# Patient Record
Sex: FEMALE | Race: WHITE | Employment: OTHER | URBAN - METROPOLITAN AREA
[De-identification: names, ages, dates, MRNs, and addresses within clinical notes are randomized per-mention and may not be internally consistent; named-entity substitution may affect disease eponyms.]

---

## 2017-05-08 ENCOUNTER — HOSPITAL ENCOUNTER (INPATIENT)
Age: 72
LOS: 1 days | Discharge: HOME | DRG: 311 | End: 2017-05-09
Attending: INTERNAL MEDICINE | Admitting: INTERNAL MEDICINE
Payer: COMMERCIAL

## 2017-05-08 PROBLEM — K21.9 ACID REFLUX: Status: ACTIVE | Noted: 2017-05-08

## 2017-05-08 PROBLEM — I20.9 ANGINA, CLASS IV (HCC): Status: ACTIVE | Noted: 2017-05-08

## 2017-05-08 PROBLEM — E78.2 MIXED DYSLIPIDEMIA: Status: ACTIVE | Noted: 2017-05-08

## 2017-05-08 PROBLEM — Z86.79: Status: ACTIVE | Noted: 2017-05-08

## 2017-05-08 LAB
PRO-BNP: 7724 PG/ML
TROPONIN: 0.05 NG/ML (ref 0–0.01)
TROPONIN: 0.06 NG/ML (ref 0–0.01)

## 2017-05-08 PROCEDURE — 6370000000 HC RX 637 (ALT 250 FOR IP): Performed by: PHYSICIAN ASSISTANT

## 2017-05-08 PROCEDURE — 83880 ASSAY OF NATRIURETIC PEPTIDE: CPT

## 2017-05-08 PROCEDURE — 2580000003 HC RX 258: Performed by: PHYSICIAN ASSISTANT

## 2017-05-08 PROCEDURE — 2060000000 HC ICU INTERMEDIATE R&B

## 2017-05-08 PROCEDURE — 36415 COLL VENOUS BLD VENIPUNCTURE: CPT

## 2017-05-08 PROCEDURE — 6360000002 HC RX W HCPCS: Performed by: INTERNAL MEDICINE

## 2017-05-08 PROCEDURE — 93005 ELECTROCARDIOGRAM TRACING: CPT

## 2017-05-08 PROCEDURE — 84484 ASSAY OF TROPONIN QUANT: CPT

## 2017-05-08 RX ORDER — TRAMADOL HYDROCHLORIDE 50 MG/1
50 TABLET ORAL EVERY 6 HOURS PRN
Status: DISCONTINUED | OUTPATIENT
Start: 2017-05-08 | End: 2017-05-09 | Stop reason: HOSPADM

## 2017-05-08 RX ORDER — SIMVASTATIN 20 MG
20 TABLET ORAL NIGHTLY
Status: DISCONTINUED | OUTPATIENT
Start: 2017-05-08 | End: 2017-05-09 | Stop reason: HOSPADM

## 2017-05-08 RX ORDER — SEVELAMER CARBONATE 800 MG/1
800 TABLET, FILM COATED ORAL
Status: DISCONTINUED | OUTPATIENT
Start: 2017-05-08 | End: 2017-05-09 | Stop reason: HOSPADM

## 2017-05-08 RX ORDER — TRAMADOL HYDROCHLORIDE 50 MG/1
100 TABLET ORAL EVERY 6 HOURS PRN
Status: DISCONTINUED | OUTPATIENT
Start: 2017-05-08 | End: 2017-05-09 | Stop reason: HOSPADM

## 2017-05-08 RX ORDER — CHOLECALCIFEROL (VITAMIN D3) 10 MCG
1 TABLET ORAL DAILY
Status: DISCONTINUED | OUTPATIENT
Start: 2017-05-08 | End: 2017-05-09 | Stop reason: HOSPADM

## 2017-05-08 RX ORDER — LIDOCAINE AND PRILOCAINE 25; 25 MG/G; MG/G
CREAM TOPICAL
Status: ON HOLD | COMMUNITY
End: 2020-01-01

## 2017-05-08 RX ORDER — ONDANSETRON 2 MG/ML
4 INJECTION INTRAMUSCULAR; INTRAVENOUS EVERY 6 HOURS PRN
Status: DISCONTINUED | OUTPATIENT
Start: 2017-05-08 | End: 2017-05-09 | Stop reason: HOSPADM

## 2017-05-08 RX ORDER — ONDANSETRON 2 MG/ML
4 INJECTION INTRAMUSCULAR; INTRAVENOUS EVERY 6 HOURS PRN
Status: DISCONTINUED | OUTPATIENT
Start: 2017-05-08 | End: 2017-05-08 | Stop reason: SDUPTHER

## 2017-05-08 RX ORDER — RANOLAZINE 500 MG/1
500 TABLET, EXTENDED RELEASE ORAL 2 TIMES DAILY
Status: DISCONTINUED | OUTPATIENT
Start: 2017-05-08 | End: 2017-05-09

## 2017-05-08 RX ORDER — HYDRALAZINE HYDROCHLORIDE 100 MG/1
100 TABLET, FILM COATED ORAL 2 TIMES DAILY
Status: ON HOLD | COMMUNITY
End: 2020-01-01

## 2017-05-08 RX ORDER — SODIUM CHLORIDE 0.9 % (FLUSH) 0.9 %
10 SYRINGE (ML) INJECTION EVERY 12 HOURS SCHEDULED
Status: DISCONTINUED | OUTPATIENT
Start: 2017-05-08 | End: 2017-05-09 | Stop reason: HOSPADM

## 2017-05-08 RX ORDER — NITROGLYCERIN 0.4 MG/1
0.4 TABLET SUBLINGUAL EVERY 5 MIN PRN
Status: DISCONTINUED | OUTPATIENT
Start: 2017-05-08 | End: 2017-05-09 | Stop reason: HOSPADM

## 2017-05-08 RX ORDER — METOPROLOL SUCCINATE 50 MG/1
50 TABLET, EXTENDED RELEASE ORAL 2 TIMES DAILY
Status: ON HOLD | COMMUNITY
End: 2020-01-01

## 2017-05-08 RX ORDER — MORPHINE SULFATE 4 MG/ML
4 INJECTION, SOLUTION INTRAMUSCULAR; INTRAVENOUS
Status: DISCONTINUED | OUTPATIENT
Start: 2017-05-08 | End: 2017-05-09 | Stop reason: HOSPADM

## 2017-05-08 RX ORDER — METOPROLOL TARTRATE 50 MG/1
50 TABLET, FILM COATED ORAL 2 TIMES DAILY
Status: DISCONTINUED | OUTPATIENT
Start: 2017-05-08 | End: 2017-05-09

## 2017-05-08 RX ORDER — ASPIRIN 81 MG/1
81 TABLET, CHEWABLE ORAL DAILY
Status: DISCONTINUED | OUTPATIENT
Start: 2017-05-09 | End: 2017-05-09 | Stop reason: HOSPADM

## 2017-05-08 RX ORDER — CINACALCET 30 MG/1
30 TABLET, FILM COATED ORAL DAILY
Status: ON HOLD | COMMUNITY
End: 2020-01-01

## 2017-05-08 RX ORDER — SEVELAMER CARBONATE 800 MG/1
1 TABLET, FILM COATED ORAL
Status: ON HOLD | COMMUNITY
End: 2020-01-01

## 2017-05-08 RX ORDER — ACETAMINOPHEN 325 MG/1
650 TABLET ORAL EVERY 4 HOURS PRN
Status: DISCONTINUED | OUTPATIENT
Start: 2017-05-08 | End: 2017-05-09 | Stop reason: HOSPADM

## 2017-05-08 RX ORDER — SODIUM CHLORIDE 0.9 % (FLUSH) 0.9 %
10 SYRINGE (ML) INJECTION PRN
Status: DISCONTINUED | OUTPATIENT
Start: 2017-05-08 | End: 2017-05-09 | Stop reason: HOSPADM

## 2017-05-08 RX ORDER — AMLODIPINE BESYLATE 5 MG/1
5 TABLET ORAL DAILY
Status: ON HOLD | COMMUNITY
End: 2020-01-01

## 2017-05-08 RX ORDER — MORPHINE SULFATE 2 MG/ML
2 INJECTION, SOLUTION INTRAMUSCULAR; INTRAVENOUS
Status: DISCONTINUED | OUTPATIENT
Start: 2017-05-08 | End: 2017-05-09 | Stop reason: HOSPADM

## 2017-05-08 RX ADMIN — ONDANSETRON HYDROCHLORIDE 4 MG: 2 INJECTION, SOLUTION INTRAMUSCULAR; INTRAVENOUS at 23:55

## 2017-05-08 RX ADMIN — TRAMADOL HYDROCHLORIDE 50 MG: 50 TABLET, FILM COATED ORAL at 23:56

## 2017-05-08 RX ADMIN — RANOLAZINE 500 MG: 500 TABLET, FILM COATED, EXTENDED RELEASE ORAL at 21:19

## 2017-05-08 RX ADMIN — SIMVASTATIN 20 MG: 20 TABLET, FILM COATED ORAL at 21:19

## 2017-05-08 RX ADMIN — SODIUM CHLORIDE, PRESERVATIVE FREE 10 ML: 5 INJECTION INTRAVENOUS at 21:20

## 2017-05-08 ASSESSMENT — ENCOUNTER SYMPTOMS
WHEEZING: 0
ABDOMINAL PAIN: 0
VOMITING: 0
APNEA: 0
NAUSEA: 0
COLOR CHANGE: 0
SHORTNESS OF BREATH: 0
COUGH: 0
CHEST TIGHTNESS: 1

## 2017-05-08 ASSESSMENT — PAIN SCALES - GENERAL: PAINLEVEL_OUTOF10: 5

## 2017-05-09 VITALS
OXYGEN SATURATION: 94 % | RESPIRATION RATE: 16 BRPM | HEART RATE: 67 BPM | SYSTOLIC BLOOD PRESSURE: 142 MMHG | TEMPERATURE: 98.2 F | BODY MASS INDEX: 25.16 KG/M2 | WEIGHT: 151.01 LBS | HEIGHT: 65 IN | DIASTOLIC BLOOD PRESSURE: 66 MMHG

## 2017-05-09 LAB
ANION GAP SERPL CALCULATED.3IONS-SCNC: 11 MEQ/L (ref 7–13)
BUN BLDV-MCNC: 39 MG/DL (ref 8–23)
CALCIUM SERPL-MCNC: 8.9 MG/DL (ref 8.6–10.2)
CHLORIDE BLD-SCNC: 95 MEQ/L (ref 98–107)
CHOLESTEROL, TOTAL: 192 MG/DL (ref 0–199)
CO2: 33 MEQ/L (ref 22–29)
CREAT SERPL-MCNC: 6.45 MG/DL (ref 0.5–0.9)
GFR AFRICAN AMERICAN: 7.7
GFR NON-AFRICAN AMERICAN: 6.4
GLUCOSE BLD-MCNC: 74 MG/DL (ref 74–109)
HCT VFR BLD CALC: 30.2 % (ref 37–47)
HDLC SERPL-MCNC: 67 MG/DL (ref 40–59)
HEMOGLOBIN: 10.3 G/DL (ref 12–16)
INR BLD: 1
LDL CHOLESTEROL CALCULATED: 100 MG/DL (ref 0–129)
LV EF: 60 %
LVEF MODALITY: NORMAL
MAGNESIUM: 2.4 MG/DL (ref 1.7–2.3)
MCH RBC QN AUTO: 33.5 PG (ref 27–31.3)
MCHC RBC AUTO-ENTMCNC: 34.2 % (ref 33–37)
MCV RBC AUTO: 97.9 FL (ref 82–100)
PDW BLD-RTO: 15.1 % (ref 11.5–14.5)
PHOSPHORUS: 4.6 MG/DL (ref 2.5–4.5)
PLATELET # BLD: 155 K/UL (ref 130–400)
POTASSIUM SERPL-SCNC: 5.4 MEQ/L (ref 3.5–5.1)
PROTHROMBIN TIME: 10.7 SEC (ref 8.1–13.7)
RBC # BLD: 3.08 M/UL (ref 4.2–5.4)
SODIUM BLD-SCNC: 139 MEQ/L (ref 132–144)
TRIGL SERPL-MCNC: 124 MG/DL (ref 0–200)
WBC # BLD: 5.9 K/UL (ref 4.8–10.8)

## 2017-05-09 PROCEDURE — 2580000003 HC RX 258: Performed by: PHYSICIAN ASSISTANT

## 2017-05-09 PROCEDURE — 99217 PR OBSERVATION CARE DISCHARGE MANAGEMENT: CPT | Performed by: NURSE PRACTITIONER

## 2017-05-09 PROCEDURE — 6370000000 HC RX 637 (ALT 250 FOR IP): Performed by: NURSE PRACTITIONER

## 2017-05-09 PROCEDURE — 6370000000 HC RX 637 (ALT 250 FOR IP): Performed by: PHYSICIAN ASSISTANT

## 2017-05-09 PROCEDURE — 93005 ELECTROCARDIOGRAM TRACING: CPT

## 2017-05-09 PROCEDURE — 80061 LIPID PANEL: CPT

## 2017-05-09 PROCEDURE — 36415 COLL VENOUS BLD VENIPUNCTURE: CPT

## 2017-05-09 PROCEDURE — 84100 ASSAY OF PHOSPHORUS: CPT

## 2017-05-09 PROCEDURE — 85610 PROTHROMBIN TIME: CPT

## 2017-05-09 PROCEDURE — 99219 PR INITIAL OBSERVATION CARE/DAY 50 MINUTES: CPT | Performed by: PHYSICIAN ASSISTANT

## 2017-05-09 PROCEDURE — 85027 COMPLETE CBC AUTOMATED: CPT

## 2017-05-09 PROCEDURE — 6370000000 HC RX 637 (ALT 250 FOR IP): Performed by: INTERNAL MEDICINE

## 2017-05-09 PROCEDURE — 80048 BASIC METABOLIC PNL TOTAL CA: CPT

## 2017-05-09 PROCEDURE — 93306 TTE W/DOPPLER COMPLETE: CPT

## 2017-05-09 PROCEDURE — 6360000002 HC RX W HCPCS: Performed by: PHYSICIAN ASSISTANT

## 2017-05-09 PROCEDURE — 83735 ASSAY OF MAGNESIUM: CPT

## 2017-05-09 RX ORDER — PANTOPRAZOLE SODIUM 40 MG/1
40 TABLET, DELAYED RELEASE ORAL
Status: DISCONTINUED | OUTPATIENT
Start: 2017-05-10 | End: 2017-05-09 | Stop reason: HOSPADM

## 2017-05-09 RX ORDER — ISOSORBIDE MONONITRATE 30 MG/1
30 TABLET, EXTENDED RELEASE ORAL DAILY
Qty: 30 TABLET | Refills: 5 | Status: ON HOLD | OUTPATIENT
Start: 2017-05-09 | End: 2020-01-01

## 2017-05-09 RX ORDER — PANTOPRAZOLE SODIUM 40 MG/1
40 TABLET, DELAYED RELEASE ORAL
Qty: 30 TABLET | Refills: 5 | Status: ON HOLD | OUTPATIENT
Start: 2017-05-10 | End: 2020-01-01

## 2017-05-09 RX ORDER — NITROGLYCERIN 0.4 MG/1
TABLET SUBLINGUAL
Qty: 25 TABLET | Refills: 3 | Status: ON HOLD | OUTPATIENT
Start: 2017-05-09 | End: 2020-01-01

## 2017-05-09 RX ORDER — ASPIRIN 81 MG/1
81 TABLET, CHEWABLE ORAL DAILY
Qty: 30 TABLET | Refills: 5 | Status: ON HOLD | OUTPATIENT
Start: 2017-05-09 | End: 2020-01-01

## 2017-05-09 RX ORDER — ISOSORBIDE MONONITRATE 30 MG/1
30 TABLET, EXTENDED RELEASE ORAL DAILY
Status: DISCONTINUED | OUTPATIENT
Start: 2017-05-09 | End: 2017-05-09 | Stop reason: HOSPADM

## 2017-05-09 RX ADMIN — METOPROLOL TARTRATE 50 MG: 50 TABLET ORAL at 08:21

## 2017-05-09 RX ADMIN — SEVELAMER CARBONATE 800 MG: 800 TABLET, FILM COATED ORAL at 12:20

## 2017-05-09 RX ADMIN — SODIUM CHLORIDE, PRESERVATIVE FREE 10 ML: 5 INJECTION INTRAVENOUS at 08:22

## 2017-05-09 RX ADMIN — RANOLAZINE 500 MG: 500 TABLET, FILM COATED, EXTENDED RELEASE ORAL at 08:21

## 2017-05-09 RX ADMIN — ASPIRIN 81 MG 81 MG: 81 TABLET ORAL at 08:21

## 2017-05-09 RX ADMIN — ENOXAPARIN SODIUM 40 MG: 40 INJECTION SUBCUTANEOUS at 08:22

## 2017-05-09 RX ADMIN — SEVELAMER CARBONATE 800 MG: 800 TABLET, FILM COATED ORAL at 08:21

## 2017-05-09 RX ADMIN — ISOSORBIDE MONONITRATE 30 MG: 30 TABLET, EXTENDED RELEASE ORAL at 12:19

## 2017-05-09 RX ADMIN — Medication 30 ML: at 12:19

## 2017-05-09 RX ADMIN — Medication 1 MG: at 08:21

## 2017-05-09 ASSESSMENT — ENCOUNTER SYMPTOMS
ALLERGIC/IMMUNOLOGIC NEGATIVE: 1
COUGH: 0
CHEST TIGHTNESS: 0
APNEA: 0
EYES NEGATIVE: 1
GASTROINTESTINAL NEGATIVE: 1
WHEEZING: 0
CHOKING: 0
SHORTNESS OF BREATH: 0
STRIDOR: 0

## 2017-05-09 ASSESSMENT — PAIN SCALES - GENERAL
PAINLEVEL_OUTOF10: 3
PAINLEVEL_OUTOF10: 0

## 2017-05-10 LAB
EKG ATRIAL RATE: 73 BPM
EKG ATRIAL RATE: 75 BPM
EKG P AXIS: 51 DEGREES
EKG P AXIS: 56 DEGREES
EKG P-R INTERVAL: 176 MS
EKG P-R INTERVAL: 182 MS
EKG Q-T INTERVAL: 440 MS
EKG Q-T INTERVAL: 444 MS
EKG QRS DURATION: 84 MS
EKG QRS DURATION: 92 MS
EKG QTC CALCULATION (BAZETT): 489 MS
EKG QTC CALCULATION (BAZETT): 491 MS
EKG R AXIS: -47 DEGREES
EKG R AXIS: -52 DEGREES
EKG T AXIS: 60 DEGREES
EKG T AXIS: 68 DEGREES
EKG VENTRICULAR RATE: 73 BPM
EKG VENTRICULAR RATE: 75 BPM

## 2017-05-15 PROBLEM — E79.0 ELEVATED BLOOD URIC ACID LEVEL: Status: ACTIVE | Noted: 2017-03-21

## 2017-09-26 ENCOUNTER — HOSPITAL ENCOUNTER (OUTPATIENT)
Dept: INTERVENTIONAL RADIOLOGY/VASCULAR | Age: 72
Discharge: HOME | End: 2017-09-26
Payer: COMMERCIAL

## 2017-09-26 VITALS
DIASTOLIC BLOOD PRESSURE: 75 MMHG | BODY MASS INDEX: 25.16 KG/M2 | OXYGEN SATURATION: 99 % | SYSTOLIC BLOOD PRESSURE: 152 MMHG | RESPIRATION RATE: 14 BRPM | HEIGHT: 65 IN | TEMPERATURE: 97.8 F | WEIGHT: 151 LBS | HEART RATE: 68 BPM

## 2017-09-26 PROCEDURE — 6360000002 HC RX W HCPCS: Performed by: SURGERY

## 2017-09-26 PROCEDURE — 2500000003 HC RX 250 WO HCPCS: Performed by: SURGERY

## 2017-09-26 PROCEDURE — 2580000003 HC RX 258: Performed by: SURGERY

## 2017-09-26 PROCEDURE — C1894 INTRO/SHEATH, NON-LASER: HCPCS

## 2017-09-26 PROCEDURE — 36901 INTRO CATH DIALYSIS CIRCUIT: CPT | Performed by: SURGERY

## 2017-09-26 RX ORDER — 0.9 % SODIUM CHLORIDE 0.9 %
250 INTRAVENOUS SOLUTION INTRAVENOUS ONCE
Status: COMPLETED | OUTPATIENT
Start: 2017-09-26 | End: 2017-09-26

## 2017-09-26 RX ORDER — MIDAZOLAM HYDROCHLORIDE 1 MG/ML
0.5 INJECTION INTRAMUSCULAR; INTRAVENOUS
Status: DISCONTINUED | OUTPATIENT
Start: 2017-09-26 | End: 2017-09-29 | Stop reason: HOSPADM

## 2017-09-26 RX ORDER — HEPARIN SODIUM 1000 [USP'U]/ML
10000 INJECTION, SOLUTION INTRAVENOUS; SUBCUTANEOUS EVERY 8 HOURS SCHEDULED
Status: COMPLETED | OUTPATIENT
Start: 2017-09-26 | End: 2017-09-26

## 2017-09-26 RX ORDER — FENTANYL CITRATE 50 UG/ML
25 INJECTION, SOLUTION INTRAMUSCULAR; INTRAVENOUS
Status: DISCONTINUED | OUTPATIENT
Start: 2017-09-26 | End: 2017-09-29 | Stop reason: HOSPADM

## 2017-09-26 RX ORDER — LIDOCAINE HYDROCHLORIDE 20 MG/ML
5 INJECTION, SOLUTION INFILTRATION; PERINEURAL
Status: DISCONTINUED | OUTPATIENT
Start: 2017-09-26 | End: 2017-09-29 | Stop reason: HOSPADM

## 2017-09-26 RX ORDER — 0.9 % SODIUM CHLORIDE 0.9 %
1000 INTRAVENOUS SOLUTION INTRAVENOUS
Status: DISCONTINUED | OUTPATIENT
Start: 2017-09-26 | End: 2017-09-29 | Stop reason: HOSPADM

## 2017-09-26 RX ADMIN — FENTANYL CITRATE 25 MCG: 50 INJECTION, SOLUTION INTRAMUSCULAR; INTRAVENOUS at 13:27

## 2017-09-26 RX ADMIN — SODIUM CHLORIDE 1000 ML: 9 INJECTION, SOLUTION INTRAVENOUS at 13:05

## 2017-09-26 RX ADMIN — LIDOCAINE HYDROCHLORIDE 2 ML: 20 INJECTION, SOLUTION INFILTRATION; PERINEURAL at 13:25

## 2017-09-26 RX ADMIN — SODIUM CHLORIDE 250 ML: 9 INJECTION, SOLUTION INTRAVENOUS at 13:05

## 2017-09-26 RX ADMIN — HEPARIN SODIUM 10000 UNITS: 1000 INJECTION, SOLUTION INTRAVENOUS; SUBCUTANEOUS at 13:25

## 2017-09-26 RX ADMIN — MIDAZOLAM HYDROCHLORIDE 0.5 MG: 1 INJECTION, SOLUTION INTRAMUSCULAR; INTRAVENOUS at 13:25

## 2017-09-26 ASSESSMENT — PAIN SCALES - GENERAL
PAINLEVEL_OUTOF10: 0

## 2017-09-26 ASSESSMENT — PAIN - FUNCTIONAL ASSESSMENT: PAIN_FUNCTIONAL_ASSESSMENT: 0-10

## 2018-03-01 ENCOUNTER — HOSPITAL ENCOUNTER (OUTPATIENT)
Dept: NUCLEAR MEDICINE | Age: 73
Discharge: HOME | End: 2018-03-03
Payer: COMMERCIAL

## 2018-03-01 DIAGNOSIS — K80.20 GALLSTONES: ICD-10-CM

## 2018-03-01 DIAGNOSIS — R10.9 ABDOMINAL PAIN, UNSPECIFIED ABDOMINAL LOCATION: ICD-10-CM

## 2018-03-01 PROCEDURE — 78227 HEPATOBIL SYST IMAGE W/DRUG: CPT

## 2018-03-01 PROCEDURE — A9537 TC99M MEBROFENIN: HCPCS | Performed by: INTERNAL MEDICINE

## 2018-03-01 PROCEDURE — 3430000000 HC RX DIAGNOSTIC RADIOPHARMACEUTICAL: Performed by: INTERNAL MEDICINE

## 2018-03-01 RX ADMIN — Medication 8 MILLICURIE: at 07:25

## 2020-01-01 ENCOUNTER — APPOINTMENT (OUTPATIENT)
Dept: CT IMAGING | Age: 75
DRG: 025 | End: 2020-01-01
Payer: COMMERCIAL

## 2020-01-01 ENCOUNTER — HOSPITAL ENCOUNTER (INPATIENT)
Age: 75
LOS: 8 days | Discharge: NURSING, REHAB OR LONG-TERM CARE | DRG: 025 | End: 2020-01-09
Attending: EMERGENCY MEDICINE | Admitting: INTERNAL MEDICINE
Payer: COMMERCIAL

## 2020-01-01 ENCOUNTER — APPOINTMENT (OUTPATIENT)
Dept: MRI IMAGING | Age: 75
DRG: 025 | End: 2020-01-01
Payer: COMMERCIAL

## 2020-01-01 ENCOUNTER — APPOINTMENT (OUTPATIENT)
Dept: GENERAL RADIOLOGY | Age: 75
DRG: 025 | End: 2020-01-01
Payer: COMMERCIAL

## 2020-01-01 PROBLEM — D49.6 BRAIN NEOPLASM (HCC): Status: ACTIVE | Noted: 2020-01-01

## 2020-01-01 LAB
ALBUMIN SERPL-MCNC: 4.5 G/DL (ref 3.5–4.6)
ALP BLD-CCNC: 100 U/L (ref 40–130)
ALT SERPL-CCNC: 8 U/L (ref 0–33)
ANION GAP SERPL CALCULATED.3IONS-SCNC: 21 MEQ/L (ref 9–15)
AST SERPL-CCNC: 21 U/L (ref 0–35)
ATYPICAL LYMPHOCYTE RELATIVE PERCENT: 3 %
BANDED NEUTROPHILS RELATIVE PERCENT: 2 % (ref 5–11)
BASOPHILS ABSOLUTE: 0 K/UL (ref 0–0.2)
BASOPHILS RELATIVE PERCENT: 1.3 %
BILIRUB SERPL-MCNC: 0.5 MG/DL (ref 0.2–0.7)
BILIRUBIN URINE: NEGATIVE
BLOOD, URINE: NEGATIVE
BUN BLDV-MCNC: 23 MG/DL (ref 8–23)
CALCIUM SERPL-MCNC: 9.4 MG/DL (ref 8.5–9.9)
CHLORIDE BLD-SCNC: 101 MEQ/L (ref 95–107)
CHP ED QC CHECK: NORMAL
CLARITY: CLEAR
CO2: 21 MEQ/L (ref 20–31)
COLOR: YELLOW
CREAT SERPL-MCNC: 1.3 MG/DL (ref 0.5–0.9)
EKG ATRIAL RATE: 94 BPM
EKG P AXIS: 73 DEGREES
EKG P-R INTERVAL: 210 MS
EKG Q-T INTERVAL: 374 MS
EKG QRS DURATION: 84 MS
EKG QTC CALCULATION (BAZETT): 467 MS
EKG R AXIS: -40 DEGREES
EKG T AXIS: 90 DEGREES
EKG VENTRICULAR RATE: 94 BPM
EOSINOPHILS ABSOLUTE: 0.1 K/UL (ref 0–0.7)
EOSINOPHILS RELATIVE PERCENT: 3 %
GFR AFRICAN AMERICAN: 48.7
GFR NON-AFRICAN AMERICAN: 40.2
GLOBULIN: 2.2 G/DL (ref 2.3–3.5)
GLUCOSE BLD-MCNC: 107 MG/DL (ref 70–99)
GLUCOSE BLD-MCNC: 78 MG/DL
GLUCOSE BLD-MCNC: 78 MG/DL (ref 60–115)
GLUCOSE URINE: NEGATIVE MG/DL
HCT VFR BLD CALC: 41.3 % (ref 37–47)
HEMOGLOBIN: 13.4 G/DL (ref 12–16)
KETONES, URINE: NEGATIVE MG/DL
LEUKOCYTE ESTERASE, URINE: NEGATIVE
LIPASE: 25 U/L (ref 12–95)
LYMPHOCYTES ABSOLUTE: 1.4 K/UL (ref 1–4.8)
LYMPHOCYTES RELATIVE PERCENT: 37 %
MCH RBC QN AUTO: 30.1 PG (ref 27–31.3)
MCHC RBC AUTO-ENTMCNC: 32.5 % (ref 33–37)
MCV RBC AUTO: 92.7 FL (ref 82–100)
MONOCYTES ABSOLUTE: 0.2 K/UL (ref 0.2–0.8)
MONOCYTES RELATIVE PERCENT: 6.7 %
NEUTROPHILS ABSOLUTE: 1.7 K/UL (ref 1.4–6.5)
NEUTROPHILS RELATIVE PERCENT: 49 %
NITRITE, URINE: NEGATIVE
NUCLEATED RED BLOOD CELLS: 1 /100 WBC
PDW BLD-RTO: 13.7 % (ref 11.5–14.5)
PERFORMED ON: NORMAL
PH UA: 7 (ref 5–9)
PLATELET # BLD: 167 K/UL (ref 130–400)
PLATELET SLIDE REVIEW: NORMAL
POTASSIUM SERPL-SCNC: 4.1 MEQ/L (ref 3.4–4.9)
PROTEIN UA: NEGATIVE MG/DL
RBC # BLD: 4.46 M/UL (ref 4.2–5.4)
SLIDE REVIEW: ABNORMAL
SODIUM BLD-SCNC: 143 MEQ/L (ref 135–144)
SPECIFIC GRAVITY UA: 1.01 (ref 1–1.03)
TOTAL PROTEIN: 6.7 G/DL (ref 6.3–8)
TROPONIN: <0.01 NG/ML (ref 0–0.01)
URINE REFLEX TO CULTURE: NORMAL
UROBILINOGEN, URINE: 0.2 E.U./DL
WBC # BLD: 3.4 K/UL (ref 4.8–10.8)

## 2020-01-01 PROCEDURE — 36415 COLL VENOUS BLD VENIPUNCTURE: CPT

## 2020-01-01 PROCEDURE — 2000000000 HC ICU R&B

## 2020-01-01 PROCEDURE — 71250 CT THORAX DX C-: CPT

## 2020-01-01 PROCEDURE — 6370000000 HC RX 637 (ALT 250 FOR IP): Performed by: EMERGENCY MEDICINE

## 2020-01-01 PROCEDURE — 6360000002 HC RX W HCPCS: Performed by: PHYSICIAN ASSISTANT

## 2020-01-01 PROCEDURE — 84484 ASSAY OF TROPONIN QUANT: CPT

## 2020-01-01 PROCEDURE — 99285 EMERGENCY DEPT VISIT HI MDM: CPT

## 2020-01-01 PROCEDURE — 81003 URINALYSIS AUTO W/O SCOPE: CPT

## 2020-01-01 PROCEDURE — 6370000000 HC RX 637 (ALT 250 FOR IP): Performed by: INTERNAL MEDICINE

## 2020-01-01 PROCEDURE — 70450 CT HEAD/BRAIN W/O DYE: CPT

## 2020-01-01 PROCEDURE — 96374 THER/PROPH/DIAG INJ IV PUSH: CPT

## 2020-01-01 PROCEDURE — 71045 X-RAY EXAM CHEST 1 VIEW: CPT

## 2020-01-01 PROCEDURE — 6360000004 HC RX CONTRAST MEDICATION: Performed by: EMERGENCY MEDICINE

## 2020-01-01 PROCEDURE — 70553 MRI BRAIN STEM W/O & W/DYE: CPT

## 2020-01-01 PROCEDURE — 2580000003 HC RX 258: Performed by: PHYSICIAN ASSISTANT

## 2020-01-01 PROCEDURE — A9577 INJ MULTIHANCE: HCPCS | Performed by: EMERGENCY MEDICINE

## 2020-01-01 PROCEDURE — 85025 COMPLETE CBC W/AUTO DIFF WBC: CPT

## 2020-01-01 PROCEDURE — 96376 TX/PRO/DX INJ SAME DRUG ADON: CPT

## 2020-01-01 PROCEDURE — 80053 COMPREHEN METABOLIC PANEL: CPT

## 2020-01-01 PROCEDURE — 93005 ELECTROCARDIOGRAM TRACING: CPT | Performed by: EMERGENCY MEDICINE

## 2020-01-01 PROCEDURE — 6360000002 HC RX W HCPCS: Performed by: EMERGENCY MEDICINE

## 2020-01-01 PROCEDURE — 83690 ASSAY OF LIPASE: CPT

## 2020-01-01 PROCEDURE — 6360000002 HC RX W HCPCS: Performed by: INTERNAL MEDICINE

## 2020-01-01 RX ORDER — SODIUM CHLORIDE 0.9 % (FLUSH) 0.9 %
10 SYRINGE (ML) INJECTION 2 TIMES DAILY
Status: DISCONTINUED | OUTPATIENT
Start: 2020-01-01 | End: 2020-01-09 | Stop reason: HOSPADM

## 2020-01-01 RX ORDER — SODIUM CHLORIDE 9 MG/ML
INJECTION, SOLUTION INTRAVENOUS CONTINUOUS
Status: DISCONTINUED | OUTPATIENT
Start: 2020-01-01 | End: 2020-01-02

## 2020-01-01 RX ORDER — MYCOPHENOLATE MOFETIL 250 MG/1
500 CAPSULE ORAL 2 TIMES DAILY
COMMUNITY
Start: 2019-08-12

## 2020-01-01 RX ORDER — SULFAMETHOXAZOLE AND TRIMETHOPRIM 400; 80 MG/1; MG/1
1 TABLET ORAL DAILY
COMMUNITY
Start: 2019-03-04

## 2020-01-01 RX ORDER — ONDANSETRON 2 MG/ML
4 INJECTION INTRAMUSCULAR; INTRAVENOUS ONCE
Status: COMPLETED | OUTPATIENT
Start: 2020-01-01 | End: 2020-01-01

## 2020-01-01 RX ORDER — SULFAMETHOXAZOLE AND TRIMETHOPRIM 400; 80 MG/1; MG/1
1 TABLET ORAL DAILY
Status: DISCONTINUED | OUTPATIENT
Start: 2020-01-01 | End: 2020-01-09 | Stop reason: HOSPADM

## 2020-01-01 RX ORDER — PREDNISONE 1 MG/1
5 TABLET ORAL DAILY
COMMUNITY
Start: 2019-06-18

## 2020-01-01 RX ORDER — CINACALCET 60 MG/1
60 TABLET, FILM COATED ORAL DAILY
COMMUNITY
Start: 2019-07-09

## 2020-01-01 RX ORDER — TACROLIMUS 1 MG/1
2 CAPSULE ORAL EVERY EVENING
Status: DISCONTINUED | OUTPATIENT
Start: 2020-01-01 | End: 2020-01-09 | Stop reason: HOSPADM

## 2020-01-01 RX ORDER — TACROLIMUS 1 MG/1
CAPSULE ORAL
COMMUNITY
Start: 2019-05-24

## 2020-01-01 RX ORDER — ACETAMINOPHEN 325 MG/1
650 TABLET ORAL ONCE
Status: COMPLETED | OUTPATIENT
Start: 2020-01-01 | End: 2020-01-01

## 2020-01-01 RX ORDER — HYDRALAZINE HYDROCHLORIDE 20 MG/ML
10 INJECTION INTRAMUSCULAR; INTRAVENOUS EVERY 6 HOURS PRN
Status: DISCONTINUED | OUTPATIENT
Start: 2020-01-01 | End: 2020-01-03

## 2020-01-01 RX ORDER — DULOXETIN HYDROCHLORIDE 30 MG/1
30 CAPSULE, DELAYED RELEASE ORAL DAILY
Status: DISCONTINUED | OUTPATIENT
Start: 2020-01-01 | End: 2020-01-09 | Stop reason: HOSPADM

## 2020-01-01 RX ORDER — DEXAMETHASONE SODIUM PHOSPHATE 10 MG/ML
10 INJECTION INTRAMUSCULAR; INTRAVENOUS ONCE
Status: COMPLETED | OUTPATIENT
Start: 2020-01-01 | End: 2020-01-01

## 2020-01-01 RX ORDER — MYCOPHENOLATE MOFETIL 250 MG/1
500 CAPSULE ORAL 2 TIMES DAILY
Status: DISCONTINUED | OUTPATIENT
Start: 2020-01-01 | End: 2020-01-09 | Stop reason: HOSPADM

## 2020-01-01 RX ORDER — CINACALCET 30 MG/1
60 TABLET, FILM COATED ORAL DAILY
Status: DISCONTINUED | OUTPATIENT
Start: 2020-01-01 | End: 2020-01-05

## 2020-01-01 RX ORDER — SODIUM CHLORIDE 0.9 % (FLUSH) 0.9 %
10 SYRINGE (ML) INJECTION PRN
Status: DISCONTINUED | OUTPATIENT
Start: 2020-01-01 | End: 2020-01-09 | Stop reason: HOSPADM

## 2020-01-01 RX ORDER — DULOXETIN HYDROCHLORIDE 30 MG/1
30 CAPSULE, DELAYED RELEASE ORAL DAILY
COMMUNITY
Start: 2019-10-18 | End: 2020-01-16

## 2020-01-01 RX ORDER — ONDANSETRON 2 MG/ML
4 INJECTION INTRAMUSCULAR; INTRAVENOUS EVERY 6 HOURS PRN
Status: DISCONTINUED | OUTPATIENT
Start: 2020-01-01 | End: 2020-01-03 | Stop reason: SDUPTHER

## 2020-01-01 RX ORDER — SODIUM CHLORIDE 0.9 % (FLUSH) 0.9 %
10 SYRINGE (ML) INJECTION EVERY 12 HOURS SCHEDULED
Status: DISCONTINUED | OUTPATIENT
Start: 2020-01-01 | End: 2020-01-09 | Stop reason: HOSPADM

## 2020-01-01 RX ORDER — DEXAMETHASONE SODIUM PHOSPHATE 4 MG/ML
4 INJECTION, SOLUTION INTRA-ARTICULAR; INTRALESIONAL; INTRAMUSCULAR; INTRAVENOUS; SOFT TISSUE EVERY 6 HOURS
Status: DISCONTINUED | OUTPATIENT
Start: 2020-01-01 | End: 2020-01-02

## 2020-01-01 RX ORDER — LORAZEPAM 2 MG/ML
2 INJECTION INTRAMUSCULAR EVERY 6 HOURS PRN
Status: DISCONTINUED | OUTPATIENT
Start: 2020-01-01 | End: 2020-01-09 | Stop reason: HOSPADM

## 2020-01-01 RX ORDER — TACROLIMUS 1 MG/1
3 CAPSULE ORAL EVERY MORNING
Status: DISCONTINUED | OUTPATIENT
Start: 2020-01-02 | End: 2020-01-09 | Stop reason: HOSPADM

## 2020-01-01 RX ADMIN — ACETAMINOPHEN 650 MG: 325 TABLET ORAL at 11:48

## 2020-01-01 RX ADMIN — GADOBENATE DIMEGLUMINE 7 ML: 529 INJECTION, SOLUTION INTRAVENOUS at 11:16

## 2020-01-01 RX ADMIN — LEVETIRACETAM 500 MG: 500 INJECTION, SOLUTION INTRAVENOUS at 15:38

## 2020-01-01 RX ADMIN — HYDRALAZINE HYDROCHLORIDE 10 MG: 20 INJECTION INTRAMUSCULAR; INTRAVENOUS at 16:13

## 2020-01-01 RX ADMIN — ONDANSETRON 4 MG: 2 INJECTION INTRAMUSCULAR; INTRAVENOUS at 12:16

## 2020-01-01 RX ADMIN — DEXAMETHASONE SODIUM PHOSPHATE 4 MG: 4 INJECTION, SOLUTION INTRAMUSCULAR; INTRAVENOUS at 19:19

## 2020-01-01 RX ADMIN — TACROLIMUS 2 MG: 1 CAPSULE ORAL at 17:44

## 2020-01-01 RX ADMIN — METOPROLOL TARTRATE 25 MG: 25 TABLET, FILM COATED ORAL at 21:24

## 2020-01-01 RX ADMIN — DEXAMETHASONE SODIUM PHOSPHATE 10 MG: 10 INJECTION INTRAMUSCULAR; INTRAVENOUS at 13:22

## 2020-01-01 RX ADMIN — SODIUM CHLORIDE: 9 INJECTION, SOLUTION INTRAVENOUS at 15:21

## 2020-01-01 RX ADMIN — ONDANSETRON 4 MG: 2 INJECTION INTRAMUSCULAR; INTRAVENOUS at 11:41

## 2020-01-01 RX ADMIN — ONDANSETRON 4 MG: 2 INJECTION INTRAMUSCULAR; INTRAVENOUS at 13:34

## 2020-01-01 RX ADMIN — MYCOPHENOLATE MOFETIL 500 MG: 250 CAPSULE ORAL at 21:24

## 2020-01-01 SDOH — HEALTH STABILITY: MENTAL HEALTH: HOW OFTEN DO YOU HAVE A DRINK CONTAINING ALCOHOL?: NEVER

## 2020-01-01 ASSESSMENT — ENCOUNTER SYMPTOMS
EYE ITCHING: 0
COLOR CHANGE: 0
SORE THROAT: 0
CHOKING: 0
SINUS PRESSURE: 0
CHEST TIGHTNESS: 0
WHEEZING: 0
DIARRHEA: 0
STRIDOR: 0
EYE REDNESS: 0
ANAL BLEEDING: 0
PHOTOPHOBIA: 0
COUGH: 0
RHINORRHEA: 0
CONSTIPATION: 0
ABDOMINAL DISTENTION: 0
BACK PAIN: 0
VOICE CHANGE: 0
ABDOMINAL PAIN: 0
EYE DISCHARGE: 0
SHORTNESS OF BREATH: 0
TROUBLE SWALLOWING: 0
EYE PAIN: 0
FACIAL SWELLING: 0
NAUSEA: 0
BLOOD IN STOOL: 0
VOMITING: 0

## 2020-01-01 ASSESSMENT — PAIN SCALES - GENERAL
PAINLEVEL_OUTOF10: 0
PAINLEVEL_OUTOF10: 10
PAINLEVEL_OUTOF10: 0
PAINLEVEL_OUTOF10: 0

## 2020-01-01 NOTE — ED PROVIDER NOTES
current or ex partner: None     Emotionally abused: None     Physically abused: None     Forced sexual activity: None   Other Topics Concern    None   Social History Narrative    None       SCREENINGS   NIH Stroke Scale  Interval: Baseline  Level of Consciousness (1a. ): Alert  LOC Questions (1b. ): Answers both correctly  LOC Commands (1c. ): Performs both tasks correctly  Best Gaze (2. ): Normal  Visual (3. ): No visual loss  Facial Palsy (4. ): Normal symmetrical movement  Motor Arm, Left (5a. ): No drift  Motor Arm, Right (5b. ): No drift  Motor Leg, Left (6a. ): No drift  Motor Leg, Right (6b. ): No drift  Limb Ataxia (7. ): Absent  Sensory (8. ): Normal  Best Language (9. ): Mild to moderate aphasia  Dysarthria (10. ): Normal  Extinction and Inattention (11): No abnormality  Total: 1Glasgow Coma Scale  Eye Opening: Spontaneous  Best Verbal Response: Oriented  Best Motor Response: Obeys commands  [place] Coma Scale Score: 15        PHYSICAL EXAM    (up to 7 for level 4, 8 or more for level 5)     ED Triage Vitals [01/01/20 0836]   BP Temp Temp src Pulse Resp SpO2 Height Weight   (!) 177/83 -- -- 85 19 96 % -- --       Physical Exam  Vitals signs and nursing note reviewed. Constitutional:       General: She is not in acute distress. Appearance: She is well-developed. She is not diaphoretic. HENT:      Head: Normocephalic and atraumatic. Right Ear: External ear normal.      Left Ear: External ear normal.      Nose: Nose normal.      Mouth/Throat:      Pharynx: No oropharyngeal exudate. Eyes:      General: No scleral icterus. Right eye: No discharge. Left eye: No discharge. Conjunctiva/sclera: Conjunctivae normal.      Pupils: Pupils are equal, round, and reactive to light. Neck:      Musculoskeletal: Normal range of motion and neck supple. Thyroid: No thyromegaly. Vascular: No JVD. Trachea: No tracheal deviation.    Cardiovascular:      Rate and Rhythm: Normal rate and regular rhythm. Heart sounds: Normal heart sounds. No murmur. No friction rub. No gallop. Pulmonary:      Effort: Pulmonary effort is normal. No respiratory distress. Breath sounds: Normal breath sounds. No stridor. No wheezing or rales. Chest:      Chest wall: No tenderness. Abdominal:      General: Bowel sounds are normal. There is no distension. Palpations: Abdomen is soft. There is no mass. Tenderness: There is no tenderness. There is no guarding or rebound. Musculoskeletal: Normal range of motion. General: No tenderness. Lymphadenopathy:      Cervical: No cervical adenopathy. Skin:     General: Skin is warm and dry. Coloration: Skin is not pale. Findings: No erythema or rash. Neurological:      Mental Status: She is alert and oriented to person, place, and time. Cranial Nerves: No cranial nerve deficit. Motor: No abnormal muscle tone. Coordination: Coordination normal.      Deep Tendon Reflexes: Reflexes are normal and symmetric. Reflexes normal.      Comments: Patient's NIH stroke scale is 2. This was only discerned after speaking with the family because otherwise the patient is grossly neurologically intact. There is no complaint of head pain or pain anywhere in the body. Patient's family states she is having some difficulty formulating sentences and is searching for words in her native [facility] language. Psychiatric:         Behavior: Behavior normal.         Thought Content: Thought content normal.         Judgment: Judgment normal.           DIAGNOSTIC RESULTS     EKG: All EKG's are interpreted by the Emergency Department Physician who either signs or Co-signs this chart in the absence of a cardiologist.    Modifying factors twelve-lead EKG was performed which shows normal sinus rhythm with a rate of 94 bpm there is a first-degree AV block.   There is some left atrial enlargement left axis deviation there is no ST segment elevation or depression in any limb lead or precordial lead. There is left ventricular hypertrophy. Summary: Abnormal EKG without acute findings of a STEMI MI.      RADIOLOGY:   Non-plain film images such as CT, Ultrasound and MRI are read by the radiologist. Plain radiographic images are visualized and preliminarily interpreted by the emergency physician with the below findings:    CT scan was performed and I received a call from the radiologist stated that there is cerebral edema and some herniation. He recommended an MRI which I ordered immediately. Interpretation per the Radiologist below, if available at the time of this note:    MRI Brain W WO Contrast   Final Result   1. Enhancing mass/malignancy in the left middle cranial fossa/left anterior temporal lobe, findings suspicious for meningioma although glioblastoma is not excluded. There is extensive edema extending throughout the left frontal lobe, left parietal lobe    and left basal ganglia/temporal lobe, without evidence of restricted diffusion, greater than expected. Findings could reflect extensive edema from the mass/malignancy. Other etiologies are not excluded. . Neurosurgical/neurological evaluation recommended. 2. Left to right subfalcine herniation of 0.56 cm. Neurosurgical consultation and evaluation is recommended. 3. Dominant left vertebral artery               CT Head WO Contrast   Final Result   Findings communicated directly with Dr. Leslie Whittington  on 1/1/2020 9:25 AM .    1. Abnormal exam. Large area of hypodensity and edema with mass effect on left frontal, temporal and parietal lobes and left basal ganglia with left to right subfalcine herniation of 0.56 cm. Findings are concerning for potential underlying    mass/malignancy with extensive edema and mass effect versus cerebrovascular infarction/accident with edema and mass effect. Further evaluation MRI of the brain with and without IV contrast is recommended.    2. Vascular calcifications within the bilateral internal carotid artery cavernous segments and the vertebral arteries. XR CHEST PORTABLE   Final Result   Impression:     1. Possible nodular abnormality left lower lung, not well characterized, measuring 1.4 cm, correlation with CT scan chest recommended. 2. Enlarged cardiomediastinal silhouette, the possibility of underlying pericardial effusion or other cardiac abnormalities are not excluded on the basis of this exam, clinical correlation recommended. .   3. Nonspecific bibasilar airspace disease, findings can be seen in infiltrate/pneumonia and/or atelectasis. Vj Apple 4. Vascular calcifications thoracic aorta               ED BEDSIDE ULTRASOUND:   Performed by ED Physician - none    LABS:  Labs Reviewed   COMPREHENSIVE METABOLIC PANEL - Abnormal; Notable for the following components:       Result Value    Anion Gap 21 (*)     Glucose 107 (*)     CREATININE 1.30 (*)     GFR Non- 40.2 (*)     GFR  48.7 (*)     Globulin 2.2 (*)     All other components within normal limits   CBC WITH AUTO DIFFERENTIAL - Abnormal; Notable for the following components:    WBC 3.4 (*)     MCHC 32.5 (*)     Bands Relative 2 (*)     All other components within normal limits   POCT GLUCOSE - Normal   LIPASE   TROPONIN   URINE RT REFLEX TO CULTURE   POCT GLUCOSE       All other labs were within normal range or not returned as of this dictation. EMERGENCY DEPARTMENT COURSE and DIFFERENTIAL DIAGNOSIS/MDM:   Vitals:    Vitals:    01/01/20 1145 01/01/20 1148 01/01/20 1200 01/01/20 1215   BP:  (!) 174/73 (!) 169/78    Pulse: 70 69 67 80   Resp: 16 17 14 17   Temp:       TempSrc:       SpO2: 92% 92% 94% 92%       CT scan performed which shows midline shift possible mass-effect. An MRI shows a large tumor. I called Dr. Fátima Brooks, neurosurgeon on-call, who will see the patient in the hospital.  Patient was admitted.     [facility]      CRITICAL CARE TIME

## 2020-01-01 NOTE — ED NOTES
Pt prefers to sit upright on the cart. Complaining of 10/10 headache since the MRI. Dr Luong Money at the bedside to speak with family.        Shala Landa RN  01/01/20 9231

## 2020-01-01 NOTE — ED NOTES
Pt vomiting again at this time. Clear green emesis noted. Small amount. Dr Lin Tubbs notified.       Akosua Del Castillo, RN  01/01/20 0885

## 2020-01-01 NOTE — ED NOTES
Dr De La Cruz Job in to speak with patient and patient's family.        Libia Faith, RN  01/01/20 5595

## 2020-01-01 NOTE — ED NOTES
Pt drowsy at this time. Will awaken when I tap her on the arm/shoulder but wants to go right back to sleep. Right sided facial droop noted at this time. Dr Efrain Mahajan updated on patient's new onset facial droop. Daughter at bedside also stated she just noticed it as well. Pt does follow commands and will do what is asked of her but appears drowsy and wants to go right back to sleep.        Radha Allen, RN  01/01/20 0800

## 2020-01-01 NOTE — H&P
bilateral internal carotid artery cavernous segments and the vertebral arteries. XR CHEST PORTABLE   Final Result   Impression:     1. Possible nodular abnormality left lower lung, not well characterized, measuring 1.4 cm, correlation with CT scan chest recommended. 2. Enlarged cardiomediastinal silhouette, the possibility of underlying pericardial effusion or other cardiac abnormalities are not excluded on the basis of this exam, clinical correlation recommended. .   3. Nonspecific bibasilar airspace disease, findings can be seen in infiltrate/pneumonia and/or atelectasis. Elsa Long 4. Vascular calcifications thoracic aorta             ASSESSMENT:    Active Hospital Problems    Diagnosis Date Noted    Brain neoplasm [facility]) [D49.6] 01/01/2020       PLAN:        DVT Prophylaxis: lovenox  Diet: No diet orders on file  Code Status: Prior    PT/OT Eval Status: eval and tx    1. Brain neoplasm- with mass effectwill admit to ICU and consult neurology and neurosurgery. Will medicate with iv decadron and keppra. Will obtain mri/mra of the brain  2.h/o renal tranplant  Spoke with [employer]  They said as long as its not lymphoma on CNS that causing this pt should continue with her immunosuppresive treament    3. Altered mental status  I saw and evaluated the pt. I personally obtained the key and critical portions of the history and physical exam. I reviewed the mild level documentation and agree with assessment and plan that we come up together  Electronically signed by Elizabeth Anthony MD on 1/1/20 at 1:58 PM         RAFAL Cavazos    Thank you Reinier Mcghee DO for the opportunity to be involved in this patient's care. If you have any questions or concerns please feel free to contact me.

## 2020-01-01 NOTE — ED NOTES
Pt had another episode of vomiting and urinated on herself. Dr Jimena Tidwell advised.       Delores Romberg, RN  01/01/20 4513

## 2020-01-01 NOTE — ED NOTES
In speaking with family pt is having expressive aphasia according to them. Pt is [place] speaking only and per family patient keeps attempting to tell them what happened to her but is unable to find the words.        Tania Philip RN  01/01/20 5855

## 2020-01-01 NOTE — ED TRIAGE NOTES
Pt brought by Zac Serna from home for c/o altered mental status. Per family members at the bedside they all went to bed around 2300 hours last night and this morning they woke up and patient was sitting upright on the couch not responding to questions and staring off. Pt arrives alert and oriented x3. Pt is [place] speaking only family members at the bedside to assist me with translation. Dried blood noted to the left side of patients mouth and asked patient to open her mouth and it appears patient bit her tongue. Family members deny patient ever having history of seizures. Pt able to move all extremities. Per Dr Moris Wilson give patient water to pass swallow eval so patient can take her anti-rejection medications.

## 2020-01-02 ENCOUNTER — APPOINTMENT (OUTPATIENT)
Dept: MRI IMAGING | Age: 75
DRG: 025 | End: 2020-01-02
Payer: COMMERCIAL

## 2020-01-02 PROBLEM — C71.0: Status: ACTIVE | Noted: 2020-01-02

## 2020-01-02 LAB
ANION GAP SERPL CALCULATED.3IONS-SCNC: 12 MEQ/L (ref 9–15)
BASOPHILS ABSOLUTE: 0 K/UL (ref 0–0.2)
BASOPHILS RELATIVE PERCENT: 0.3 %
BUN BLDV-MCNC: 26 MG/DL (ref 8–23)
CALCIUM SERPL-MCNC: 9.4 MG/DL (ref 8.5–9.9)
CHLORIDE BLD-SCNC: 109 MEQ/L (ref 95–107)
CO2: 22 MEQ/L (ref 20–31)
CREAT SERPL-MCNC: 1.09 MG/DL (ref 0.5–0.9)
EOSINOPHILS ABSOLUTE: 0 K/UL (ref 0–0.7)
EOSINOPHILS RELATIVE PERCENT: 0 %
GFR AFRICAN AMERICAN: 59.6
GFR NON-AFRICAN AMERICAN: 49.3
GLUCOSE BLD-MCNC: 128 MG/DL (ref 70–99)
HCT VFR BLD CALC: 36.8 % (ref 37–47)
HEMOGLOBIN: 12 G/DL (ref 12–16)
LACTIC ACID: 1.1 MMOL/L (ref 0.5–2.2)
LYMPHOCYTES ABSOLUTE: 0.3 K/UL (ref 1–4.8)
LYMPHOCYTES RELATIVE PERCENT: 5.9 %
MCH RBC QN AUTO: 30.3 PG (ref 27–31.3)
MCHC RBC AUTO-ENTMCNC: 32.6 % (ref 33–37)
MCV RBC AUTO: 92.9 FL (ref 82–100)
MONOCYTES ABSOLUTE: 0.1 K/UL (ref 0.2–0.8)
MONOCYTES RELATIVE PERCENT: 2.5 %
NEUTROPHILS ABSOLUTE: 4.6 K/UL (ref 1.4–6.5)
NEUTROPHILS RELATIVE PERCENT: 91.3 %
PDW BLD-RTO: 13.7 % (ref 11.5–14.5)
PLATELET # BLD: 154 K/UL (ref 130–400)
POTASSIUM REFLEX MAGNESIUM: 4.6 MEQ/L (ref 3.4–4.9)
POTASSIUM SERPL-SCNC: 4.6 MEQ/L (ref 3.4–4.9)
RBC # BLD: 3.97 M/UL (ref 4.2–5.4)
SODIUM BLD-SCNC: 143 MEQ/L (ref 135–144)
WBC # BLD: 5 K/UL (ref 4.8–10.8)

## 2020-01-02 PROCEDURE — 6370000000 HC RX 637 (ALT 250 FOR IP): Performed by: PSYCHIATRY & NEUROLOGY

## 2020-01-02 PROCEDURE — 70544 MR ANGIOGRAPHY HEAD W/O DYE: CPT

## 2020-01-02 PROCEDURE — 1210000000 HC MED SURG R&B

## 2020-01-02 PROCEDURE — 6370000000 HC RX 637 (ALT 250 FOR IP): Performed by: INTERNAL MEDICINE

## 2020-01-02 PROCEDURE — 83605 ASSAY OF LACTIC ACID: CPT

## 2020-01-02 PROCEDURE — 2580000003 HC RX 258: Performed by: EMERGENCY MEDICINE

## 2020-01-02 PROCEDURE — 99222 1ST HOSP IP/OBS MODERATE 55: CPT | Performed by: PSYCHIATRY & NEUROLOGY

## 2020-01-02 PROCEDURE — 6360000002 HC RX W HCPCS: Performed by: PSYCHIATRY & NEUROLOGY

## 2020-01-02 PROCEDURE — 85025 COMPLETE CBC W/AUTO DIFF WBC: CPT

## 2020-01-02 PROCEDURE — 92610 EVALUATE SWALLOWING FUNCTION: CPT

## 2020-01-02 PROCEDURE — 2580000003 HC RX 258: Performed by: PHYSICIAN ASSISTANT

## 2020-01-02 PROCEDURE — 2700000000 HC OXYGEN THERAPY PER DAY

## 2020-01-02 PROCEDURE — 6360000002 HC RX W HCPCS: Performed by: PHYSICIAN ASSISTANT

## 2020-01-02 PROCEDURE — 80048 BASIC METABOLIC PNL TOTAL CA: CPT

## 2020-01-02 PROCEDURE — 36415 COLL VENOUS BLD VENIPUNCTURE: CPT

## 2020-01-02 RX ORDER — PANTOPRAZOLE SODIUM 40 MG/1
40 TABLET, DELAYED RELEASE ORAL
Qty: 30 TABLET | Refills: 3 | Status: SHIPPED | OUTPATIENT
Start: 2020-01-03

## 2020-01-02 RX ORDER — LEVETIRACETAM 750 MG/1
750 TABLET ORAL 2 TIMES DAILY
Qty: 60 TABLET | Refills: 3 | Status: SHIPPED | OUTPATIENT
Start: 2020-01-02

## 2020-01-02 RX ORDER — DEXAMETHASONE 4 MG/1
4 TABLET ORAL EVERY 8 HOURS SCHEDULED
Qty: 42 TABLET | Refills: 0 | Status: ON HOLD | OUTPATIENT
Start: 2020-01-02 | End: 2020-01-17 | Stop reason: HOSPADM

## 2020-01-02 RX ORDER — DEXAMETHASONE 4 MG/1
4 TABLET ORAL EVERY 8 HOURS SCHEDULED
Status: DISCONTINUED | OUTPATIENT
Start: 2020-01-02 | End: 2020-01-07

## 2020-01-02 RX ORDER — PANTOPRAZOLE SODIUM 40 MG/1
40 TABLET, DELAYED RELEASE ORAL
Status: DISCONTINUED | OUTPATIENT
Start: 2020-01-03 | End: 2020-01-05

## 2020-01-02 RX ORDER — CEFAZOLIN SODIUM 2 G/50ML
2 SOLUTION INTRAVENOUS ONCE
Status: DISCONTINUED | OUTPATIENT
Start: 2020-01-06 | End: 2020-01-03

## 2020-01-02 RX ADMIN — DULOXETINE HYDROCHLORIDE 30 MG: 30 CAPSULE, DELAYED RELEASE ORAL at 08:50

## 2020-01-02 RX ADMIN — DEXAMETHASONE SODIUM PHOSPHATE 4 MG: 4 INJECTION, SOLUTION INTRAMUSCULAR; INTRAVENOUS at 01:21

## 2020-01-02 RX ADMIN — LEVETIRACETAM 750 MG: 500 TABLET, FILM COATED ORAL at 16:03

## 2020-01-02 RX ADMIN — METOPROLOL TARTRATE 25 MG: 25 TABLET, FILM COATED ORAL at 21:22

## 2020-01-02 RX ADMIN — Medication 10 ML: at 08:56

## 2020-01-02 RX ADMIN — TACROLIMUS 2 MG: 1 CAPSULE ORAL at 18:09

## 2020-01-02 RX ADMIN — METOPROLOL TARTRATE 25 MG: 25 TABLET, FILM COATED ORAL at 08:51

## 2020-01-02 RX ADMIN — MYCOPHENOLATE MOFETIL 500 MG: 250 CAPSULE ORAL at 08:51

## 2020-01-02 RX ADMIN — DEXAMETHASONE 4 MG: 4 TABLET ORAL at 23:05

## 2020-01-02 RX ADMIN — SULFAMETHOXAZOLE AND TRIMETHOPRIM 1 TABLET: 400; 80 TABLET ORAL at 08:51

## 2020-01-02 RX ADMIN — ENOXAPARIN SODIUM 40 MG: 40 INJECTION SUBCUTANEOUS at 08:50

## 2020-01-02 RX ADMIN — LEVETIRACETAM 750 MG: 500 TABLET, FILM COATED ORAL at 23:05

## 2020-01-02 RX ADMIN — Medication 10 ML: at 23:06

## 2020-01-02 RX ADMIN — DEXAMETHASONE SODIUM PHOSPHATE 4 MG: 4 INJECTION, SOLUTION INTRAMUSCULAR; INTRAVENOUS at 07:07

## 2020-01-02 RX ADMIN — Medication 10 ML: at 21:20

## 2020-01-02 RX ADMIN — TACROLIMUS 3 MG: 1 CAPSULE ORAL at 08:53

## 2020-01-02 RX ADMIN — DEXAMETHASONE 4 MG: 4 TABLET ORAL at 12:44

## 2020-01-02 RX ADMIN — LEVETIRACETAM 500 MG: 500 INJECTION, SOLUTION INTRAVENOUS at 03:16

## 2020-01-02 RX ADMIN — SODIUM CHLORIDE: 9 INJECTION, SOLUTION INTRAVENOUS at 08:50

## 2020-01-02 RX ADMIN — Medication 10 ML: at 08:57

## 2020-01-02 RX ADMIN — CINACALCET HYDROCHLORIDE 60 MG: 30 TABLET, FILM COATED ORAL at 08:50

## 2020-01-02 RX ADMIN — MYCOPHENOLATE MOFETIL 500 MG: 250 CAPSULE ORAL at 21:22

## 2020-01-02 ASSESSMENT — ENCOUNTER SYMPTOMS
DIARRHEA: 0
VOMITING: 0
SHORTNESS OF BREATH: 0
COUGH: 0
NAUSEA: 0

## 2020-01-02 ASSESSMENT — PAIN SCALES - GENERAL
PAINLEVEL_OUTOF10: 0

## 2020-01-02 ASSESSMENT — PAIN SCALES - WONG BAKER: WONGBAKER_NUMERICALRESPONSE: 0

## 2020-01-02 NOTE — PROGRESS NOTES
Jacky Watts is a 69 y.o. female patient.     Current Facility-Administered Medications   Medication Dose Route Frequency Provider Last Rate Last Dose    sodium chloride flush 0.9 % injection 10 mL  10 mL Intravenous BID Marie Rodas MD   10 mL at 01/02/20 0857    sodium chloride flush 0.9 % injection 10 mL  10 mL Intravenous 2 times per day Unice Adolfo Dojacquie, PA   10 mL at 01/02/20 0856    sodium chloride flush 0.9 % injection 10 mL  10 mL Intravenous PRN Unice Adolfo Dobrdonald, PA        magnesium hydroxide (MILK OF MAGNESIA) 400 MG/5ML suspension 30 mL  30 mL Oral Daily PRN Unice Adolfo Dobrdonald, PA        ondansetron [facility]) injection 4 mg  4 mg Intravenous Q6H PRN Unice Adolfo Dobrdonald, PA        enoxaparin (LOVENOX) injection 40 mg  40 mg Subcutaneous Daily Unice Adolfo Dobrdonald, PA   40 mg at 01/02/20 0850    dexamethasone (DECADRON) injection 4 mg  4 mg Intravenous Q6H Unice Adolfo Dobritch, PA   4 mg at 01/02/20 0345    levETIRAcetam (KEPPRA) 500 mg in sodium chloride 0.9 % 100 mL IVPB  500 mg Intravenous Q12H Valeria Trevino   Stopped at 01/02/20 0330    LORazepam (ATIVAN) injection 2 mg  2 mg Intravenous Q6H PRN [employer] Adolfo Dojacquie, PA        sulfamethoxazole-trimethoprim (BACTRIM;SEPTRA) 400-80 MG per tablet 1 tablet  1 tablet Oral Daily Zohaib Ghotra MD   1 tablet at 01/02/20 0851    metoprolol tartrate (LOPRESSOR) tablet 25 mg  25 mg Oral BID Zohaib Ghotra MD   25 mg at 01/02/20 0851    mycophenolate (CELLCEPT) capsule 500 mg  500 mg Oral BID Zohaib Ghotra MD   500 mg at 01/02/20 0851    DULoxetine (CYMBALTA) extended release capsule 30 mg  30 mg Oral Daily Zohaib Ghotra MD   30 mg at 01/02/20 0850    cinacalcet (SENSIPAR) tablet 60 mg  60 mg Oral Daily Zohaib Ghotra MD   60 mg at 01/02/20 0850    tacrolimus (PROGRAF) capsule 3 mg  3 mg Oral QAM Zohaib Ghotra MD   3 mg at 01/02/20 0853    tacrolimus (PROGRAF) capsule 2 mg  2 mg Oral QPM Zohaib Ghotra MD   2 mg at 01/01/20 1744    hydrALAZINE (APRESOLINE) injection 10 mg  10 mg Intravenous Q6H PRN Austin Eid MD   10 mg at 01/01/20 1613     Allergies   Allergen Reactions    Benadryl [Diphenhydramine]      Unknown      Chlorhexidine      unknown     Active Problems:    Brain neoplasm [facility])  Resolved Problems:    * No resolved hospital problems. *    Blood pressure (!) 142/64, pulse 65, temperature 98.7 °F (37.1 °C), temperature source Oral, resp. rate 23, height 5' 1\" (1.549 m), weight 149 lb 0.5 oz (67.6 kg), SpO2 95 %. Subjective:  Symptoms:  She reports malaise and weakness. No shortness of breath, cough, chest pain, headache, chest pressure, anorexia, diarrhea or anxiety. Diet:  No nausea or vomiting. Objective:  General Appearance:  Well-appearing, comfortable and in no acute distress. Vital signs: (most recent): Blood pressure (!) 142/64, pulse 65, temperature 98.7 °F (37.1 °C), temperature source Oral, resp. rate 23, height 5' 1\" (1.549 m), weight 149 lb 0.5 oz (67.6 kg), SpO2 95 %. HEENT: Normal HEENT exam.    Lungs:  Normal effort. Heart: Normal rate. Regular rhythm. S1 normal.    Abdomen: Abdomen is soft. Bowel sounds are normal.   There is no epigastric area or suprapubic area tenderness. Pulses: Distal pulses are intact. Neurological: Patient is alert. Pupils:  Pupils are equal, round, and reactive to light. Skin:  Warm and dry.       Lab Results   Component Value Date    WBC 5.0 01/02/2020    HGB 12.0 01/02/2020    HCT 36.8 (L) 01/02/2020    MCV 92.9 01/02/2020     01/02/2020     Lab Results   Component Value Date     01/02/2020    K 4.6 01/02/2020    K 4.6 01/02/2020     01/02/2020    CO2 22 01/02/2020    BUN 26 01/02/2020    CREATININE 1.09 01/02/2020    GLUCOSE 128 01/02/2020    CALCIUM 9.4 01/02/2020      Past Medical History:   Diagnosis Date    Anxiety     Cardiomegaly     Chronic kidney disease     Colon polyps     Depression    

## 2020-01-02 NOTE — FLOWSHEET NOTE
Patient ok for DC per Dr. Lorrie Davila.  Electronically signed by Alex Davis RN on 1/2/20 at 1:24 PM

## 2020-01-02 NOTE — PROGRESS NOTES
Uneventful shift. Monitor has been sinus bradycardia to sinus rhythm without ectopy. Vital signs stable. Moves all extremities to command. Up once to bathroom with steady gait.

## 2020-01-02 NOTE — PROGRESS NOTES
[facility]   Facility/Department: [facility]  Speech Language Pathology  Clinical Bedside Swallow Evaluation    Camila Pierce  : 1945 (75 y.o.)   MRN: [id number]  ROOM: [place] [address]  ADMISSION DATE: 2020  PATIENT DIAGNOSIS(ES): Brain neoplasm [facility]) [D49.6]  Chief Complaint   Patient presents with    Altered Mental Status     Patient Active Problem List    Diagnosis Date Noted    Cerebral malignant neoplasm ([facility] 2020    Brain neoplasm ([facility] 2020    Acid reflux 2017    Angina, class IV ([facility] 2017    Mixed dyslipidemia 2017    Elevated blood uric acid level 2017    Gout 2015    Combined fat and carbohydrate induced hyperlipemia 2015    End stage kidney disease ([facility] 10/27/2011    Essential hypertension 10/27/2011     Past Medical History:   Diagnosis Date    Anxiety     Cardiomegaly     Chronic kidney disease     Colon polyps     Depression     Gallbladder polyp     GERD (gastroesophageal reflux disease)     Gout     Gout     Hypertension     Hyperuricemia     Hypothyroid     Vitamin D deficiency      Past Surgical History:   Procedure Laterality Date    DIALYSIS FISTULA CREATION Left     KIDNEY TRANSPLANT  2018     Allergies   Allergen Reactions    Benadryl [Diphenhydramine]      Unknown      Chlorhexidine      unknown       DATE ONSET: 20    Date of Evaluation: 2020   Evaluating Therapist: JOY Castillo    Recommended Diet and Intervention  Diet Solids Recommendation: Regular  Liquid Consistency Recommendation:  Thin  Recommended Form of Meds: PO    Compensatory Swallowing Strategies  Compensatory Swallowing Strategies: Upright as possible for all oral intake    Reason for Referral  Solange Crews was referred for a bedside swallow evaluation to assess the efficiency of her swallow function, identify signs and symptoms of aspiration and make recommendations regarding safe dietary consistencies, effective compensatory strategies, and safe eating environment. General  Chart Reviewed: Yes  Behavior/Cognition: Alert; Cooperative;Pleasant mood  Respiratory Status: Room air  Breath Sounds: Clear  O2 Device: None (Room air)  Communication Observation: Functional(Pt speaks [place] but is able to communicate with simple [place] )  Follows Directions: Simple  Dentition: Some missing teeth  Patient Positioning: Upright in bed  Baseline Vocal Quality: Normal  Prior Dysphagia History: No hx of dysphagia   Consistencies Administered: Reg solid; Thin - teaspoon; Thin - cup; Thin - straw;Dysphagia Pureed (Dysphagia I); Dysphagia Soft and Bite-Sized (Dysphagia III)    Current Diet level:  Current Diet : Regular  Current Liquid Diet : Thin    Oral Motor Deficits  Oral/Motor  Oral Motor: Within functional limits    Oral Phase Dysfunction  Oral Phase  Oral Phase: WFL  Oral Phase  Oral Phase - Comment: Pt presents with a functional oral phase on all given PO trials with no s/s of aspiration or difficulty. No lingual deviation observed this date. Indicators of Pharyngeal Phase Dysfunction   Pharyngeal Phase  Pharyngeal Phase: WFL  Pharyngeal Phase   Pharyngeal: Pt presents with a suspected functional pharyngeal phase on all given PO trials with no s/s of aspiration or difficulty. Impression  Dysphagia Diagnosis: Swallow function appears grossly intact  Dysphagia Impression : Pt's swallow seems intact on this date with no s/s of aspiration on regular/thin trials.   Dysphagia Outcome Severity Scale: Level 7: Normal in all situations     Treatment Plan  Requires SLP Intervention: No  Duration/Frequency of Treatment: n/a       Prognosis  Individuals consulted  Consulted and agree with results and recommendations: Patient;RN(DIPAK Quinones)    Education  Patient Education: Pt educated on results of BSE and diet recommendations   Patient Education Response: Verbalizes understanding;Needs reinforcement(Due to language barrier)  Safety Devices in place: Yes  Type of devices:  All fall risk precautions in place    Pain:  Pain Assessment  Patient Currently in Pain: No  Pain Assessment: Faces  Pain Level: 0  Response to Pain Intervention: Asleep with RR greater than 10       Therapy Time  SLP Individual Minutes  Time In: 6797  Time Out: 0920  Minutes: 10              Signature: Electronically signed by JOY Rush on 1/2/2020 at 9:47 AM

## 2020-01-02 NOTE — FLOWSHEET NOTE
Call from Dr. Laney Boast, plan for surgery tomorrow.  Electronically signed by Shannan Mcdaniel RN on 1/2/20 at 1:44 PM

## 2020-01-02 NOTE — FLOWSHEET NOTE
0700-Hand off of care at bedside  0800-Pt resting in bed  0830-Pt sitting up eating breakfast   0900-Took AM medications well and speech therapy in to assess patient  0945-Dr. Curran rounded and IV fluids discontinued and transfer orders received   1000-MRI made aware that patient is transferring. Pt is to go for MRA and then to [address]. Report called to Alcides Adair on [address]. Pt's home medications will be taken over to [facility]. [place] Care Coordinator will be calling daughter regarding discharge needs and will make her aware that patient is transferring. Chart will be sent with the patient.

## 2020-01-02 NOTE — CARE COORDINATION
[facility] EMERGENCY [facility] Case Management Initial Discharge Assessment    Met with Patient and Family to discuss discharge plan. PCP: Regan Couch, DO                                Date of Last Visit: 1 year ago     If no PCP, list provided? N/A    Discharge Planning    Living Arrangements: independently at home    Who do you live with? dtr    Who helps you with your care:  self or family    If lives at home:     Do you have any barriers navigating in your home? no    Patient can perform ADL? Yes    Current Services (outpatient and in home) :  None    Dialysis: No--patient has lt fistula from previous need for dialysis, not currently on dialysis    Is transportation available to get to your appointments? Yes--daughter transports the patient    DME Equipment:  yes - has walker does not use    Respiratory equipment: None    Respiratory provider:  no     Pharmacy:  yes - [facility]    Consult with Medication Assistance Program?  No      Patient agreeable to [facility]? No    Patient agreeable to SNF/Rehab? No    Other discharge needs identified? N/A    Freedom of choice list provided with basic dialogue that supports the patient's individualized plan of care/goals and shares the quality data associated with the providers. Yes    Does Patient Have a High-Risk for Readmission Diagnosis (CHF, PN, MI, COPD)? No      The plan for Transition of Care is related to the following treatment goals: to feel better and go home. Initial Discharge Plan? (Note: please see concurrent daily documentation for any updates after initial note). To return home with daughter. No needs identified. Patient will need to either stay and have surgery on Monday, or go home and come back in for neurosurgery Monday with dr. Emma Rock.      The Patient and/or patient representative: patient/dtr was provided with choice of any post-acute providers for care and equipment and agrees with discharge plan  Yes    Electronically signed by Saadia Lomax RN on 1/2/2020 at 10:38 AM

## 2020-01-02 NOTE — FLOWSHEET NOTE
Pt went to MRI and then will go to [address] [place] [address].  Patient's home medications walked over to [address] [place] and given to [facility]

## 2020-01-02 NOTE — CONSULTS
RAFAL Oleary        ondansetron [facility]) injection 4 mg  4 mg Intravenous Q6H PRN Meg Ernsto, [address]        enoxaparin (LOVENOX) injection 40 mg  40 mg Subcutaneous Daily [place], [address]   40 mg at 01/02/20 0850    LORazepam (ATIVAN) injection 2 mg  2 mg Intravenous Q6H PRN [place] Pema Tennyson, [address]        sulfamethoxazole-trimethoprim (BACTRIM;SEPTRA) 400-80 MG per tablet 1 tablet  1 tablet Oral Daily Sofya Cota MD   1 tablet at 01/02/20 0851    metoprolol tartrate (LOPRESSOR) tablet 25 mg  25 mg Oral BID Sofya Cota MD   25 mg at 01/02/20 0851    mycophenolate (CELLCEPT) capsule 500 mg  500 mg Oral BID Sofya Cota MD   500 mg at 01/02/20 0851    DULoxetine (CYMBALTA) extended release capsule 30 mg  30 mg Oral Daily Sofya Cota MD   30 mg at 01/02/20 0850    cinacalcet (SENSIPAR) tablet 60 mg  60 mg Oral Daily Sofya Cota MD   60 mg at 01/02/20 0850    tacrolimus (PROGRAF) capsule 3 mg  3 mg Oral QAM Sofya Cota MD   3 mg at 01/02/20 0853    tacrolimus (PROGRAF) capsule 2 mg  2 mg Oral QPM Sofya Cota MD   2 mg at 01/01/20 1744    hydrALAZINE (APRESOLINE) injection 10 mg  10 mg Intravenous Q6H PRN Sofya Cota MD   10 mg at 01/01/20 1613        Objective:   BP (!) 142/64   Pulse 65   Temp 98.7 °F (37.1 °C) (Oral)   Resp 23   Ht 5' 1\" (1.549 m)   Wt 149 lb 0.5 oz (67.6 kg)   SpO2 95%   BMI 28.16 kg/m²     Physical Exam  Vitals signs reviewed. Eyes:      Pupils: Pupils are equal, round, and reactive to light. Neck:      Musculoskeletal: Normal range of motion. Cardiovascular:      Rate and Rhythm: Normal rate and regular rhythm. Heart sounds: No murmur. Pulmonary:      Effort: Pulmonary effort is normal.      Breath sounds: Normal breath sounds. Musculoskeletal: Normal range of motion. Neurological:      Mental Status: She is alert and oriented to person, place, and time. Cranial Nerves: No cranial nerve deficit.       Sensory: No sensory regions are unremarkable. No Chiari malformation. . Hippocampal formations are symmetric in signal intensity and morphology bilaterally given the mass effect on the left compared with the normal-appearing right. No intrinsic noncontrast T1 shortening. T1 hypointense edema throughout the visualized left frontal, parietal, temporal lobe and basal ganglia. Abnormal T2 FLAIR hyperintensity throughout the left frontal, parietal and temporal lobe cortical and subcortical white matter and also extending in the left basal ganglia. Compression of the left lateral ventricular system. Approximately 0.56 cm left right subfalcine herniation. There is effacement of the left ambient cistern and the basilar cistern region. No tonsillar herniation. There is a dominant left vertebral artery, otherwise, normal expected appearance of visualized T2 flow voids. Optic globes and orbital contents are within normal limits. Visualized paranasal sinuses and mastoid air cells are free from fluid or mass. . No intraparenchymal hemorrhage. Pulsation artifact limits evaluation of the postcontrast exam and the cerebellum. Focal large enhancing mass in the left middle cranial fossa cyst left lung/left anterior temporal lobe, measuring approximately 2.3 cm anterior posterior by 2.1 cm transverse by 1.5 cm craniocaudal. No dural venous sinus thrombosis or occlusion is identified. No other enhancing mass is seen. 1. Enhancing mass/malignancy in the left middle cranial fossa/left anterior temporal lobe, findings suspicious for meningioma although glioblastoma is not excluded. There is extensive edema extending throughout the left frontal lobe, left parietal lobe and left basal ganglia/temporal lobe, without evidence of restricted diffusion, greater than expected. Findings could reflect extensive edema from the mass/malignancy. Other etiologies are not excluded. . Neurosurgical/neurological evaluation recommended.  2. Left to right subfalcine herniation time.  Patient may be discharged today.       Plan:

## 2020-01-03 ENCOUNTER — APPOINTMENT (OUTPATIENT)
Dept: MRI IMAGING | Age: 75
DRG: 025 | End: 2020-01-03
Payer: COMMERCIAL

## 2020-01-03 ENCOUNTER — ANESTHESIA EVENT (OUTPATIENT)
Dept: OPERATING ROOM | Age: 75
DRG: 025 | End: 2020-01-03
Payer: COMMERCIAL

## 2020-01-03 ENCOUNTER — ANESTHESIA (OUTPATIENT)
Dept: OPERATING ROOM | Age: 75
DRG: 025 | End: 2020-01-03
Payer: COMMERCIAL

## 2020-01-03 ENCOUNTER — APPOINTMENT (OUTPATIENT)
Dept: CT IMAGING | Age: 75
DRG: 025 | End: 2020-01-03
Payer: COMMERCIAL

## 2020-01-03 VITALS — OXYGEN SATURATION: 100 % | DIASTOLIC BLOOD PRESSURE: 81 MMHG | TEMPERATURE: 98.6 F | SYSTOLIC BLOOD PRESSURE: 190 MMHG

## 2020-01-03 LAB
ABO/RH: NORMAL
ANION GAP SERPL CALCULATED.3IONS-SCNC: 14 MEQ/L (ref 9–15)
ANTIBODY SCREEN: NORMAL
BASOPHILS ABSOLUTE: 0 K/UL (ref 0–0.2)
BASOPHILS RELATIVE PERCENT: 0.4 %
BUN BLDV-MCNC: 30 MG/DL (ref 8–23)
CALCIUM SERPL-MCNC: 9 MG/DL (ref 8.5–9.9)
CHLORIDE BLD-SCNC: 107 MEQ/L (ref 95–107)
CO2: 22 MEQ/L (ref 20–31)
CREAT SERPL-MCNC: 1.13 MG/DL (ref 0.5–0.9)
EOSINOPHILS ABSOLUTE: 0 K/UL (ref 0–0.7)
EOSINOPHILS RELATIVE PERCENT: 0 %
GFR AFRICAN AMERICAN: 57.2
GFR NON-AFRICAN AMERICAN: 47.3
GLUCOSE BLD-MCNC: 127 MG/DL (ref 70–99)
HCT VFR BLD CALC: 35.7 % (ref 37–47)
HEMOGLOBIN: 11.7 G/DL (ref 12–16)
LACTIC ACID: 2 MMOL/L (ref 0.5–2.2)
LYMPHOCYTES ABSOLUTE: 0.3 K/UL (ref 1–4.8)
LYMPHOCYTES RELATIVE PERCENT: 4.5 %
MCH RBC QN AUTO: 30.2 PG (ref 27–31.3)
MCHC RBC AUTO-ENTMCNC: 32.8 % (ref 33–37)
MCV RBC AUTO: 92.2 FL (ref 82–100)
MONOCYTES ABSOLUTE: 0.4 K/UL (ref 0.2–0.8)
MONOCYTES RELATIVE PERCENT: 5.4 %
NEUTROPHILS ABSOLUTE: 6.6 K/UL (ref 1.4–6.5)
NEUTROPHILS RELATIVE PERCENT: 89.7 %
PDW BLD-RTO: 14.2 % (ref 11.5–14.5)
PLATELET # BLD: 150 K/UL (ref 130–400)
POTASSIUM REFLEX MAGNESIUM: 4.7 MEQ/L (ref 3.4–4.9)
RBC # BLD: 3.87 M/UL (ref 4.2–5.4)
SLIDE REVIEW: ABNORMAL
SODIUM BLD-SCNC: 143 MEQ/L (ref 135–144)
WBC # BLD: 7.3 K/UL (ref 4.8–10.8)

## 2020-01-03 PROCEDURE — 2709999900 HC NON-CHARGEABLE SUPPLY: Performed by: NEUROLOGICAL SURGERY

## 2020-01-03 PROCEDURE — 6370000000 HC RX 637 (ALT 250 FOR IP): Performed by: NEUROLOGICAL SURGERY

## 2020-01-03 PROCEDURE — 6360000002 HC RX W HCPCS: Performed by: FAMILY MEDICINE

## 2020-01-03 PROCEDURE — 93010 ELECTROCARDIOGRAM REPORT: CPT | Performed by: INTERNAL MEDICINE

## 2020-01-03 PROCEDURE — 83605 ASSAY OF LACTIC ACID: CPT

## 2020-01-03 PROCEDURE — 2580000003 HC RX 258: Performed by: INTERNAL MEDICINE

## 2020-01-03 PROCEDURE — 6370000000 HC RX 637 (ALT 250 FOR IP): Performed by: INTERNAL MEDICINE

## 2020-01-03 PROCEDURE — 6370000000 HC RX 637 (ALT 250 FOR IP): Performed by: PSYCHIATRY & NEUROLOGY

## 2020-01-03 PROCEDURE — 2580000003 HC RX 258: Performed by: NURSE ANESTHETIST, CERTIFIED REGISTERED

## 2020-01-03 PROCEDURE — 7100000000 HC PACU RECOVERY - FIRST 15 MIN

## 2020-01-03 PROCEDURE — 3600000014 HC SURGERY LEVEL 4 ADDTL 15MIN: Performed by: NEUROLOGICAL SURGERY

## 2020-01-03 PROCEDURE — 2500000003 HC RX 250 WO HCPCS: Performed by: NURSE ANESTHETIST, CERTIFIED REGISTERED

## 2020-01-03 PROCEDURE — 2000000000 HC ICU R&B

## 2020-01-03 PROCEDURE — 85025 COMPLETE CBC W/AUTO DIFF WBC: CPT

## 2020-01-03 PROCEDURE — 2580000003 HC RX 258: Performed by: NEUROLOGICAL SURGERY

## 2020-01-03 PROCEDURE — 3700000001 HC ADD 15 MINUTES (ANESTHESIA): Performed by: NEUROLOGICAL SURGERY

## 2020-01-03 PROCEDURE — 2580000003 HC RX 258: Performed by: PHYSICIAN ASSISTANT

## 2020-01-03 PROCEDURE — 88341 IMHCHEM/IMCYTCHM EA ADD ANTB: CPT

## 2020-01-03 PROCEDURE — 86901 BLOOD TYPING SEROLOGIC RH(D): CPT

## 2020-01-03 PROCEDURE — 2780000010 HC IMPLANT OTHER: Performed by: NEUROLOGICAL SURGERY

## 2020-01-03 PROCEDURE — 6360000002 HC RX W HCPCS: Performed by: PSYCHIATRY & NEUROLOGY

## 2020-01-03 PROCEDURE — 3700000000 HC ANESTHESIA ATTENDED CARE: Performed by: NEUROLOGICAL SURGERY

## 2020-01-03 PROCEDURE — 36415 COLL VENOUS BLD VENIPUNCTURE: CPT

## 2020-01-03 PROCEDURE — 86923 COMPATIBILITY TEST ELECTRIC: CPT

## 2020-01-03 PROCEDURE — 88331 PATH CONSLTJ SURG 1 BLK 1SPC: CPT

## 2020-01-03 PROCEDURE — 2500000003 HC RX 250 WO HCPCS: Performed by: ANESTHESIOLOGY

## 2020-01-03 PROCEDURE — 2500000003 HC RX 250 WO HCPCS: Performed by: NEUROLOGICAL SURGERY

## 2020-01-03 PROCEDURE — 6360000002 HC RX W HCPCS: Performed by: INTERNAL MEDICINE

## 2020-01-03 PROCEDURE — 88307 TISSUE EXAM BY PATHOLOGIST: CPT

## 2020-01-03 PROCEDURE — 70450 CT HEAD/BRAIN W/O DYE: CPT

## 2020-01-03 PROCEDURE — 6360000002 HC RX W HCPCS: Performed by: NEUROLOGICAL SURGERY

## 2020-01-03 PROCEDURE — 7100000001 HC PACU RECOVERY - ADDTL 15 MIN

## 2020-01-03 PROCEDURE — 2720000010 HC SURG SUPPLY STERILE: Performed by: NEUROLOGICAL SURGERY

## 2020-01-03 PROCEDURE — 6360000002 HC RX W HCPCS: Performed by: NURSE ANESTHETIST, CERTIFIED REGISTERED

## 2020-01-03 PROCEDURE — 6370000000 HC RX 637 (ALT 250 FOR IP)

## 2020-01-03 PROCEDURE — 86900 BLOOD TYPING SEROLOGIC ABO: CPT

## 2020-01-03 PROCEDURE — 80048 BASIC METABOLIC PNL TOTAL CA: CPT

## 2020-01-03 PROCEDURE — 3600000004 HC SURGERY LEVEL 4 BASE: Performed by: NEUROLOGICAL SURGERY

## 2020-01-03 PROCEDURE — 99233 SBSQ HOSP IP/OBS HIGH 50: CPT | Performed by: PSYCHIATRY & NEUROLOGY

## 2020-01-03 PROCEDURE — 70551 MRI BRAIN STEM W/O DYE: CPT

## 2020-01-03 PROCEDURE — 00B70ZX EXCISION OF CEREBRAL HEMISPHERE, OPEN APPROACH, DIAGNOSTIC: ICD-10-PCS | Performed by: NEUROLOGICAL SURGERY

## 2020-01-03 PROCEDURE — 86850 RBC ANTIBODY SCREEN: CPT

## 2020-01-03 PROCEDURE — 6360000002 HC RX W HCPCS: Performed by: NURSE PRACTITIONER

## 2020-01-03 PROCEDURE — 88342 IMHCHEM/IMCYTCHM 1ST ANTB: CPT

## 2020-01-03 DEVICE — DURAGEN® DURAL GRAFT MATRIX 1PK, 2X2 DOM
Type: IMPLANTABLE DEVICE | Site: TEMPORAL LOBE | Status: FUNCTIONAL
Brand: DURAGEN®

## 2020-01-03 RX ORDER — LABETALOL 20 MG/4 ML (5 MG/ML) INTRAVENOUS SYRINGE
10
Status: DISCONTINUED | OUTPATIENT
Start: 2020-01-03 | End: 2020-01-09 | Stop reason: HOSPADM

## 2020-01-03 RX ORDER — REMIFENTANIL HYDROCHLORIDE 1 MG/ML
INJECTION, POWDER, LYOPHILIZED, FOR SOLUTION INTRAVENOUS PRN
Status: DISCONTINUED | OUTPATIENT
Start: 2020-01-03 | End: 2020-01-03

## 2020-01-03 RX ORDER — HYDROCODONE BITARTRATE AND ACETAMINOPHEN 5; 325 MG/1; MG/1
2 TABLET ORAL PRN
Status: DISCONTINUED | OUTPATIENT
Start: 2020-01-03 | End: 2020-01-03 | Stop reason: HOSPADM

## 2020-01-03 RX ORDER — FUROSEMIDE 10 MG/ML
INJECTION INTRAMUSCULAR; INTRAVENOUS PRN
Status: DISCONTINUED | OUTPATIENT
Start: 2020-01-03 | End: 2020-01-03 | Stop reason: SDUPTHER

## 2020-01-03 RX ORDER — LABETALOL HYDROCHLORIDE 5 MG/ML
INJECTION, SOLUTION INTRAVENOUS PRN
Status: DISCONTINUED | OUTPATIENT
Start: 2020-01-03 | End: 2020-01-03 | Stop reason: SDUPTHER

## 2020-01-03 RX ORDER — HYDROCODONE BITARTRATE AND ACETAMINOPHEN 5; 325 MG/1; MG/1
1 TABLET ORAL PRN
Status: DISCONTINUED | OUTPATIENT
Start: 2020-01-03 | End: 2020-01-03 | Stop reason: HOSPADM

## 2020-01-03 RX ORDER — SODIUM CHLORIDE 0.9 % (FLUSH) 0.9 %
10 SYRINGE (ML) INJECTION EVERY 12 HOURS SCHEDULED
Status: DISCONTINUED | OUTPATIENT
Start: 2020-01-03 | End: 2020-01-09 | Stop reason: HOSPADM

## 2020-01-03 RX ORDER — PROPOFOL 10 MG/ML
INJECTION, EMULSION INTRAVENOUS PRN
Status: DISCONTINUED | OUTPATIENT
Start: 2020-01-03 | End: 2020-01-03 | Stop reason: SDUPTHER

## 2020-01-03 RX ORDER — ESMOLOL HYDROCHLORIDE 10 MG/ML
INJECTION INTRAVENOUS PRN
Status: DISCONTINUED | OUTPATIENT
Start: 2020-01-03 | End: 2020-01-03 | Stop reason: SDUPTHER

## 2020-01-03 RX ORDER — HYDRALAZINE HYDROCHLORIDE 20 MG/ML
INJECTION INTRAMUSCULAR; INTRAVENOUS PRN
Status: DISCONTINUED | OUTPATIENT
Start: 2020-01-03 | End: 2020-01-03 | Stop reason: SDUPTHER

## 2020-01-03 RX ORDER — OXYCODONE HYDROCHLORIDE 5 MG/1
5 TABLET ORAL EVERY 4 HOURS PRN
Status: DISCONTINUED | OUTPATIENT
Start: 2020-01-03 | End: 2020-01-04

## 2020-01-03 RX ORDER — ROCURONIUM BROMIDE 10 MG/ML
INJECTION, SOLUTION INTRAVENOUS PRN
Status: DISCONTINUED | OUTPATIENT
Start: 2020-01-03 | End: 2020-01-03 | Stop reason: SDUPTHER

## 2020-01-03 RX ORDER — DOCUSATE SODIUM 100 MG/1
100 CAPSULE, LIQUID FILLED ORAL 2 TIMES DAILY
Status: DISCONTINUED | OUTPATIENT
Start: 2020-01-03 | End: 2020-01-09 | Stop reason: HOSPADM

## 2020-01-03 RX ORDER — DEXAMETHASONE SODIUM PHOSPHATE 10 MG/ML
INJECTION INTRAMUSCULAR; INTRAVENOUS PRN
Status: DISCONTINUED | OUTPATIENT
Start: 2020-01-03 | End: 2020-01-03 | Stop reason: SDUPTHER

## 2020-01-03 RX ORDER — BUPIVACAINE HYDROCHLORIDE AND EPINEPHRINE 5; 5 MG/ML; UG/ML
INJECTION, SOLUTION EPIDURAL; INTRACAUDAL; PERINEURAL PRN
Status: DISCONTINUED | OUTPATIENT
Start: 2020-01-03 | End: 2020-01-03 | Stop reason: ALTCHOICE

## 2020-01-03 RX ORDER — ONDANSETRON 2 MG/ML
4 INJECTION INTRAMUSCULAR; INTRAVENOUS EVERY 6 HOURS PRN
Status: DISCONTINUED | OUTPATIENT
Start: 2020-01-03 | End: 2020-01-09 | Stop reason: HOSPADM

## 2020-01-03 RX ORDER — SODIUM CHLORIDE 9 MG/ML
INJECTION, SOLUTION INTRAVENOUS CONTINUOUS
Status: DISCONTINUED | OUTPATIENT
Start: 2020-01-03 | End: 2020-01-09 | Stop reason: HOSPADM

## 2020-01-03 RX ORDER — 0.9 % SODIUM CHLORIDE 0.9 %
250 INTRAVENOUS SOLUTION INTRAVENOUS ONCE
Status: DISCONTINUED | OUTPATIENT
Start: 2020-01-03 | End: 2020-01-09 | Stop reason: HOSPADM

## 2020-01-03 RX ORDER — SODIUM CHLORIDE 9 MG/ML
INJECTION, SOLUTION INTRAVENOUS CONTINUOUS PRN
Status: DISCONTINUED | OUTPATIENT
Start: 2020-01-03 | End: 2020-01-03 | Stop reason: SDUPTHER

## 2020-01-03 RX ORDER — LORAZEPAM 2 MG/ML
1 INJECTION INTRAMUSCULAR ONCE
Status: COMPLETED | OUTPATIENT
Start: 2020-01-03 | End: 2020-01-03

## 2020-01-03 RX ORDER — HYDRALAZINE HYDROCHLORIDE 20 MG/ML
10 INJECTION INTRAMUSCULAR; INTRAVENOUS
Status: DISCONTINUED | OUTPATIENT
Start: 2020-01-03 | End: 2020-01-09 | Stop reason: HOSPADM

## 2020-01-03 RX ORDER — METOCLOPRAMIDE HYDROCHLORIDE 5 MG/ML
10 INJECTION INTRAMUSCULAR; INTRAVENOUS
Status: DISCONTINUED | OUTPATIENT
Start: 2020-01-03 | End: 2020-01-03 | Stop reason: HOSPADM

## 2020-01-03 RX ORDER — ONDANSETRON 2 MG/ML
4 INJECTION INTRAMUSCULAR; INTRAVENOUS
Status: DISCONTINUED | OUTPATIENT
Start: 2020-01-03 | End: 2020-01-03 | Stop reason: HOSPADM

## 2020-01-03 RX ORDER — LIDOCAINE HYDROCHLORIDE 20 MG/ML
INJECTION, SOLUTION INTRAVENOUS PRN
Status: DISCONTINUED | OUTPATIENT
Start: 2020-01-03 | End: 2020-01-03 | Stop reason: SDUPTHER

## 2020-01-03 RX ORDER — MIDAZOLAM HYDROCHLORIDE 1 MG/ML
INJECTION INTRAMUSCULAR; INTRAVENOUS PRN
Status: DISCONTINUED | OUTPATIENT
Start: 2020-01-03 | End: 2020-01-03 | Stop reason: SDUPTHER

## 2020-01-03 RX ORDER — MAGNESIUM HYDROXIDE 1200 MG/15ML
LIQUID ORAL CONTINUOUS PRN
Status: COMPLETED | OUTPATIENT
Start: 2020-01-03 | End: 2020-01-03

## 2020-01-03 RX ORDER — ONDANSETRON 2 MG/ML
INJECTION INTRAMUSCULAR; INTRAVENOUS PRN
Status: DISCONTINUED | OUTPATIENT
Start: 2020-01-03 | End: 2020-01-03 | Stop reason: SDUPTHER

## 2020-01-03 RX ORDER — FENTANYL CITRATE 50 UG/ML
50 INJECTION, SOLUTION INTRAMUSCULAR; INTRAVENOUS EVERY 10 MIN PRN
Status: DISCONTINUED | OUTPATIENT
Start: 2020-01-03 | End: 2020-01-03 | Stop reason: HOSPADM

## 2020-01-03 RX ORDER — MEPERIDINE HYDROCHLORIDE 25 MG/ML
12.5 INJECTION INTRAMUSCULAR; INTRAVENOUS; SUBCUTANEOUS EVERY 5 MIN PRN
Status: DISCONTINUED | OUTPATIENT
Start: 2020-01-03 | End: 2020-01-03 | Stop reason: HOSPADM

## 2020-01-03 RX ORDER — CEFAZOLIN SODIUM 2 G/50ML
2 SOLUTION INTRAVENOUS EVERY 8 HOURS
Status: COMPLETED | OUTPATIENT
Start: 2020-01-03 | End: 2020-01-04

## 2020-01-03 RX ORDER — FENTANYL CITRATE 50 UG/ML
INJECTION, SOLUTION INTRAMUSCULAR; INTRAVENOUS PRN
Status: DISCONTINUED | OUTPATIENT
Start: 2020-01-03 | End: 2020-01-03 | Stop reason: SDUPTHER

## 2020-01-03 RX ORDER — CEFAZOLIN SODIUM 2 G/50ML
2 SOLUTION INTRAVENOUS
Status: COMPLETED | OUTPATIENT
Start: 2020-01-03 | End: 2020-01-03

## 2020-01-03 RX ORDER — SODIUM CHLORIDE 9 MG/ML
INJECTION, SOLUTION INTRAVENOUS
Status: COMPLETED
Start: 2020-01-03 | End: 2020-01-03

## 2020-01-03 RX ORDER — SODIUM CHLORIDE 0.9 % (FLUSH) 0.9 %
10 SYRINGE (ML) INJECTION PRN
Status: DISCONTINUED | OUTPATIENT
Start: 2020-01-03 | End: 2020-01-09 | Stop reason: HOSPADM

## 2020-01-03 RX ORDER — PREDNISONE 2.5 MG
5 TABLET ORAL DAILY
Status: DISCONTINUED | OUTPATIENT
Start: 2020-01-03 | End: 2020-01-09 | Stop reason: HOSPADM

## 2020-01-03 RX ADMIN — LEVETIRACETAM 750 MG: 500 TABLET, FILM COATED ORAL at 21:15

## 2020-01-03 RX ADMIN — FUROSEMIDE 10 MG: 10 INJECTION, SOLUTION INTRAVENOUS at 14:40

## 2020-01-03 RX ADMIN — PROPOFOL 150 MG: 10 INJECTION, EMULSION INTRAVENOUS at 14:25

## 2020-01-03 RX ADMIN — ROCURONIUM BROMIDE 20 MG: 10 INJECTION INTRAVENOUS at 17:14

## 2020-01-03 RX ADMIN — ONDANSETRON 4 MG: 2 INJECTION INTRAMUSCULAR; INTRAVENOUS at 18:02

## 2020-01-03 RX ADMIN — ESMOLOL HYDROCHLORIDE 20 MG: 100 INJECTION, SOLUTION INTRAVENOUS at 18:38

## 2020-01-03 RX ADMIN — DEXAMETHASONE 4 MG: 4 TABLET ORAL at 21:15

## 2020-01-03 RX ADMIN — MYCOPHENOLATE MOFETIL 500 MG: 250 CAPSULE ORAL at 10:14

## 2020-01-03 RX ADMIN — PROPOFOL 50 MG: 10 INJECTION, EMULSION INTRAVENOUS at 14:49

## 2020-01-03 RX ADMIN — SUGAMMADEX 200 MG: 100 INJECTION, SOLUTION INTRAVENOUS at 18:44

## 2020-01-03 RX ADMIN — METOPROLOL TARTRATE 25 MG: 25 TABLET, FILM COATED ORAL at 10:14

## 2020-01-03 RX ADMIN — LABETALOL HYDROCHLORIDE 10 MG: 5 INJECTION INTRAVENOUS at 18:27

## 2020-01-03 RX ADMIN — HYDRALAZINE HYDROCHLORIDE 5 MG: 20 INJECTION INTRAMUSCULAR; INTRAVENOUS at 15:37

## 2020-01-03 RX ADMIN — DEXAMETHASONE 4 MG: 4 TABLET ORAL at 05:10

## 2020-01-03 RX ADMIN — FENTANYL CITRATE 100 MCG: 50 INJECTION, SOLUTION INTRAMUSCULAR; INTRAVENOUS at 14:25

## 2020-01-03 RX ADMIN — HYDRALAZINE HYDROCHLORIDE 10 MG: 20 INJECTION INTRAMUSCULAR; INTRAVENOUS at 22:42

## 2020-01-03 RX ADMIN — METOPROLOL TARTRATE 25 MG: 25 TABLET, FILM COATED ORAL at 21:15

## 2020-01-03 RX ADMIN — ESMOLOL HYDROCHLORIDE 20 MG: 100 INJECTION, SOLUTION INTRAVENOUS at 18:40

## 2020-01-03 RX ADMIN — SODIUM CHLORIDE: 9 INJECTION, SOLUTION INTRAVENOUS at 09:54

## 2020-01-03 RX ADMIN — MIDAZOLAM HYDROCHLORIDE 2 MG: 2 INJECTION, SOLUTION INTRAMUSCULAR; INTRAVENOUS at 13:30

## 2020-01-03 RX ADMIN — OXYCODONE HYDROCHLORIDE 5 MG: 5 TABLET ORAL at 21:23

## 2020-01-03 RX ADMIN — LORAZEPAM 1 MG: 2 INJECTION INTRAMUSCULAR; INTRAVENOUS at 11:29

## 2020-01-03 RX ADMIN — REMIFENTANIL HYDROCHLORIDE 0.05 MCG/KG/MIN: 1 INJECTION, POWDER, LYOPHILIZED, FOR SOLUTION INTRAVENOUS at 15:05

## 2020-01-03 RX ADMIN — DULOXETINE HYDROCHLORIDE 30 MG: 30 CAPSULE, DELAYED RELEASE ORAL at 10:14

## 2020-01-03 RX ADMIN — ROCURONIUM BROMIDE 50 MG: 10 INJECTION INTRAVENOUS at 14:25

## 2020-01-03 RX ADMIN — TACROLIMUS 3 MG: 1 CAPSULE ORAL at 10:15

## 2020-01-03 RX ADMIN — PANTOPRAZOLE SODIUM 40 MG: 40 TABLET, DELAYED RELEASE ORAL at 05:10

## 2020-01-03 RX ADMIN — SODIUM CHLORIDE: 9 INJECTION, SOLUTION INTRAVENOUS at 14:19

## 2020-01-03 RX ADMIN — ESMOLOL HYDROCHLORIDE 20 MG: 100 INJECTION, SOLUTION INTRAVENOUS at 18:32

## 2020-01-03 RX ADMIN — DOCUSATE SODIUM 100 MG: 100 CAPSULE, LIQUID FILLED ORAL at 21:16

## 2020-01-03 RX ADMIN — LABETALOL HYDROCHLORIDE 10 MG: 5 INJECTION INTRAVENOUS at 18:29

## 2020-01-03 RX ADMIN — ROCURONIUM BROMIDE 20 MG: 10 INJECTION INTRAVENOUS at 15:39

## 2020-01-03 RX ADMIN — HYDROMORPHONE HYDROCHLORIDE 0.5 MG: 1 INJECTION, SOLUTION INTRAMUSCULAR; INTRAVENOUS; SUBCUTANEOUS at 20:06

## 2020-01-03 RX ADMIN — HYDRALAZINE HYDROCHLORIDE 10 MG: 20 INJECTION INTRAMUSCULAR; INTRAVENOUS at 19:19

## 2020-01-03 RX ADMIN — DEXAMETHASONE SODIUM PHOSPHATE 10 MG: 10 INJECTION INTRAMUSCULAR; INTRAVENOUS at 14:25

## 2020-01-03 RX ADMIN — TACROLIMUS 2 MG: 1 CAPSULE ORAL at 21:16

## 2020-01-03 RX ADMIN — ROCURONIUM BROMIDE 10 MG: 10 INJECTION INTRAVENOUS at 16:54

## 2020-01-03 RX ADMIN — PREDNISONE 5 MG: 2.5 TABLET ORAL at 21:15

## 2020-01-03 RX ADMIN — SULFAMETHOXAZOLE AND TRIMETHOPRIM 1 TABLET: 400; 80 TABLET ORAL at 10:14

## 2020-01-03 RX ADMIN — SODIUM CHLORIDE: 9 INJECTION, SOLUTION INTRAVENOUS at 19:33

## 2020-01-03 RX ADMIN — SUGAMMADEX 200 MG: 100 INJECTION, SOLUTION INTRAVENOUS at 18:47

## 2020-01-03 RX ADMIN — ROCURONIUM BROMIDE 20 MG: 10 INJECTION INTRAVENOUS at 15:16

## 2020-01-03 RX ADMIN — LIDOCAINE HYDROCHLORIDE 100 MG: 20 INJECTION, SOLUTION INTRAVENOUS at 14:25

## 2020-01-03 RX ADMIN — LEVETIRACETAM 750 MG: 500 TABLET, FILM COATED ORAL at 10:14

## 2020-01-03 RX ADMIN — PROPOFOL 50 MG: 10 INJECTION, EMULSION INTRAVENOUS at 15:18

## 2020-01-03 RX ADMIN — Medication 10 ML: at 10:15

## 2020-01-03 RX ADMIN — CEFAZOLIN SODIUM 2 G: 2 SOLUTION INTRAVENOUS at 14:19

## 2020-01-03 RX ADMIN — MYCOPHENOLATE MOFETIL 500 MG: 250 CAPSULE ORAL at 21:16

## 2020-01-03 RX ADMIN — CEFAZOLIN SODIUM 2 G: 2 SOLUTION INTRAVENOUS at 21:15

## 2020-01-03 ASSESSMENT — PULMONARY FUNCTION TESTS
PIF_VALUE: 19
PIF_VALUE: 18
PIF_VALUE: 22
PIF_VALUE: 19
PIF_VALUE: 20
PIF_VALUE: 18
PIF_VALUE: 24
PIF_VALUE: 19
PIF_VALUE: 19
PIF_VALUE: 18
PIF_VALUE: 20
PIF_VALUE: 23
PIF_VALUE: 18
PIF_VALUE: 20
PIF_VALUE: 19
PIF_VALUE: 19
PIF_VALUE: 18
PIF_VALUE: 18
PIF_VALUE: 19
PIF_VALUE: 18
PIF_VALUE: 19
PIF_VALUE: 18
PIF_VALUE: 21
PIF_VALUE: 20
PIF_VALUE: 3
PIF_VALUE: 20
PIF_VALUE: 19
PIF_VALUE: 21
PIF_VALUE: 19
PIF_VALUE: 22
PIF_VALUE: 22
PIF_VALUE: 19
PIF_VALUE: 1
PIF_VALUE: 18
PIF_VALUE: 20
PIF_VALUE: 1
PIF_VALUE: 19
PIF_VALUE: 18
PIF_VALUE: 19
PIF_VALUE: 18
PIF_VALUE: 1
PIF_VALUE: 18
PIF_VALUE: 18
PIF_VALUE: 19
PIF_VALUE: 19
PIF_VALUE: 24
PIF_VALUE: 19
PIF_VALUE: 18
PIF_VALUE: 19
PIF_VALUE: 18
PIF_VALUE: 20
PIF_VALUE: 1
PIF_VALUE: 18
PIF_VALUE: 19
PIF_VALUE: 20
PIF_VALUE: 18
PIF_VALUE: 19
PIF_VALUE: 22
PIF_VALUE: 18
PIF_VALUE: 19
PIF_VALUE: 18
PIF_VALUE: 18
PIF_VALUE: 21
PIF_VALUE: 20
PIF_VALUE: 18
PIF_VALUE: 19
PIF_VALUE: 19
PIF_VALUE: 18
PIF_VALUE: 18
PIF_VALUE: 21
PIF_VALUE: 19
PIF_VALUE: 18
PIF_VALUE: 19
PIF_VALUE: 18
PIF_VALUE: 19
PIF_VALUE: 1
PIF_VALUE: 19
PIF_VALUE: 19
PIF_VALUE: 20
PIF_VALUE: 18
PIF_VALUE: 18
PIF_VALUE: 3
PIF_VALUE: 18
PIF_VALUE: 19
PIF_VALUE: 18
PIF_VALUE: 19
PIF_VALUE: 22
PIF_VALUE: 18
PIF_VALUE: 18
PIF_VALUE: 19
PIF_VALUE: 18
PIF_VALUE: 19
PIF_VALUE: 19
PIF_VALUE: 18
PIF_VALUE: 2
PIF_VALUE: 20
PIF_VALUE: 18
PIF_VALUE: 19
PIF_VALUE: 22
PIF_VALUE: 20
PIF_VALUE: 18
PIF_VALUE: 19
PIF_VALUE: 18
PIF_VALUE: 1
PIF_VALUE: 18
PIF_VALUE: 19
PIF_VALUE: 18
PIF_VALUE: 21
PIF_VALUE: 18
PIF_VALUE: 19
PIF_VALUE: 18
PIF_VALUE: 2
PIF_VALUE: 18
PIF_VALUE: 18
PIF_VALUE: 1
PIF_VALUE: 18
PIF_VALUE: 24
PIF_VALUE: 18
PIF_VALUE: 19
PIF_VALUE: 21
PIF_VALUE: 18
PIF_VALUE: 19
PIF_VALUE: 1
PIF_VALUE: 19
PIF_VALUE: 18
PIF_VALUE: 19
PIF_VALUE: 18
PIF_VALUE: 19
PIF_VALUE: 19
PIF_VALUE: 18
PIF_VALUE: 18
PIF_VALUE: 19
PIF_VALUE: 22
PIF_VALUE: 18
PIF_VALUE: 19
PIF_VALUE: 18
PIF_VALUE: 23
PIF_VALUE: 18
PIF_VALUE: 19
PIF_VALUE: 19
PIF_VALUE: 21
PIF_VALUE: 19
PIF_VALUE: 19
PIF_VALUE: 4
PIF_VALUE: 23
PIF_VALUE: 18
PIF_VALUE: 1
PIF_VALUE: 3
PIF_VALUE: 18
PIF_VALUE: 19
PIF_VALUE: 1
PIF_VALUE: 19
PIF_VALUE: 19
PIF_VALUE: 21
PIF_VALUE: 19
PIF_VALUE: 19
PIF_VALUE: 20
PIF_VALUE: 18
PIF_VALUE: 18
PIF_VALUE: 19
PIF_VALUE: 18
PIF_VALUE: 2
PIF_VALUE: 19
PIF_VALUE: 19
PIF_VALUE: 18
PIF_VALUE: 18
PIF_VALUE: 19
PIF_VALUE: 20
PIF_VALUE: 19
PIF_VALUE: 20
PIF_VALUE: 19
PIF_VALUE: 18
PIF_VALUE: 1
PIF_VALUE: 18
PIF_VALUE: 18
PIF_VALUE: 21
PIF_VALUE: 19
PIF_VALUE: 19
PIF_VALUE: 1
PIF_VALUE: 22
PIF_VALUE: 18
PIF_VALUE: 21
PIF_VALUE: 19
PIF_VALUE: 20
PIF_VALUE: 18
PIF_VALUE: 17
PIF_VALUE: 20
PIF_VALUE: 18
PIF_VALUE: 18
PIF_VALUE: 19
PIF_VALUE: 20
PIF_VALUE: 18
PIF_VALUE: 1
PIF_VALUE: 19
PIF_VALUE: 19
PIF_VALUE: 18
PIF_VALUE: 19
PIF_VALUE: 18
PIF_VALUE: 20
PIF_VALUE: 19
PIF_VALUE: 19
PIF_VALUE: 17
PIF_VALUE: 20
PIF_VALUE: 19
PIF_VALUE: 18
PIF_VALUE: 19
PIF_VALUE: 21
PIF_VALUE: 21
PIF_VALUE: 18
PIF_VALUE: 20
PIF_VALUE: 19
PIF_VALUE: 18
PIF_VALUE: 20
PIF_VALUE: 20
PIF_VALUE: 18
PIF_VALUE: 4
PIF_VALUE: 19
PIF_VALUE: 20
PIF_VALUE: 24
PIF_VALUE: 1
PIF_VALUE: 35
PIF_VALUE: 3
PIF_VALUE: 1
PIF_VALUE: 20
PIF_VALUE: 21
PIF_VALUE: 21
PIF_VALUE: 19
PIF_VALUE: 20
PIF_VALUE: 21
PIF_VALUE: 18
PIF_VALUE: 24
PIF_VALUE: 19
PIF_VALUE: 18
PIF_VALUE: 19
PIF_VALUE: 21
PIF_VALUE: 21

## 2020-01-03 ASSESSMENT — PAIN SCALES - GENERAL
PAINLEVEL_OUTOF10: 0
PAINLEVEL_OUTOF10: 0
PAINLEVEL_OUTOF10: 3
PAINLEVEL_OUTOF10: 0
PAINLEVEL_OUTOF10: 6
PAINLEVEL_OUTOF10: 9

## 2020-01-03 ASSESSMENT — ENCOUNTER SYMPTOMS
WHEEZING: 0
COUGH: 0
VOMITING: 0
DIARRHEA: 1
CHEST TIGHTNESS: 0
TROUBLE SWALLOWING: 0
SHORTNESS OF BREATH: 0
CONSTIPATION: 0
DIARRHEA: 0
NAUSEA: 0
COLOR CHANGE: 0

## 2020-01-03 ASSESSMENT — PAIN DESCRIPTION - ORIENTATION
ORIENTATION: LEFT
ORIENTATION: LEFT

## 2020-01-03 ASSESSMENT — PAIN DESCRIPTION - PAIN TYPE
TYPE: ACUTE PAIN;SURGICAL PAIN
TYPE: ACUTE PAIN;SURGICAL PAIN

## 2020-01-03 ASSESSMENT — PAIN DESCRIPTION - LOCATION
LOCATION: HEAD
LOCATION: HEAD

## 2020-01-03 ASSESSMENT — PAIN DESCRIPTION - DESCRIPTORS
DESCRIPTORS: ACHING
DESCRIPTORS: ACHING

## 2020-01-03 NOTE — PROGRESS NOTES
Physical Therapy   Facility/Department: [facility] MED SURG [id number]    NAME: Lance Mccartney    : 1945 (69 y.o.)  MRN: [id number]    Account: [de-identified]  Gender: female    PT evaluation and treatment orders received. Chart reviewed. PT eval and treat orders cancelled. Per protocol, new PT eval and treat orders are required post surgically. Sticky note written to physicians.     Electronically signed by Manuela Goss PT on 1/3/20 at 1:08 PM

## 2020-01-03 NOTE — ANESTHESIA PROCEDURE NOTES
Arterial Line:    An arterial line was placed using surface landmarks, in the holding area for the following indication(s): continuous blood pressure monitoring. A 20 gauge (size), 1 and 3/4 inch (length), Arrow (type) catheter was placed, Seldinger technique used, into the right radial artery, secured by tape and Tegaderm. Anesthesia type: Local  Local infiltration: Injection    Events:  patient tolerated procedure well with no complications.   1/3/2020 1:30 PM1/3/2020 1:35 PM  Anesthesiologist: Mili Serrano MD  Performed: Anesthesiologist   Preanesthetic Checklist  Completed: patient identified, site marked, surgical consent, pre-op evaluation, timeout performed, IV checked, risks and benefits discussed, monitors and equipment checked, anesthesia consent given, oxygen available and patient being monitored

## 2020-01-03 NOTE — ANESTHESIA PRE PROCEDURE
Genesis Frias MD   750 mg at 01/03/20 1014    dexamethasone (DECADRON) tablet 4 mg  4 mg Oral 3 times per day Bailey Gill MD   4 mg at 01/03/20 0510    pantoprazole (PROTONIX) tablet 40 mg  40 mg Oral QAM AC Fadumo Garcia MD   40 mg at 01/03/20 0510    [START ON 1/6/2020] ceFAZolin (ANCEF) 2 g in dextrose 3 % 50 mL IVPB (duplex)  2 g Intravenous Once Minna Padilla MD        sodium chloride flush 0.9 % injection 10 mL  10 mL Intravenous BID Derrick Patel MD   10 mL at 01/02/20 2306    sodium chloride flush 0.9 % injection 10 mL  10 mL Intravenous 2 times per day RAFAL Cota   10 mL at 01/03/20 1015    sodium chloride flush 0.9 % injection 10 mL  10 mL Intravenous PRN RAFAL Briggs        magnesium hydroxide (MILK OF MAGNESIA) 400 MG/5ML suspension 30 mL  30 mL Oral Daily PRN RAFAL Briggs        ondansetron [facility]) injection 4 mg  4 mg Intravenous Q6H PRN RAFAL Briggs        enoxaparin (LOVENOX) injection 40 mg  40 mg Subcutaneous Daily RAFAL Briggs   40 mg at 01/02/20 0850    LORazepam (ATIVAN) injection 2 mg  2 mg Intravenous Q6H PRN RAFAL Briggs        sulfamethoxazole-trimethoprim (BACTRIM;SEPTRA) 400-80 MG per tablet 1 tablet  1 tablet Oral Daily Fadumo Garcia MD   1 tablet at 01/03/20 1014    metoprolol tartrate (LOPRESSOR) tablet 25 mg  25 mg Oral BID Fadumo Garcia MD   25 mg at 01/03/20 1014    mycophenolate (CELLCEPT) capsule 500 mg  500 mg Oral BID Fadumo Garcia MD   500 mg at 01/03/20 1014    DULoxetine (CYMBALTA) extended release capsule 30 mg  30 mg Oral Daily Fadumo Garcia MD   30 mg at 01/03/20 1014    cinacalcet (SENSIPAR) tablet 60 mg  60 mg Oral Daily Fadumo Garcia MD   60 mg at 01/02/20 0850    tacrolimus (PROGRAF) capsule 3 mg  3 mg Oral QAM Fadumo Garcia MD   3 mg at 01/03/20 1015    tacrolimus (PROGRAF) capsule 2 mg  2 mg Oral QPM Fadumo Garcia MD   2 mg at 01/02/20 6590    hydrALAZINE (APRESOLINE) injection 10 mg  10 mg Intravenous Q6H PRN Kari Villar MD   10 mg at 01/01/20 1613       Allergies:     Allergies   Allergen Reactions    Benadryl [Diphenhydramine]      Unknown      Chlorhexidine      unknown       Problem List:    Patient Active Problem List   Diagnosis Code    End stage kidney disease (HCC) N18.6    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    Essential hypertension I10    Acid reflux K21.9    Gout M10.9    Angina, class IV (HCC) I20.9    Mixed dyslipidemia E78.2    Elevated blood uric acid level E79.0    Brain neoplasm (HCC) D49.6    Cerebral malignant neoplasm (HCC) C71.0       Past Medical History:        Diagnosis Date    Anxiety     Cardiomegaly     Chronic kidney disease     Colon polyps     Depression     Gallbladder polyp     GERD (gastroesophageal reflux disease)     Gout     Gout     Hypertension     Hyperuricemia     Hypothyroid     Vitamin D deficiency        Past Surgical History:        Procedure Laterality Date    DIALYSIS FISTULA CREATION Left     KIDNEY TRANSPLANT  03/03/2018       Social History:    Social History     Tobacco Use    Smoking status: Never Smoker    Smokeless tobacco: Never Used   Substance Use Topics    Alcohol use: Never     Frequency: Never                                Counseling given: Not Answered      Vital Signs (Current):   Vitals:    01/03/20 0812 01/03/20 0813 01/03/20 0815 01/03/20 1251   BP: (!) 170/58  (!) 170/58 (!) 183/84   Pulse: 58  61 61   Resp: 20 20  20   Temp: 97.3 °F (36.3 °C)   99.1 °F (37.3 °C)   TempSrc: Oral   Temporal   SpO2: 97%  96% 98%   Weight:       Height:                                                  BP Readings from Last 3 Encounters:   01/03/20 (!) 183/84   09/26/17 (!) 152/75   05/09/17 (!) 142/66       NPO Status: Time of last liquid consumption: 0000                        Time of last solid consumption: 0000                        Date of last liquid consumption: 01/03/20

## 2020-01-03 NOTE — PROGRESS NOTES
Constitutional:       General: She is not in acute distress. Appearance: She is not diaphoretic. HENT:      Head: Normocephalic and atraumatic. Eyes:      Extraocular Movements: Extraocular movements intact. Pupils: Pupils are equal, round, and reactive to light. Cardiovascular:      Rate and Rhythm: Normal rate and regular rhythm. Pulmonary:      Effort: Pulmonary effort is normal. No respiratory distress. Breath sounds: Normal breath sounds. Abdominal:      General: Bowel sounds are normal. There is no distension. Palpations: Abdomen is soft. Tenderness: There is no tenderness. Skin:     General: Skin is warm and dry. Neurological:      Mental Status: She is alert and oriented to person, place, and time. Cranial Nerves: No cranial nerve deficit.        Mental Status Exam:             Level of Alertness:   awake            Orientation:   person, place, time            Memory:   normal            Attention/Concentration:  normal            Language:  Expressive aphasia    Cranial Nerves         Cranial nerve III           Pupils:  equal, round, reactive to light      Cranial nerves III, IV, VI           Extraocular Movements: intact      Cranial nerve V           Facial sensation:  intact      Cranial nerve VII           Facial strength: intact      Cranial nerve VIII           Hearing:  intact      Cranial nerve IX           Palate:  intact      Cranial nerve XI         Shoulder shrug:  intact      Cranial nerve XII          Tongue movement:  normal    Motor:    Drift:  absent  Motor exam is symmetrical 5 out of 5 all extremities bilaterally  Tone:  normal  Abnormal Movements:  absent            Medications:  Reviewed    Infusion Medications:    sodium chloride 75 mL/hr at 01/03/20 0954     Scheduled Medications:    levETIRAcetam  750 mg Oral BID    dexamethasone  4 mg Oral 3 times per day    pantoprazole  40 mg Oral QAM AC    [START ON 1/6/2020] ceFAZolin  2 g Intravenous Once    sodium chloride flush  10 mL Intravenous BID    sodium chloride flush  10 mL Intravenous 2 times per day    enoxaparin  40 mg Subcutaneous Daily    sulfamethoxazole-trimethoprim  1 tablet Oral Daily    metoprolol tartrate  25 mg Oral BID    mycophenolate  500 mg Oral BID    DULoxetine  30 mg Oral Daily    cinacalcet  60 mg Oral Daily    tacrolimus  3 mg Oral QAM    tacrolimus  2 mg Oral QPM     PRN Meds: sodium chloride flush, magnesium hydroxide, ondansetron, LORazepam, hydrALAZINE    Labs:   Recent Labs     20  0845 20  0508 20  0459   WBC 3.4* 5.0 7.3   HGB 13.4 12.0 11.7*   HCT 41.3 36.8* 35.7*    154 150     Recent Labs     20  0845 20  0508 20  0459    143 143   K 4.1 4.6  4.6 4.7    109* 107   CO2 21 22 22   BUN 23 26* 30*   CREATININE 1.30* 1.09* 1.13*   CALCIUM 9.4 9.4 9.0     Recent Labs     20  0845   AST 21   ALT 8   BILITOT 0.5   ALKPHOS 100     No results for input(s): INR in the last 72 hours. Recent Labs     20  0845   TROPONINI <0.010       Urinalysis:   Lab Results   Component Value Date    NITRU Negative 2020    BLOODU Negative 2020    SPECGRAV 1.014 2020    GLUCOSEU Negative 2020       Radiology:   Most recent    EEG No procedure found. MRI of Brain   Results for orders placed during the hospital encounter of 20   MRI Brain W WO Contrast    Narrative Patient MRN: [id number]    : 1945    Age:  69 years    Gender: Female    Order Date: 2020 9:45 AM.     Exam: MRI BRAIN W WO CONTRAST    Number of Views: 0693     Indication:  Stroke    Comparison: Brain MRI 2014. Head CT 2020. Contrast dosage: MultiHance, 7 mL, IV    Findings:   No evident restricted diffusion or acute/subacute cerebrovascular infarction/accident definitively identified. Pituitary gland and sellar/suprasellar regions are unremarkable. No Chiari malformation. . Hippocampal formations are symmetric in signal intensity and morphology bilaterally given the mass effect on the left compared with the normal-appearing right. No   intrinsic noncontrast T1 shortening. T1 hypointense edema throughout the visualized left frontal, parietal, temporal lobe and basal ganglia. Abnormal T2 FLAIR hyperintensity throughout the left frontal, parietal and temporal lobe cortical and subcortical white matter and also extending in the left basal ganglia. Compression of the left lateral ventricular system. Approximately 0.56 cm left   right subfalcine herniation. There is effacement of the left ambient cistern and the basilar cistern region. No tonsillar herniation. There is a dominant left vertebral artery, otherwise, normal expected appearance of visualized T2 flow voids. Optic globes and orbital contents are within normal limits. Visualized paranasal sinuses and mastoid air cells are free from fluid or mass. . No   intraparenchymal hemorrhage. Pulsation artifact limits evaluation of the postcontrast exam and the cerebellum. Focal large enhancing mass in the left middle cranial fossa cyst left lung/left anterior temporal lobe, measuring approximately 2.3 cm anterior posterior by 2.1 cm   transverse by 1.5 cm craniocaudal.    No dural venous sinus thrombosis or occlusion is identified. No other enhancing mass is seen. Impression 1. Enhancing mass/malignancy in the left middle cranial fossa/left anterior temporal lobe, findings suspicious for meningioma although glioblastoma is not excluded. There is extensive edema extending throughout the left frontal lobe, left parietal lobe   and left basal ganglia/temporal lobe, without evidence of restricted diffusion, greater than expected. Findings could reflect extensive edema from the mass/malignancy. Other etiologies are not excluded. . Neurosurgical/neurological evaluation recommended. 2. Left to right subfalcine herniation of 0.56 cm. duplex: No results found for this or any previous visit. No results found for this or any previous visit. No results found for this or any previous visit. Echo No results found for this or any previous visit. Assessment/Plan:  Left cerebral tumor with cerebral edema  Expressive aphasia  Hx kidney transplant on immunosuppressive drugs  Decadron  Keppra for seizure prophylaxis  NSGY consulted  Left temporal craniotomy, date of surgery pending insurance approval    Collaborating physicians: Dr Adria Cabrera MD, Thomas Matos, American Board of Psychiatry & Neurology  Board Certified in Vascular Neurology  Board Certified in Neuromuscular Medicine  Certified in Neurorehabilitation       Electronically signed by Jake Mcardle, APRN - CNP on 1/3/2020 at 10:12 AM

## 2020-01-03 NOTE — PROGRESS NOTES
Radha Marsh is a 69 y.o. female patient.  Pt was seen and evaluated, no overnight events, no new complaints    Current Facility-Administered Medications   Medication Dose Route Frequency Provider Last Rate Last Dose    0.9 % sodium chloride infusion   Intravenous Continuous Melo Wilder MD 75 mL/hr at 01/03/20 0954      ceFAZolin (ANCEF) 2 g in dextrose 3 % 50 mL IVPB (duplex)  2 g Intravenous On Call to [address] Cristina Byers MD        fentaNYL (SUBLIMAZE) injection 50 mcg  50 mcg Intravenous Q10 Min PRN Tasha Fritz MD        HYDROmorphone (DILAUDID) injection 0.5 mg  0.5 mg Intravenous Q10 Min PRN Tasha Fritz MD        HYDROcodone-acetaminophen (NORCO) 5-325 MG per tablet 1 tablet  1 tablet Oral PRN Tasha Fritz MD        Or    HYDROcodone-acetaminophen (NORCO) 5-325 MG per tablet 2 tablet  2 tablet Oral PRN Tasha Fritz MD        [facility]) [facility] 4 mg  4 mg Intravenous Once PRN Tasha Fritz MD        metoclopramide (REGLAN) injection 10 mg  10 mg Intravenous Once PRN Tasha Fritz MD        meperidine (DEMEROL) injection 12.5 mg  12.5 mg Intravenous Q5 Min PRN Tasha Fritz MD        sodium chloride 0.9 % infusion             levETIRAcetam (KEPPRA) tablet 750 mg  750 mg Oral BID Beulah Schmitt MD   750 mg at 01/03/20 1014    dexamethasone (DECADRON) tablet 4 mg  4 mg Oral 3 times per day Beulah Schmitt MD   4 mg at 01/03/20 0510    pantoprazole (PROTONIX) tablet 40 mg  40 mg Oral QAM AC Melo Wilder MD   40 mg at 01/03/20 0510    sodium chloride flush 0.9 % injection 10 mL  10 mL Intravenous BID Jimmy Long MD   10 mL at 01/02/20 2306    sodium chloride flush 0.9 % injection 10 mL  10 mL Intravenous 2 times per day RAFAL Lopez   10 mL at 01/03/20 1015    sodium chloride flush 0.9 % injection 10 mL  10 mL Intravenous PRN Chantell CaRAFAL Heart        magnesium hydroxide (MILK OF MAGNESIA) 400 MG/5ML suspension 30 mL  30 mL Oral Daily PRN Rene Vaughan

## 2020-01-03 NOTE — BRIEF OP NOTE
Brief Postoperative Note  ______________________________________________________________    Patient: Jacky Watts  YOB: 1945  MRN: [id number]  Date of Procedure: 1/3/2020    Pre-Op Diagnosis: INPATIENT    Post-Op Diagnosis: Same       Procedure(s):  CRANIOTOMY FOR EXCISION OF BRAIN TUMOR    Anesthesia: General    Surgeon(s):  Shilpi Vidal MD      Estimated Blood Loss (mL): 019     Complications: None    Specimens:   ID Type Source Tests Collected by Time Destination   A : LEFT TEMPORAL MENINGIOMA Tissue Brain SURGICAL PATHOLOGY Shilpi Vidal MD 1/3/2020 1705    B : LEFT TEMPORAL MENINGIOMA Tissue Brain SURGICAL PATHOLOGY Shilpi Vidal MD 1/3/2020 1712        Implants:  Implant Name Type Inv.  Item Serial No.  Lot No. LRB No. Used   DURAGEN DURAL GRAFT MATRIX   [id number]  [id number] N/A 1         Drains:   Urethral Catheter Temperature probe 16 fr (Active)       Findings: benign appearing tumor left anterior middle fossa    Medina Nelson MD  Date: 1/3/2020  Time: 6:57 PM

## 2020-01-03 NOTE — PROGRESS NOTES
Physical Therapy   Facility/Department: [facility] MED SURG [id number]/[id number]    NAME: Naida Marcelino    : 1945 (69 y.o.)  MRN: [id number]    Account: [de-identified]  Gender: female    PT evaluation and treatment orders received. Chart reviewed. PT eval attempted. Hold PT eval:  Per Dr. Zahira Jin, do not need to do eval at this time. Pt has been indep in room. Will await until after neurosx to evaluate mobility.         Electronically signed by Rose Amezcua PT on 1/3/20 at 10:49 AM

## 2020-01-03 NOTE — FLOWSHEET NOTE
0800- In bed resting, no complaints offered. Patients daughter at bedside patients speech is clear, she can speak some [place] but mainly speaks [place]. No neuro deficits noted at this time. 6441- Arrangements are being made with insurance, for surgery. 1100- To MRI was medicated with ativan. 1210- Returned from MRI report called to holding room. Patients daughter Zabrina Mendoza is here did sign operative consent. 1235- To surgery via bed.

## 2020-01-03 NOTE — PROGRESS NOTES
Patient ID:  Radha Marsh  [id number]  69 y.o.  1945  BOVIE PAD SITE CLEAR AND INTACT PRE AND POST OP. TAKEN TO ICU16,   ATTACHED TO MONITOR AND REPORT GIVEN TO RN.   VSS DRSG DRY AND INTACT        Electronically signed by Perez Marlow RN on 1/3/2020

## 2020-01-04 LAB
ANION GAP SERPL CALCULATED.3IONS-SCNC: 14 MEQ/L (ref 9–15)
ANISOCYTOSIS: ABNORMAL
BANDED NEUTROPHILS RELATIVE PERCENT: 3 % (ref 5–11)
BASOPHILS ABSOLUTE: 0 K/UL (ref 0–0.2)
BASOPHILS RELATIVE PERCENT: 0.5 %
BLOOD BANK DISPENSE STATUS: NORMAL
BLOOD BANK DISPENSE STATUS: NORMAL
BLOOD BANK PRODUCT CODE: NORMAL
BLOOD BANK PRODUCT CODE: NORMAL
BPU ID: NORMAL
BPU ID: NORMAL
BUN BLDV-MCNC: 28 MG/DL (ref 8–23)
CALCIUM SERPL-MCNC: 8.4 MG/DL (ref 8.5–9.9)
CHLORIDE BLD-SCNC: 108 MEQ/L (ref 95–107)
CO2: 20 MEQ/L (ref 20–31)
CREAT SERPL-MCNC: 1.22 MG/DL (ref 0.5–0.9)
DESCRIPTION BLOOD BANK: NORMAL
DESCRIPTION BLOOD BANK: NORMAL
DOHLE BODIES: ABNORMAL
EOSINOPHILS ABSOLUTE: 0 K/UL (ref 0–0.7)
EOSINOPHILS RELATIVE PERCENT: 0 %
GFR AFRICAN AMERICAN: 52.4
GFR NON-AFRICAN AMERICAN: 43.3
GLUCOSE BLD-MCNC: 148 MG/DL (ref 70–99)
HCT VFR BLD CALC: 32.6 % (ref 37–47)
HEMOGLOBIN: 10.5 G/DL (ref 12–16)
LACTIC ACID: 2.5 MMOL/L (ref 0.5–2.2)
LYMPHOCYTES ABSOLUTE: 0.2 K/UL (ref 1–4.8)
LYMPHOCYTES RELATIVE PERCENT: 2 %
MCH RBC QN AUTO: 29.9 PG (ref 27–31.3)
MCHC RBC AUTO-ENTMCNC: 32.2 % (ref 33–37)
MCV RBC AUTO: 92.9 FL (ref 82–100)
MONOCYTES ABSOLUTE: 0.4 K/UL (ref 0.2–0.8)
MONOCYTES RELATIVE PERCENT: 4.7 %
NEUTROPHILS ABSOLUTE: 8.1 K/UL (ref 1.4–6.5)
NEUTROPHILS RELATIVE PERCENT: 91 %
PDW BLD-RTO: 13.9 % (ref 11.5–14.5)
PLATELET # BLD: 172 K/UL (ref 130–400)
PLATELET SLIDE REVIEW: NORMAL
POTASSIUM REFLEX MAGNESIUM: 4.3 MEQ/L (ref 3.4–4.9)
RBC # BLD: 3.52 M/UL (ref 4.2–5.4)
SODIUM BLD-SCNC: 142 MEQ/L (ref 135–144)
TOXIC GRANULATION: ABNORMAL
VACUOLATED NEUTROPHILS: PRESENT
WBC # BLD: 8.6 K/UL (ref 4.8–10.8)

## 2020-01-04 PROCEDURE — 6360000002 HC RX W HCPCS: Performed by: NEUROLOGICAL SURGERY

## 2020-01-04 PROCEDURE — 99233 SBSQ HOSP IP/OBS HIGH 50: CPT | Performed by: PSYCHIATRY & NEUROLOGY

## 2020-01-04 PROCEDURE — 6370000000 HC RX 637 (ALT 250 FOR IP): Performed by: PSYCHIATRY & NEUROLOGY

## 2020-01-04 PROCEDURE — 6370000000 HC RX 637 (ALT 250 FOR IP): Performed by: NEUROLOGICAL SURGERY

## 2020-01-04 PROCEDURE — 85025 COMPLETE CBC W/AUTO DIFF WBC: CPT

## 2020-01-04 PROCEDURE — 6360000002 HC RX W HCPCS: Performed by: PSYCHIATRY & NEUROLOGY

## 2020-01-04 PROCEDURE — 2580000003 HC RX 258: Performed by: NEUROLOGICAL SURGERY

## 2020-01-04 PROCEDURE — 6360000002 HC RX W HCPCS: Performed by: FAMILY MEDICINE

## 2020-01-04 PROCEDURE — 2700000000 HC OXYGEN THERAPY PER DAY

## 2020-01-04 PROCEDURE — 2000000000 HC ICU R&B

## 2020-01-04 PROCEDURE — 6360000002 HC RX W HCPCS: Performed by: PHYSICIAN ASSISTANT

## 2020-01-04 PROCEDURE — 2500000003 HC RX 250 WO HCPCS: Performed by: FAMILY MEDICINE

## 2020-01-04 PROCEDURE — 6370000000 HC RX 637 (ALT 250 FOR IP): Performed by: INTERNAL MEDICINE

## 2020-01-04 PROCEDURE — 83605 ASSAY OF LACTIC ACID: CPT

## 2020-01-04 PROCEDURE — 6360000002 HC RX W HCPCS: Performed by: INTERNAL MEDICINE

## 2020-01-04 PROCEDURE — 80048 BASIC METABOLIC PNL TOTAL CA: CPT

## 2020-01-04 RX ORDER — TRAMADOL HYDROCHLORIDE 50 MG/1
50 TABLET ORAL EVERY 6 HOURS PRN
Status: DISCONTINUED | OUTPATIENT
Start: 2020-01-04 | End: 2020-01-09 | Stop reason: HOSPADM

## 2020-01-04 RX ORDER — CEFAZOLIN SODIUM 2 G/50ML
2 SOLUTION INTRAVENOUS ONCE
Status: DISCONTINUED | OUTPATIENT
Start: 2020-01-04 | End: 2020-01-04

## 2020-01-04 RX ADMIN — MYCOPHENOLATE MOFETIL 500 MG: 250 CAPSULE ORAL at 09:39

## 2020-01-04 RX ADMIN — PREDNISONE 5 MG: 2.5 TABLET ORAL at 09:37

## 2020-01-04 RX ADMIN — DEXAMETHASONE 4 MG: 4 TABLET ORAL at 05:52

## 2020-01-04 RX ADMIN — DEXAMETHASONE 4 MG: 4 TABLET ORAL at 15:34

## 2020-01-04 RX ADMIN — LEVETIRACETAM 750 MG: 500 TABLET, FILM COATED ORAL at 20:26

## 2020-01-04 RX ADMIN — HYDRALAZINE HYDROCHLORIDE 10 MG: 20 INJECTION INTRAMUSCULAR; INTRAVENOUS at 15:35

## 2020-01-04 RX ADMIN — ENOXAPARIN SODIUM 40 MG: 40 INJECTION SUBCUTANEOUS at 09:38

## 2020-01-04 RX ADMIN — HYDRALAZINE HYDROCHLORIDE 10 MG: 20 INJECTION INTRAMUSCULAR; INTRAVENOUS at 20:27

## 2020-01-04 RX ADMIN — HYDROMORPHONE HYDROCHLORIDE 0.5 MG: 1 INJECTION, SOLUTION INTRAMUSCULAR; INTRAVENOUS; SUBCUTANEOUS at 06:44

## 2020-01-04 RX ADMIN — PANTOPRAZOLE SODIUM 40 MG: 40 TABLET, DELAYED RELEASE ORAL at 05:53

## 2020-01-04 RX ADMIN — SULFAMETHOXAZOLE AND TRIMETHOPRIM 1 TABLET: 400; 80 TABLET ORAL at 09:37

## 2020-01-04 RX ADMIN — METOPROLOL TARTRATE 25 MG: 25 TABLET, FILM COATED ORAL at 20:24

## 2020-01-04 RX ADMIN — ONDANSETRON 4 MG: 2 INJECTION INTRAMUSCULAR; INTRAVENOUS at 21:37

## 2020-01-04 RX ADMIN — TACROLIMUS 3 MG: 1 CAPSULE ORAL at 09:39

## 2020-01-04 RX ADMIN — HYDRALAZINE HYDROCHLORIDE 10 MG: 20 INJECTION INTRAMUSCULAR; INTRAVENOUS at 04:21

## 2020-01-04 RX ADMIN — Medication 10 ML: at 20:39

## 2020-01-04 RX ADMIN — OXYCODONE HYDROCHLORIDE 5 MG: 5 TABLET ORAL at 05:52

## 2020-01-04 RX ADMIN — CINACALCET HYDROCHLORIDE 60 MG: 30 TABLET, FILM COATED ORAL at 09:36

## 2020-01-04 RX ADMIN — TACROLIMUS 2 MG: 1 CAPSULE ORAL at 19:07

## 2020-01-04 RX ADMIN — ONDANSETRON 4 MG: 2 INJECTION INTRAMUSCULAR; INTRAVENOUS at 20:27

## 2020-01-04 RX ADMIN — METOPROLOL TARTRATE 25 MG: 25 TABLET, FILM COATED ORAL at 09:38

## 2020-01-04 RX ADMIN — OXYCODONE HYDROCHLORIDE 5 MG: 5 TABLET ORAL at 01:32

## 2020-01-04 RX ADMIN — DEXAMETHASONE 4 MG: 4 TABLET ORAL at 20:24

## 2020-01-04 RX ADMIN — SODIUM CHLORIDE: 9 INJECTION, SOLUTION INTRAVENOUS at 05:53

## 2020-01-04 RX ADMIN — TRAMADOL HYDROCHLORIDE 50 MG: 50 TABLET, FILM COATED ORAL at 20:24

## 2020-01-04 RX ADMIN — HYDROMORPHONE HYDROCHLORIDE 0.25 MG: 1 INJECTION, SOLUTION INTRAMUSCULAR; INTRAVENOUS; SUBCUTANEOUS at 13:32

## 2020-01-04 RX ADMIN — CEFAZOLIN SODIUM 2 G: 2 SOLUTION INTRAVENOUS at 04:06

## 2020-01-04 RX ADMIN — TRAMADOL HYDROCHLORIDE 50 MG: 50 TABLET, FILM COATED ORAL at 11:52

## 2020-01-04 RX ADMIN — DOCUSATE SODIUM 100 MG: 100 CAPSULE, LIQUID FILLED ORAL at 09:37

## 2020-01-04 RX ADMIN — DULOXETINE HYDROCHLORIDE 30 MG: 30 CAPSULE, DELAYED RELEASE ORAL at 09:37

## 2020-01-04 RX ADMIN — HYDROMORPHONE HYDROCHLORIDE 0.5 MG: 1 INJECTION, SOLUTION INTRAMUSCULAR; INTRAVENOUS; SUBCUTANEOUS at 00:07

## 2020-01-04 RX ADMIN — DOCUSATE SODIUM 100 MG: 100 CAPSULE, LIQUID FILLED ORAL at 20:24

## 2020-01-04 RX ADMIN — LEVETIRACETAM 750 MG: 500 TABLET, FILM COATED ORAL at 09:36

## 2020-01-04 RX ADMIN — LABETALOL 20 MG/4 ML (5 MG/ML) INTRAVENOUS SYRINGE 10 MG: at 00:00

## 2020-01-04 ASSESSMENT — ENCOUNTER SYMPTOMS
DIARRHEA: 0
NAUSEA: 0
COUGH: 0
SHORTNESS OF BREATH: 0
VOMITING: 0

## 2020-01-04 ASSESSMENT — PAIN DESCRIPTION - LOCATION: LOCATION: HEAD

## 2020-01-04 ASSESSMENT — PAIN SCALES - GENERAL
PAINLEVEL_OUTOF10: 10
PAINLEVEL_OUTOF10: 0
PAINLEVEL_OUTOF10: 0
PAINLEVEL_OUTOF10: 10
PAINLEVEL_OUTOF10: 7
PAINLEVEL_OUTOF10: 6
PAINLEVEL_OUTOF10: 7
PAINLEVEL_OUTOF10: 0
PAINLEVEL_OUTOF10: 5
PAINLEVEL_OUTOF10: 0

## 2020-01-04 ASSESSMENT — PAIN DESCRIPTION - ORIENTATION: ORIENTATION: LEFT

## 2020-01-04 ASSESSMENT — PAIN DESCRIPTION - PAIN TYPE: TYPE: ACUTE PAIN;SURGICAL PAIN

## 2020-01-04 ASSESSMENT — PAIN DESCRIPTION - DESCRIPTORS: DESCRIPTORS: ACHING

## 2020-01-04 NOTE — FLOWSHEET NOTE
26- Received OR report from [employer] via telephone call. Patient should be on their way in 5 minutes. Patient extubated with art line. 1905- Received patient from [address] [place] [address] staff. Alexandro Aviles gave brief report. Dr Jennifer Smith stated that 2 units of packed RBCS are on hold. No order to give yet. Stockingnet on patient's head. Reduced o2 to 3 L nc. Patient doing well. Patient moving all extremities in bed. R Radial art line in place and intact. Good wave form. HTN. Good pheripheal pulses. AV fistula on Left Arm protruding but intact. Positive bruit/thrill. Patient moaning and nods to questions, but does not respond verbally. Knows herself. Pupils equal and reactive. Corneal reflex present. Not following commands yet. BS active. Skin intact. Mepilex put on coccyx. SCDs on. Saline hung. No fever. 0- Patient recovery completed patient doing well. No concerns. Neuro assessment unchanged. Dr Magdaleno Brice at bedside. Patient to go for a CT of head. Dr Magdaleno Brice happy with results. Will continue to monitor. Gregg Rhodes RN taking over. No further needs.

## 2020-01-04 NOTE — PROGRESS NOTES
[facility] Occupational Therapy      Date: 2020  Patient Name: Jacky Watts        MRN: [id number]  Account: [de-identified]   : 1945  (69 y.o.)  Room: [place] [address]    Chart reviewed, attempted OT at 1:15 p.m.  for eval. Patient not seen 2° to:    Hold per nsg request. Pt with increased pain and reporting double vision. RN asked to attempt tomorrow. The patient has had a change in status and/or is not medically stable to participate. Pt also had craniotomy for excision of brain tumor on 1/3/2020 and will need new order. Thank you,    Electronically signed by SHANE Hoang on 20 at 1:27 PM    The [address] [place] [address] O.. Department.

## 2020-01-04 NOTE — OP NOTE
[address]                      [address], [address] [facility]                                OPERATIVE REPORT    PATIENT NAME: Willie Harden                   :        1945  MED REC NO:   [id number]                            ROOM:       [facility]  ACCOUNT NO:   [de-identified]                           ADMIT DATE: 2020  PROVIDER:     Goyo Hernandez MD    DATE OF PROCEDURE:  2020    PREOPERATIVE DIAGNOSIS:  Left anterior temporal intracerebral tumor. POSTOPERATIVE DIAGNOSIS:  Left anterior temporal intracerebral tumor. SURGEON:  Goyo Hernandez MD    OPERATION PERFORMED:  Left pterional craniotomy/craniectomy with total  resection of intracerebral tumor. DESCRIPTION OF PROCEDURE:  The patient was given general endotracheal  anesthesia in the supine position. Lasix IV 10 mg. Ancef IV  preoperatively. Christopher catheter established. Monitored by  Anesthesiology. The patient's head was clipped. We then placed the  facemask on her and registered with a computer for the image-guided  system (IGS). This was very helpful. The rescue points were then made  on the left side of the scalp. A 3-pin head choi was placed on the  patient and fixed to the bed with her head turned to the right side,  bringing the left zygoma uppermost.  Using the rescue points, the  patient was then re-registered to the preoperative MRI study and the IGS  system. In this way, I was able to use the tools on the surface and  outline the brain underneath with the tumor in the anterior temporal  fossa. This area was then prepped and draped. Time-out, patient  identified. Skin infiltrated with a solution of 0.5% Marcaine with  epinephrine. A curvilinear incision was made beginning at the zygoma on  the left side, staying behind the hairline in a curvilinear fashion  almost up to the midline. Dissection was carried down to the temporalis  muscle.   I did make an incision into the temporalis muscle and mobilized  the temporalis muscle anteriorly and inferiorly. I could see the  pterion. Staying over the temporal bone, I have made two small nancy  holes and a small craniotomy was performed. Working anteriorly on the  lateral aspect of the temporal bone towards the anterior temporal fossa,  craniectomy was performed. There were a couple of dural arterial  bleeders that were controlled with bipolar coagulation. The dura was  kept intact. I worked laterally and inferiorly to the floor of the  temporal fossa and anteriorly to the most anterior aspect of the  temporal lobe. I stayed inferior to sylvian fissure and did not  visualize the sylvian fissure staying in the middle and anterior gyri of  the temporal lobe. I did open the dura and hinged it anteriorly. A  cortical incision was made and just beneath the surface I did encounter  the tumor. It had the appearances of a meningioma. A biopsy was sent,  but the pathology did not confirm meningioma, but only that it was  neoplastic. However, the tumor had a very well-defined margin all the  way around it; it had no appearance whatsoever infiltrating into the  normal brain. The brain was pushed away by this growing tumor. Again,  it did appear like a meningioma. I was able to resect just very small  portion of cortex to get around the entire tumor. It was based in the  temporal fossa. 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 The tumor was infiltrating into the base of the  anterior temporal fossa. I was able to use the ultrasonic aspirator to  de-gut the tumor. In this way, I was able to roll the walls in off the  cerebral cortex preserving the cerebral cortex. The entire tumor was  removed and then I did coagulate it off the base of the skull. There  was no residual tumor visualized. The wound was thoroughly irrigated  many times.   The dura of course shrunk, so I did reapproximate the dura  as best

## 2020-01-04 NOTE — PROGRESS NOTES
Neurology Daily Progress Note  Name: Radha Marsh  Age: 69 y.o. Gender: female  CodeStatus: Full Code  Allergies: Benadryl [Diphenhydramine]  Chlorhexidine    Chief Complaint:Altered Mental Status    Primary Care Provider: Garnetta Kehr, DO  InpatientTreatment Team: Treatment Team: Attending Provider: Melo Wilder MD; Consulting Physician: Luli Mack MD; Consulting Physician: Beulah Schmitt MD; Consulting Physician: Alexey Piña MD; Surgeon: Luli Mack MD; : Cleve Contreras RN; Patient Care Tech: Placed; Patient Care Tech: Spredfast; Registered Nurse: Jessica Lacey RN  Admission Date: 1/1/2020      HPI   Pt seen and examined for neuro follow up for 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 Pt speaks mostly [place]. Daughter at bedside. Expressive aphasia somewhat improved. No other focal deficits. Denies Headache, double vision, blurry vision, difficulty with speech, difficulty with swallowing, weakness, numbness, pain, nausea, vomiting, choking, neck pain, dizziness    BP (!) 148/55   Pulse 74   Temp 97.8 °F (36.6 °C)   Resp 15   Ht 5' 1\" (1.549 m)   Wt 144 lb 11.2 oz (65.6 kg)   SpO2 96%   BMI 27.34 kg/m²                                   Review of Systems   Constitutional: Negative for appetite change, chills, fatigue and fever. HENT: Negative for hearing loss and trouble swallowing. Eyes: Negative for visual disturbance. Respiratory: Negative for cough, chest tightness, shortness of breath and wheezing. Cardiovascular: Negative for chest pain, palpitations and leg swelling. Gastrointestinal: Positive for diarrhea. Negative for constipation, nausea and vomiting. Skin: Negative for color change and rash. Neurological: Positive for speech difficulty. Negative for dizziness, tremors, seizures, syncope, facial asymmetry, weakness, light-headedness, numbness and headaches.    Psychiatric/Behavioral: Negative for agitation, confusion and dexamethasone  4 mg Oral 3 times per day    pantoprazole  40 mg Oral QAM AC    [START ON 2020] ceFAZolin  2 g Intravenous Once    sodium chloride flush  10 mL Intravenous BID    sodium chloride flush  10 mL Intravenous 2 times per day    enoxaparin  40 mg Subcutaneous Daily    sulfamethoxazole-trimethoprim  1 tablet Oral Daily    metoprolol tartrate  25 mg Oral BID    mycophenolate  500 mg Oral BID    DULoxetine  30 mg Oral Daily    cinacalcet  60 mg Oral Daily    tacrolimus  3 mg Oral QAM    tacrolimus  2 mg Oral QPM     PRN Meds: sodium chloride flush, magnesium hydroxide, ondansetron, LORazepam, hydrALAZINE    Labs:   Recent Labs     20  0845 20  0508 20  0459   WBC 3.4* 5.0 7.3   HGB 13.4 12.0 11.7*   HCT 41.3 36.8* 35.7*    154 150     Recent Labs     20  0845 20  0508 20  0459    143 143   K 4.1 4.6  4.6 4.7    109* 107   CO2 21 22 22   BUN 23 26* 30*   CREATININE 1.30* 1.09* 1.13*   CALCIUM 9.4 9.4 9.0     Recent Labs     20  0845   AST 21   ALT 8   BILITOT 0.5   ALKPHOS 100     No results for input(s): INR in the last 72 hours. Recent Labs     20  0845   TROPONINI <0.010       Urinalysis:   Lab Results   Component Value Date    NITRU Negative 2020    BLOODU Negative 2020    SPECGRAV 1.014 2020    GLUCOSEU Negative 2020       Radiology:   Most recent    EEG No procedure found. MRI of Brain   Results for orders placed during the hospital encounter of 20   MRI Brain W WO Contrast    Narrative Patient MRN: [id number]    : 1945    Age:  69 years    Gender: Female    Order Date: 2020 9:45 AM.     Exam: MRI BRAIN W WO CONTRAST    Number of Views: 3744     Indication:  Stroke    Comparison: Brain MRI 2014. Head CT 2020.     Contrast dosage: MultiHance, 7 mL, IV    Findings:   No evident restricted diffusion or acute/subacute cerebrovascular infarction/accident definitively excluded. . Neurosurgical/neurological evaluation recommended. 2. Left to right subfalcine herniation of 0.56 cm. Neurosurgical consultation and evaluation is recommended. 3. Dominant left vertebral artery         No results found for this or any previous visit. MRA of the Head and Neck: No results found for this or any previous visit. No results found for this or any previous visit. Results for orders placed during the hospital encounter of 01/01/20   MRA HEAD WO CONTRAST    Narrative EXAM: MR angiography of the head without contrast.    History: Meningioma    Technique: Multiplanar multisequence MRI of the brain was performed. 3-D time-of-flight axial images were performed through the [place]. 3-D volume rendered images of the [place] performed. Comparison: MRI of the brain from January 1, 2000    Findings: The bilateral distal cervical internal carotid arteries through the skull base are patent. The bilateral middle cerebral arteries through the trifurcation and opercular branches are patent. The vertebrobasilar system including the superior cerebellar and posterior cerebral arteries are patent. Left vertebral artery is dominant. Posterior communicating arteries are patent. The bilateral anterior cerebral arteries and anterior communicating artery are patent. No aneurysm or high-grade stenosis of the visualized cerebral vasculature. No significant interval change in edema of the left frontal, parietal, and temporal lobes. No significant interval change of 6 mm of rightward midline shift. Again identified is a 2.3 cm mass within the anterior left middle cranial fossa as detailed on   recent MRI of the brain. Impression No aneurysm or high-grade stenosis, or vascular malformation of the visualized cerebral vasculature. No significant interval change in left frontal, parietal, and temporal lobe edema and 6 mm of rightward midline shift.

## 2020-01-04 NOTE — PROGRESS NOTES
Berna Jara is a 69 y.o. female patient.  Pt was seen and evaluated, no overnight events, no new complaints    Current Facility-Administered Medications   Medication Dose Route Frequency Provider Last Rate Last Dose    traMADol (ULTRAM) tablet 50 mg  50 mg Oral Q6H PRN Lucita Goncalves MD        0.9 % sodium chloride infusion   Intravenous Continuous Lupe Wisdom MD   Stopped at 01/03/20 2120    0.9 % sodium chloride infusion 250 mL  250 mL Intravenous Once Lucita Goncalves MD   Stopped at 01/03/20 2314    predniSONE (DELTASONE) tablet 5 mg  5 mg Oral Daily Lucita Goncalves MD   5 mg at 01/04/20 0937    0.9 % sodium chloride infusion   Intravenous Continuous Lucita Goncalves MD   Stopped at 01/04/20 1008    sodium chloride flush 0.9 % injection 10 mL  10 mL Intravenous 2 times per day Lucita Goncalves MD        sodium chloride flush 0.9 % injection 10 mL  10 mL Intravenous PRN Lucita Goncalves MD        HYDROmorphone (DILAUDID) injection 0.25 mg  0.25 mg Intravenous Q3H PRN Lucita Goncalves MD        Or    HYDROmorphone (DILAUDID) injection 0.5 mg  0.5 mg Intravenous Q3H PRN Lucita Goncalves MD   0.5 mg at 01/04/20 0644    docusate sodium (COLACE) capsule 100 mg  100 mg Oral BID Lucita Goncalves MD   100 mg at 01/04/20 0937    ondansetron (ZOFRAN) injection 4 mg  4 mg Intravenous Q6H PRN Lucita Goncalves MD        labetalol (NORMODYNE;TRANDATE) injection syringe 10 mg  10 mg Intravenous Q2H PRN Gerson Last MD   10 mg at 01/04/20 0000    hydrALAZINE (APRESOLINE) injection 10 mg  10 mg Intravenous Q2H PRN Gerson Last MD   10 mg at 01/04/20 0421    levETIRAcetam (KEPPRA) tablet 750 mg  750 mg Oral BID Bryanna Avlaos MD   750 mg at 01/04/20 0936    dexamethasone (DECADRON) tablet 4 mg  4 mg Oral 3 times per day Bryanna Avalos MD   4 mg at 01/04/20 0552    pantoprazole (PROTONIX) tablet 40 mg  40 mg Oral QAM AC Lupe Wisdom MD   40 mg at 01/04/20 0553    sodium chloride flush 0.9 % injection 10 mL  10 mL Intravenous BID Heath BACK MD Bess   10 mL at 01/02/20 2306    sodium chloride flush 0.9 % injection 10 mL  10 mL Intravenous 2 times per day RAFAL Verduzco   10 mL at 01/03/20 1015    sodium chloride flush 0.9 % injection 10 mL  10 mL Intravenous PRN Valeria Verduzco        magnesium hydroxide (MILK OF MAGNESIA) 400 MG/5ML suspension 30 mL  30 mL Oral Daily PRN RAFAL Verduzco        enoxaparin (LOVENOX) injection 40 mg  40 mg Subcutaneous Daily RAFAL Verduzco   40 mg at 01/04/20 3780    LORazepam (ATIVAN) injection 2 mg  2 mg Intravenous Q6H PRN Valeria Verduzco        sulfamethoxazole-trimethoprim (BACTRIM;SEPTRA) 400-80 MG per tablet 1 tablet  1 tablet Oral Daily Poornima Morris MD   1 tablet at 01/04/20 8451    metoprolol tartrate (LOPRESSOR) tablet 25 mg  25 mg Oral BID Poornima Morris MD   25 mg at 01/04/20 7279    mycophenolate (CELLCEPT) capsule 500 mg  500 mg Oral BID Poornima Morris MD   500 mg at 01/04/20 1756    DULoxetine (CYMBALTA) extended release capsule 30 mg  30 mg Oral Daily Poornima Morris MD   30 mg at 01/04/20 5770    cinacalcet (SENSIPAR) tablet 60 mg  60 mg Oral Daily Poornima Morris MD   60 mg at 01/04/20 0936    tacrolimus (PROGRAF) capsule 3 mg  3 mg Oral QAM Poornima Morris MD   3 mg at 01/04/20 0566    tacrolimus (PROGRAF) capsule 2 mg  2 mg Oral QPM Poornima Morris MD   2 mg at 01/03/20 2116     Allergies   Allergen Reactions    Benadryl [Diphenhydramine]      Unknown      Chlorhexidine      unknown     Active Problems:    Brain neoplasm (HCC)    Generalized seizure ([address]    Aphasia  Resolved Problems:    * No resolved hospital problems. *    Blood pressure (!) 148/55, pulse 83, temperature 97.8 °F (36.6 °C), resp. rate 14, height 5' 1\" (1.549 m), weight 144 lb 11.2 oz (65.6 kg), SpO2 97 %. Subjective:  Symptoms:  She reports malaise and weakness. No shortness of breath, cough, chest pain, headache, chest pressure, anorexia, diarrhea or anxiety.     Diet: No nausea or vomiting. Objective:  General Appearance:  Well-appearing, comfortable and in no acute distress. Vital signs: (most recent): Blood pressure (!) 148/55, pulse 83, temperature 97.8 °F (36.6 °C), resp. rate 14, height 5' 1\" (1.549 m), weight 144 lb 11.2 oz (65.6 kg), SpO2 97 %. HEENT: Normal HEENT exam.    Lungs:  Normal effort. Heart: Normal rate. Regular rhythm. S1 normal.    Abdomen: Abdomen is soft. Bowel sounds are normal.   There is no epigastric area or suprapubic area tenderness. Pulses: Distal pulses are intact. Neurological: Patient is alert. Pupils:  Pupils are equal, round, and reactive to light. Skin:  Warm and dry. Lab Results   Component Value Date    WBC 8.6 01/04/2020    HGB 10.5 (L) 01/04/2020    HCT 32.6 (L) 01/04/2020    MCV 92.9 01/04/2020     01/04/2020     Lab Results   Component Value Date     01/04/2020    K 4.3 01/04/2020     01/04/2020    CO2 20 01/04/2020    BUN 28 01/04/2020    CREATININE 1.22 01/04/2020    GLUCOSE 148 01/04/2020    CALCIUM 8.4 01/04/2020      Past Medical History:   Diagnosis Date    Anxiety     Cardiomegaly     Chronic kidney disease     Colon polyps     Depression     Gallbladder polyp     GERD (gastroesophageal reflux disease)     Gout     Gout     Hypertension     Hyperuricemia     Hypothyroid     Vitamin D deficiency        Assessment & Plan  1) . Left temporal lobe malignancy with severe reactive edema. S/p resection  Tolerated the surgery well  Can be transferred to [facility] if ok with neurology  2.  3/3/2018 kidney transplant on immunosuppressive drugs. [facility] already aware, recommended to cont same meds  3) weakness pt/ot  4. Hypertension.   5.  Acid reflux  C/w PPI  FU neurology and neurosyrgery  6) left lower lung lesion  FU CT chest with IV contrast, still not reported  Vivek Gupta MD  1/4/2020

## 2020-01-04 NOTE — CONSULTS
mouth daily      tacrolimus (PROGRAF) 1 MG capsule Take 3 caps in am and 2 cap in pm, 12 hours apart. ICD. 10 = Z94.0      cinacalcet (SENSIPAR) 60 MG tablet Take 60 mg by mouth daily         Allergies:  Benadryl [diphenhydramine] and Chlorhexidine    Social History:    Social History     Socioeconomic History    Marital status:      Spouse name: Not on file    Number of children: Not on file    Years of education: Not on file    Highest education level: Not on file   Occupational History    Not on file   Social Needs    Financial resource strain: Not on file    Food insecurity:     Worry: Not on file     Inability: Not on file    Transportation needs:     Medical: Not on file     Non-medical: Not on file   Tobacco Use    Smoking status: Never Smoker    Smokeless tobacco: Never Used   Substance and Sexual Activity    Alcohol use: Never     Frequency: Never    Drug use: Never    Sexual activity: Not on file   Lifestyle    Physical activity:     Days per week: Not on file     Minutes per session: Not on file    Stress: Not on file   Relationships    Social connections:     Talks on phone: Not on file     Gets together: Not on file     Attends Che[employer] service: Not on file     Active member of club or organization: Not on file     Attends meetings of clubs or organizations: Not on file     Relationship status: Not on file    Intimate partner violence:     Fear of current or ex partner: Not on file     Emotionally abused: Not on file     Physically abused: Not on file     Forced sexual activity: Not on file   Other Topics Concern    Not on file   Social History Narrative    Not on file       Family History:   History reviewed. No pertinent family history.     Review of Systems:   Positives in bold  Constitutional: fever, chills, fatigue, malaise   HENT:  rhinorrhea, sinus pain, sore throat, epistaxis    Eyes:  photophobia, visual disturbance, eye redness  Respiratory: shortness of breath, cough, hemoptysis    Cardiovascular: chest pain, palpitations, orthopnea, leg swelling   Gastrointestinal: abdominal pain, nausea, vomiting, diarrhea, constipation   Endocrine: polydipsia, polyphagia, cold intolerance, heat intolerance  Genitourinary: dysuria, flank pain, frequency, urgency   Hematologic: easy bruising, easy bleeding  Musculoskeletal: back pain, neck pain, myalgias, arthalgias  Neurological: syncope, lightheadedness, dizziness, seizures, tremors, weakness  Psychiatric/Behavioral: anxiety, depression, hallucinations  Skin: rash, itching    Physical exam:   Constitutional:  VITALS:  BP (!) 148/55   Pulse 83   Temp 97.8 °F (36.6 °C)   Resp 14   Ht 5' 1\" (1.549 m)   Wt 144 lb 11.2 oz (65.6 kg)   SpO2 97%   BMI 27.34 kg/m²     General: alert, in no apparent distress  HEENT: normocephalic, LT craniotomy site, anicteric  Neck: supple, no mass  Lungs: non-labored respirations, clear to auscultation bilaterally  Heart: regular rate and rhythm, + systolic murmur  Abdomen: soft, non-tender, non-distended  MSK: no joint swelling or tenderness  Ext: no cyanosis, no peripheral edema  Neuro: alert, follows commands, RT sided facial droop   Psych: normal mood and affect    Data/  CBC:   Lab Results   Component Value Date    WBC 8.6 01/04/2020    RBC 3.52 01/04/2020    HGB 10.5 01/04/2020    HCT 32.6 01/04/2020    MCV 92.9 01/04/2020    MCH 29.9 01/04/2020    MCHC 32.2 01/04/2020    RDW 13.9 01/04/2020     01/04/2020    MPV 8.8 06/26/2014     BMP:    Lab Results   Component Value Date     01/04/2020    K 4.3 01/04/2020     01/04/2020    CO2 20 01/04/2020    BUN 28 01/04/2020    LABALBU 4.5 01/01/2020    CREATININE 1.22 01/04/2020    CALCIUM 8.4 01/04/2020    GFRAA 52.4 01/04/2020    LABGLOM 43.3 01/04/2020    GLUCOSE 148 01/04/2020     Ct Head Wo Contrast    Result Date: 1/4/2020  CT HEAD WO CONTRAST CLINICAL HISTORY:  Postop COMPARISON: January 1, 2020.  Clinical note: Please see MRI report Comparison: None. Findings: This examination was performed on a CT scanner with dose reduction. Technique: Low-dose CT  acquisition technique included one of following options; 1 . Automated exposure control, 2. Adjustment of MA and or KV according to patient's size or 3. Use of iterative reconstruction. Large area of hypodensity left frontal lobe white matter, left parietal lobe, left temporal lobe, and left basal ganglia. There is 0.56 cm left to right subfalcine herniation. No intraparenchymal hemorrhage definitively identified. No comparison studies. Pituitary gland and sellar/suprasellar regions are unremarkable. No Chiari malformation. . No acute fracture or lytic or blastic bony lesion. Optic globes and orbital contents unremarkable. Vascular calcifications within the bilateral internal carotid artery cavernous segments and the vertebral arteries. .     Findings communicated directly with Dr. Pattie Hansen  on 1/1/2020 9:25 AM . 1. Abnormal exam. Large area of hypodensity and edema with mass effect on left frontal, temporal and parietal lobes and left basal ganglia with left to right subfalcine herniation of 0.56 cm. Findings are concerning for potential underlying mass/malignancy with extensive edema and mass effect versus cerebrovascular infarction/accident with edema and mass effect. Further evaluation MRI of the brain with and without IV contrast is recommended. 2. Vascular calcifications within the bilateral internal carotid artery cavernous segments and the vertebral arteries. Mra Head Wo Contrast    Result Date: 1/2/2020  EXAM: MR angiography of the head without contrast. History: Meningioma Technique: Multiplanar multisequence MRI of the brain was performed. 3-D time-of-flight axial images were performed through the [place] [facility]. 3-D volume rendered images of the [place] [facility] [place] performed. Comparison: MRI of the brain from January 1, 2000 Findings:  The bilateral distal cervical internal carotid infiltrate/pneumonia and/or atelectasis. Jack Espino 4. Vascular calcifications thoracic aorta     Mri Brain W Wo Contrast    Result Date: 2020  Patient MRN: [id number] : 1945 Age:  69 years Gender: Female Order Date: 2020 9:45 AM. Exam: MRI BRAIN W WO CONTRAST Number of Views: [id number] Indication:  Stroke Comparison: Brain MRI 2014. Head CT 2020. Contrast dosage: MultiHance, 7 mL, IV Findings: No evident restricted diffusion or acute/subacute cerebrovascular infarction/accident definitively identified. Pituitary gland and sellar/suprasellar regions are unremarkable. No Chiari malformation. . Hippocampal formations are symmetric in signal intensity and morphology bilaterally given the mass effect on the left compared with the normal-appearing right. No intrinsic noncontrast T1 shortening. T1 hypointense edema throughout the visualized left frontal, parietal, temporal lobe and basal ganglia. Abnormal T2 FLAIR hyperintensity throughout the left frontal, parietal and temporal lobe cortical and subcortical white matter and also extending in the left basal ganglia. Compression of the left lateral ventricular system. Approximately 0.56 cm left right subfalcine herniation. There is effacement of the left ambient cistern and the basilar cistern region. No tonsillar herniation. There is a dominant left vertebral artery, otherwise, normal expected appearance of visualized T2 flow voids. Optic globes and orbital contents are within normal limits. Visualized paranasal sinuses and mastoid air cells are free from fluid or mass. . No intraparenchymal hemorrhage. Pulsation artifact limits evaluation of the postcontrast exam and the cerebellum. Focal large enhancing mass in the left middle cranial fossa cyst left lung/left anterior temporal lobe, measuring approximately 2.3 cm anterior posterior by 2.1 cm transverse by 1.5 cm craniocaudal. No dural venous sinus thrombosis or occlusion is identified.  No other enhancing mass is seen.     1. Enhancing mass/malignancy in the left middle cranial fossa/left anterior temporal lobe, findings suspicious for meningioma although glioblastoma is not excluded. There is extensive edema extending throughout the left frontal lobe, left parietal lobe and left basal ganglia/temporal lobe, without evidence of restricted diffusion, greater than expected. Findings could reflect extensive edema from the mass/malignancy. Other etiologies are not excluded. . Neurosurgical/neurological evaluation recommended. 2. Left to right subfalcine herniation of 0.56 cm. Neurosurgical consultation and evaluation is recommended. 3. Dominant left vertebral artery       Assessment:  69y.o. year old Brazilian speaking female with history s/f HTN, renal transplant 03/18, GERD, Gout and hypothyroidism who presented w/ concern for seizures and HA. Also had AMS. Found to have LT anterior temporal intracerebral tumor now s/p craniotomy/craniectomy w/ total tumor resection (01/03/20). Nephrology consulted for managed of immunosuppression. 1.  ESRD w/ original unknown disease s/p DDKT on 03/03/18 at [address] [place] [address], Pt of Dr. Shayna Martin, previously on IHD for 6.5 years via LUE AVF  Allograft function: has CKD stage III w/ baseline Scr 1.2-1.3 w/ eGFR low 40s, function stable   Immunosuppression: on prograf 3/2, cellcept 500/500 and prednisone 5 mg as outpatient     2. LT anterior temporal intracerebral tumor now s/p craniotomy/craniectomy w/ total tumor resection (01/03/20)  3. HTN:     Plan:  - ok to stop fluids as pt tolerating PO and hypertensive  - continue current IS regimen    Thank you for the consultation. Will continue to follow  Please do not hesitate to call with questions.     Zeina Carrasco MD

## 2020-01-04 NOTE — FLOWSHEET NOTE
No events of note during shift. Patients right droop still present but not as significant now at 6:52am as it was at 7:30pm.    Assessment per flow sheets and medications per mar.

## 2020-01-04 NOTE — PROGRESS NOTES
0800  Shift assessment done. Pt alert and oriented. Pt does not speak english fluently. Neuro check done. No neuro deficit.     0900  Pt spoke to doctor. Nephrologist saw pt. Medications given as ordered. Pt had a meal. Dressing change provided as ordered. No complaint of pain. 1000  Neuro check done. No neuro deficit. 1150  Neuro check done and no neuro deficit noted. Pt complained a pain. Ultram given as prn order. 262 Juan Manuel Friend at bedside. She stated that pt said pt stated she has  double vision. Said to me \" I see 2 ones, 2 twos on the clock. \". Spoke with Dr Dario Christianson. Bilateral eyes move together. Other neuro assessment without deficit. Continue to monitor. 1535  Pt SBP >160. Hydralazine IV given as prn order. Rechecked SBP is 140s. 1730  Christopher catheter is removed. Urine output is 850ml. Urine color clear and yellow. 1800  ART line is removed. Pt tolerated well.

## 2020-01-05 LAB
ANION GAP SERPL CALCULATED.3IONS-SCNC: 13 MEQ/L (ref 9–15)
BASOPHILS ABSOLUTE: 0 K/UL (ref 0–0.2)
BASOPHILS RELATIVE PERCENT: 0.2 %
BUN BLDV-MCNC: 25 MG/DL (ref 8–23)
CALCIUM SERPL-MCNC: 8.9 MG/DL (ref 8.5–9.9)
CHLORIDE BLD-SCNC: 106 MEQ/L (ref 95–107)
CO2: 21 MEQ/L (ref 20–31)
CREAT SERPL-MCNC: 1.12 MG/DL (ref 0.5–0.9)
EOSINOPHILS ABSOLUTE: 0 K/UL (ref 0–0.7)
EOSINOPHILS RELATIVE PERCENT: 0 %
GFR AFRICAN AMERICAN: 57.8
GFR NON-AFRICAN AMERICAN: 47.8
GLUCOSE BLD-MCNC: 127 MG/DL (ref 70–99)
HCT VFR BLD CALC: 31.7 % (ref 37–47)
HEMOGLOBIN: 10.2 G/DL (ref 12–16)
LACTIC ACID: 1.5 MMOL/L (ref 0.5–2.2)
LYMPHOCYTES ABSOLUTE: 0.3 K/UL (ref 1–4.8)
LYMPHOCYTES RELATIVE PERCENT: 4.9 %
MCH RBC QN AUTO: 29.9 PG (ref 27–31.3)
MCHC RBC AUTO-ENTMCNC: 32.1 % (ref 33–37)
MCV RBC AUTO: 93.3 FL (ref 82–100)
MONOCYTES ABSOLUTE: 0.6 K/UL (ref 0.2–0.8)
MONOCYTES RELATIVE PERCENT: 8 %
NEUTROPHILS ABSOLUTE: 6.1 K/UL (ref 1.4–6.5)
NEUTROPHILS RELATIVE PERCENT: 86.9 %
PARATHYROID HORMONE INTACT: 71.6 PG/ML (ref 15–65)
PDW BLD-RTO: 14 % (ref 11.5–14.5)
PHOSPHORUS: 2.5 MG/DL (ref 2.3–4.8)
PLATELET # BLD: 151 K/UL (ref 130–400)
POTASSIUM REFLEX MAGNESIUM: 4.4 MEQ/L (ref 3.4–4.9)
RBC # BLD: 3.4 M/UL (ref 4.2–5.4)
SODIUM BLD-SCNC: 140 MEQ/L (ref 135–144)
URIC ACID, SERUM: 6.2 MG/DL (ref 2.4–5.7)
WBC # BLD: 7 K/UL (ref 4.8–10.8)

## 2020-01-05 PROCEDURE — 84100 ASSAY OF PHOSPHORUS: CPT

## 2020-01-05 PROCEDURE — 6370000000 HC RX 637 (ALT 250 FOR IP): Performed by: NEUROLOGICAL SURGERY

## 2020-01-05 PROCEDURE — 80048 BASIC METABOLIC PNL TOTAL CA: CPT

## 2020-01-05 PROCEDURE — 83605 ASSAY OF LACTIC ACID: CPT

## 2020-01-05 PROCEDURE — 2580000003 HC RX 258: Performed by: EMERGENCY MEDICINE

## 2020-01-05 PROCEDURE — 2700000000 HC OXYGEN THERAPY PER DAY

## 2020-01-05 PROCEDURE — 2000000000 HC ICU R&B

## 2020-01-05 PROCEDURE — 36415 COLL VENOUS BLD VENIPUNCTURE: CPT

## 2020-01-05 PROCEDURE — 6370000000 HC RX 637 (ALT 250 FOR IP): Performed by: INTERNAL MEDICINE

## 2020-01-05 PROCEDURE — 6360000002 HC RX W HCPCS: Performed by: PHYSICIAN ASSISTANT

## 2020-01-05 PROCEDURE — 99222 1ST HOSP IP/OBS MODERATE 55: CPT | Performed by: INTERNAL MEDICINE

## 2020-01-05 PROCEDURE — 99233 SBSQ HOSP IP/OBS HIGH 50: CPT | Performed by: PSYCHIATRY & NEUROLOGY

## 2020-01-05 PROCEDURE — 84550 ASSAY OF BLOOD/URIC ACID: CPT

## 2020-01-05 PROCEDURE — 2580000003 HC RX 258: Performed by: NEUROLOGICAL SURGERY

## 2020-01-05 PROCEDURE — 6360000002 HC RX W HCPCS: Performed by: PSYCHIATRY & NEUROLOGY

## 2020-01-05 PROCEDURE — 2580000003 HC RX 258: Performed by: PHYSICIAN ASSISTANT

## 2020-01-05 PROCEDURE — 6370000000 HC RX 637 (ALT 250 FOR IP): Performed by: PSYCHIATRY & NEUROLOGY

## 2020-01-05 PROCEDURE — 85025 COMPLETE CBC W/AUTO DIFF WBC: CPT

## 2020-01-05 PROCEDURE — 6360000002 HC RX W HCPCS: Performed by: FAMILY MEDICINE

## 2020-01-05 PROCEDURE — 83970 ASSAY OF PARATHORMONE: CPT

## 2020-01-05 RX ORDER — PANTOPRAZOLE SODIUM 20 MG/1
20 TABLET, DELAYED RELEASE ORAL
Status: DISCONTINUED | OUTPATIENT
Start: 2020-01-06 | End: 2020-01-09 | Stop reason: HOSPADM

## 2020-01-05 RX ORDER — CINACALCET 30 MG/1
30 TABLET, FILM COATED ORAL DAILY
Status: DISCONTINUED | OUTPATIENT
Start: 2020-01-06 | End: 2020-01-09 | Stop reason: HOSPADM

## 2020-01-05 RX ORDER — AMLODIPINE BESYLATE 5 MG/1
5 TABLET ORAL DAILY
Status: DISCONTINUED | OUTPATIENT
Start: 2020-01-05 | End: 2020-01-09 | Stop reason: HOSPADM

## 2020-01-05 RX ADMIN — AMLODIPINE BESYLATE 5 MG: 5 TABLET ORAL at 14:24

## 2020-01-05 RX ADMIN — SULFAMETHOXAZOLE AND TRIMETHOPRIM 1 TABLET: 400; 80 TABLET ORAL at 10:27

## 2020-01-05 RX ADMIN — Medication 10 ML: at 21:20

## 2020-01-05 RX ADMIN — Medication 10 ML: at 21:19

## 2020-01-05 RX ADMIN — TACROLIMUS 2 MG: 1 CAPSULE ORAL at 18:05

## 2020-01-05 RX ADMIN — PREDNISONE 5 MG: 2.5 TABLET ORAL at 10:28

## 2020-01-05 RX ADMIN — DULOXETINE HYDROCHLORIDE 30 MG: 30 CAPSULE, DELAYED RELEASE ORAL at 10:38

## 2020-01-05 RX ADMIN — MYCOPHENOLATE MOFETIL 500 MG: 250 CAPSULE ORAL at 10:32

## 2020-01-05 RX ADMIN — TRAMADOL HYDROCHLORIDE 50 MG: 50 TABLET, FILM COATED ORAL at 04:44

## 2020-01-05 RX ADMIN — LEVETIRACETAM 750 MG: 500 TABLET, FILM COATED ORAL at 21:11

## 2020-01-05 RX ADMIN — DEXAMETHASONE 4 MG: 4 TABLET ORAL at 06:10

## 2020-01-05 RX ADMIN — DOCUSATE SODIUM 100 MG: 100 CAPSULE, LIQUID FILLED ORAL at 21:11

## 2020-01-05 RX ADMIN — CINACALCET HYDROCHLORIDE 60 MG: 30 TABLET, FILM COATED ORAL at 10:27

## 2020-01-05 RX ADMIN — MYCOPHENOLATE MOFETIL 500 MG: 250 CAPSULE ORAL at 21:11

## 2020-01-05 RX ADMIN — DEXAMETHASONE 4 MG: 4 TABLET ORAL at 21:12

## 2020-01-05 RX ADMIN — LEVETIRACETAM 750 MG: 500 TABLET, FILM COATED ORAL at 10:38

## 2020-01-05 RX ADMIN — HYDRALAZINE HYDROCHLORIDE 10 MG: 20 INJECTION INTRAMUSCULAR; INTRAVENOUS at 06:11

## 2020-01-05 RX ADMIN — METOPROLOL TARTRATE 25 MG: 25 TABLET ORAL at 21:11

## 2020-01-05 RX ADMIN — PANTOPRAZOLE SODIUM 40 MG: 40 TABLET, DELAYED RELEASE ORAL at 06:11

## 2020-01-05 RX ADMIN — ENOXAPARIN SODIUM 40 MG: 40 INJECTION SUBCUTANEOUS at 10:27

## 2020-01-05 RX ADMIN — METOPROLOL TARTRATE 25 MG: 25 TABLET, FILM COATED ORAL at 10:27

## 2020-01-05 RX ADMIN — DEXAMETHASONE 4 MG: 4 TABLET ORAL at 14:25

## 2020-01-05 RX ADMIN — TACROLIMUS 3 MG: 1 CAPSULE ORAL at 10:31

## 2020-01-05 RX ADMIN — DOCUSATE SODIUM 100 MG: 100 CAPSULE, LIQUID FILLED ORAL at 10:28

## 2020-01-05 ASSESSMENT — PAIN SCALES - GENERAL
PAINLEVEL_OUTOF10: 0
PAINLEVEL_OUTOF10: 4
PAINLEVEL_OUTOF10: 7
PAINLEVEL_OUTOF10: 1
PAINLEVEL_OUTOF10: 0
PAINLEVEL_OUTOF10: 0
PAINLEVEL_OUTOF10: 7
PAINLEVEL_OUTOF10: 1
PAINLEVEL_OUTOF10: 0
PAINLEVEL_OUTOF10: 1
PAINLEVEL_OUTOF10: 0

## 2020-01-05 ASSESSMENT — ENCOUNTER SYMPTOMS
SHORTNESS OF BREATH: 0
DIARRHEA: 0
NAUSEA: 0
VOMITING: 0
COUGH: 0

## 2020-01-05 ASSESSMENT — PAIN DESCRIPTION - FREQUENCY
FREQUENCY: INTERMITTENT
FREQUENCY: INTERMITTENT

## 2020-01-05 ASSESSMENT — PAIN DESCRIPTION - DESCRIPTORS
DESCRIPTORS: ACHING
DESCRIPTORS: ACHING

## 2020-01-05 ASSESSMENT — PAIN DESCRIPTION - ORIENTATION
ORIENTATION: LEFT;ANTERIOR
ORIENTATION: LEFT;ANTERIOR

## 2020-01-05 ASSESSMENT — PAIN DESCRIPTION - PAIN TYPE
TYPE: ACUTE PAIN
TYPE: ACUTE PAIN

## 2020-01-05 ASSESSMENT — PAIN DESCRIPTION - LOCATION
LOCATION: HEAD
LOCATION: HEAD

## 2020-01-05 NOTE — PROGRESS NOTES
0800  Shift assessment is completed. Neuro check is done and no neuro deficit noted. Pt took herself to the chair and toilet with assistance. 0930  Pt is on chair. Pt has a meal. Family members are at bedside. Pt took all morning meds as ordered. Pt voided 480ml. No complaint of pain . 12:00  Pt back to her bed. 2nd assessment completed. No neuro deficit noted. No complaint of pain. Pt sleeping.

## 2020-01-05 NOTE — PROGRESS NOTES
Neurology Daily Progress Note  Name: Richard Hudson  Age: 69 y.o. Gender: female  CodeStatus: Full Code  Allergies: Benadryl [Diphenhydramine]  Chlorhexidine    Chief Complaint:Altered Mental Status    Primary Care Provider: Rina Hurley DO  InpatientTreatment Team: Treatment Team: Attending Provider: Eloisa De León MD; Consulting Physician: Pamella Platt MD; Consulting Physician: Toula Aschoff, MD; Consulting Physician: Michele Molina MD; Surgeon: Pamella Platt MD; : Abelardo Leonardo RN; Patient Care Tech: Rhonda Angelucci; Patient Care Tech: Cheryl Dc; Registered Nurse: Melyssa Hoover RN  Admission Date: 1/1/2020      HPI   Pt seen and examined for neuro follow up for 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 Pt speaks mostly [place]. Daughter at bedside. Expressive aphasia somewhat improved. No other focal deficits. Denies Headache, double vision, blurry vision, difficulty with speech, difficulty with swallowing, weakness, numbness, pain, nausea, vomiting, choking, neck pain, dizziness  Trnaslation is  obtained from the daughter was at the bedside. BP (!) 145/59   Pulse 73   Temp 98.2 °F (36.8 °C)   Resp 17   Ht 5' 1\" (1.549 m)   Wt 144 lb 11.2 oz (65.6 kg)   SpO2 94%   BMI 27.34 kg/m²                                   Review of Systems   Constitutional: Negative for appetite change, chills, fatigue and fever. HENT: Negative for hearing loss and trouble swallowing. Eyes: Negative for visual disturbance. Respiratory: Negative for cough, chest tightness, shortness of breath and wheezing. Cardiovascular: Negative for chest pain, palpitations and leg swelling. Gastrointestinal: Positive for diarrhea. Negative for constipation, nausea and vomiting. Skin: Negative for color change and rash. Neurological: Positive for speech difficulty. Negative for dizziness, tremors, seizures, syncope, facial asymmetry, weakness, light-headedness, numbness and headaches. Scheduled Medications:    levETIRAcetam  750 mg Oral BID    dexamethasone  4 mg Oral 3 times per day    pantoprazole  40 mg Oral QAM AC    [START ON 2020] ceFAZolin  2 g Intravenous Once    sodium chloride flush  10 mL Intravenous BID    sodium chloride flush  10 mL Intravenous 2 times per day    enoxaparin  40 mg Subcutaneous Daily    sulfamethoxazole-trimethoprim  1 tablet Oral Daily    metoprolol tartrate  25 mg Oral BID    mycophenolate  500 mg Oral BID    DULoxetine  30 mg Oral Daily    cinacalcet  60 mg Oral Daily    tacrolimus  3 mg Oral QAM    tacrolimus  2 mg Oral QPM     PRN Meds: sodium chloride flush, magnesium hydroxide, ondansetron, LORazepam, hydrALAZINE    Labs:   Recent Labs     20  0845 20  0508 20  0459   WBC 3.4* 5.0 7.3   HGB 13.4 12.0 11.7*   HCT 41.3 36.8* 35.7*    154 150     Recent Labs     20  0845 20  0508 20  0459    143 143   K 4.1 4.6  4.6 4.7    109* 107   CO2 21 22 22   BUN 23 26* 30*   CREATININE 1.30* 1.09* 1.13*   CALCIUM 9.4 9.4 9.0     Recent Labs     20  0845   AST 21   ALT 8   BILITOT 0.5   ALKPHOS 100     No results for input(s): INR in the last 72 hours. Recent Labs     20  0845   TROPONINI <0.010       Urinalysis:   Lab Results   Component Value Date    NITRU Negative 2020    BLOODU Negative 2020    SPECGRAV 1.014 2020    GLUCOSEU Negative 2020       Radiology:   Most recent    EEG No procedure found. MRI of Brain   Results for orders placed during the hospital encounter of 20   MRI Brain W WO Contrast    Narrative Patient MRN: [id number]    : 1945    Age:  69 years    Gender: Female    Order Date: 2020 9:45 AM.     Exam: MRI BRAIN W WO CONTRAST    Number of Views: [id number]     Indication:  Stroke    Comparison: Brain MRI 2014. Head CT 2020.     Contrast dosage: MultiHance, 7 mL, IV    Findings:   No evident restricted diffusion or acute/subacute cerebrovascular infarction/accident definitively identified. Pituitary gland and sellar/suprasellar regions are unremarkable. No Chiari malformation. . Hippocampal formations are symmetric in signal intensity and morphology bilaterally given the mass effect on the left compared with the normal-appearing right. No   intrinsic noncontrast T1 shortening. T1 hypointense edema throughout the visualized left frontal, parietal, temporal lobe and basal ganglia. Abnormal T2 FLAIR hyperintensity throughout the left frontal, parietal and temporal lobe cortical and subcortical white matter and also extending in the left basal ganglia. Compression of the left lateral ventricular system. Approximately 0.56 cm left   right subfalcine herniation. There is effacement of the left ambient cistern and the basilar cistern region. No tonsillar herniation. There is a dominant left vertebral artery, otherwise, normal expected appearance of visualized T2 flow voids. Optic globes and orbital contents are within normal limits. Visualized paranasal sinuses and mastoid air cells are free from fluid or mass. . No   intraparenchymal hemorrhage. Pulsation artifact limits evaluation of the postcontrast exam and the cerebellum. Focal large enhancing mass in the left middle cranial fossa cyst left lung/left anterior temporal lobe, measuring approximately 2.3 cm anterior posterior by 2.1 cm   transverse by 1.5 cm craniocaudal.    No dural venous sinus thrombosis or occlusion is identified. No other enhancing mass is seen. Impression 1. Enhancing mass/malignancy in the left middle cranial fossa/left anterior temporal lobe, findings suspicious for meningioma although glioblastoma is not excluded. There is extensive edema extending throughout the left frontal lobe, left parietal lobe   and left basal ganglia/temporal lobe, without evidence of restricted diffusion, greater than expected.  Findings could reflect extensive edema from the mass/malignancy. Other etiologies are not excluded. . Neurosurgical/neurological evaluation recommended. 2. Left to right subfalcine herniation of 0.56 cm. Neurosurgical consultation and evaluation is recommended. 3. Dominant left vertebral artery         No results found for this or any previous visit. MRA of the Head and Neck: No results found for this or any previous visit. No results found for this or any previous visit. Results for orders placed during the hospital encounter of 01/01/20   MRA HEAD WO CONTRAST    Narrative EXAM: MR angiography of the head without contrast.    History: Meningioma    Technique: Multiplanar multisequence MRI of the brain was performed. 3-D time-of-flight axial images were performed through the [place] [facility]. 3-D volume rendered images of the [place] [facility] [place] performed. Comparison: MRI of the brain from January 1, 2000    Findings: The bilateral distal cervical internal carotid arteries through the skull base are patent. The bilateral middle cerebral arteries through the trifurcation and opercular branches are patent. The vertebrobasilar system including the superior cerebellar and posterior cerebral arteries are patent. Left vertebral artery is dominant. Posterior communicating arteries are patent. The bilateral anterior cerebral arteries and anterior communicating artery are patent. No aneurysm or high-grade stenosis of the visualized cerebral vasculature. No significant interval change in edema of the left frontal, parietal, and temporal lobes. No significant interval change of 6 mm of rightward midline shift. Again identified is a 2.3 cm mass within the anterior left middle cranial fossa as detailed on   recent MRI of the brain. Impression No aneurysm or high-grade stenosis, or vascular malformation of the visualized cerebral vasculature.     No significant interval change in left frontal, parietal, and temporal lobe edema and 6 mm of rightward midline shift. CT of the Head:   Results for orders placed during the hospital encounter of 20   CT Head WO Contrast    Narrative Patient MRN: [id number]    : 1945    Age:  69 years    Order Date: 2020 8:45 AM.    Gender: Female    Exam: CT HEAD WO CONTRAST    Number of Images: 141    Indication:   Change in mental status, cerebrovascular accident, hemorrhage    Contrast dosage: None    Comparison: None. Findings: This examination was performed on a CT scanner with dose reduction. Technique: Low-dose CT  acquisition technique included one of following options; 1 . Automated exposure control, 2. Adjustment of MA and or KV according to patient's size or 3. Use of iterative reconstruction. Large area of hypodensity left frontal lobe white matter, left parietal lobe, left temporal lobe, and left basal ganglia. There is 0.56 cm left to right subfalcine herniation. No intraparenchymal hemorrhage definitively identified. No comparison studies. Pituitary gland and sellar/suprasellar regions are unremarkable. No Chiari malformation. . No acute fracture or lytic or blastic bony lesion. Optic globes and orbital contents unremarkable. Vascular calcifications within the bilateral internal carotid   artery cavernous segments and the vertebral arteries. .      Impression Findings communicated directly with Dr. Singh Escalera  on 2020 9:25 AM .   1. Abnormal exam. Large area of hypodensity and edema with mass effect on left frontal, temporal and parietal lobes and left basal ganglia with left to right subfalcine herniation of 0.56 cm. Findings are concerning for potential underlying   mass/malignancy with extensive edema and mass effect versus cerebrovascular infarction/accident with edema and mass effect. Further evaluation MRI of the brain with and without IV contrast is recommended.   2. Vascular calcifications within the bilateral

## 2020-01-05 NOTE — PROGRESS NOTES
Nephrology Progress Note    Assessment:  S/P Kidney transplant cadaver CKD-3 baseline  Brain Mass results  Pending  Immuno compromised with meds for rejection  Hx Gout  Hypertension  Hyperlipidemia  Anemia chronic  Seizure precautions with surgery            Plan:may transfer to [address] [place] [address]  Increase activity watch Cellcept levels and GFR  Control BP  Why sensipar ?  Obtain PTH and phosphorus level decrease pepcid dose 20mg qd and decrease sensipar 30mg qd    Patient Active Problem List:     End stage kidney disease (HCC)     Combined fat and carbohydrate induced hyperlipemia     Essential hypertension     Acid reflux     Gout     Angina, class IV (HCC)     Mixed dyslipidemia     Elevated blood uric acid level     Brain neoplasm (HCC)     Cerebral malignant neoplasm (HCC)     Generalized seizure (HCC)     Aphasia      Subjective:  Admit Date: 1/1/2020    Interval History: daughter in room  patient with big smile sitting in chair  No issues no seizures no headaches speech fine     Medications:  Scheduled Meds:   0.9 % sodium chloride  250 mL Intravenous Once    predniSONE  5 mg Oral Daily    sodium chloride flush  10 mL Intravenous 2 times per day    docusate sodium  100 mg Oral BID    levETIRAcetam  750 mg Oral BID    dexamethasone  4 mg Oral 3 times per day    pantoprazole  40 mg Oral QAM AC    sodium chloride flush  10 mL Intravenous BID    sodium chloride flush  10 mL Intravenous 2 times per day    enoxaparin  40 mg Subcutaneous Daily    sulfamethoxazole-trimethoprim  1 tablet Oral Daily    metoprolol tartrate  25 mg Oral BID    mycophenolate  500 mg Oral BID    DULoxetine  30 mg Oral Daily    cinacalcet  60 mg Oral Daily    tacrolimus  3 mg Oral QAM    tacrolimus  2 mg Oral QPM     Continuous Infusions:   sodium chloride Stopped (01/03/20 2120)    sodium chloride Stopped (01/04/20 1008)       CBC:   Recent Labs     01/04/20  0508 01/05/20  0528   WBC 8.6 7.0   HGB 10.5* 10.2*    151     CMP:

## 2020-01-05 NOTE — CONSULTS
Inpatient consult to GI  Consult performed by: Tresa Bacon MD  Consult ordered by: Lupe Wisdom MD          Patient Name: Negar July Date: 2020  8:35 AM  MR #: [id number]  : 1945    Attending Physician: Lupe Wisdom MD  Reason for consult: Abnormal CT scan     History of Presenting Illness:      Berna Jara is a 69 y.o. female on hospital day 4 with a history of Anxiety, cardiomegaly, depression, GERD, HTN. History Obtained From:  Patient, RN, and EMR  Patient speaks little English. There also may be a component of asphasia as well, per neurology. She is status post left temporal lobe mass resection. Patient with history of a kidney transplant on immunosuppressant drugs. RN reports patient is tolerating PO without complaints of N/V.   GI consulted for an abnormal CT scan, which reports- the proximal esophagus is mildly ectatic. Dilation in the proximal third of the esophagus with debris noted. No surrounding extraluminal air or fluid is noted within the region to suggest darline perforation. Spoke with Thi Davis, the patient's daughter. She reports when her mother eats she coughs and drinks water to get the food down at time. No reports of weight loss or loss of appetite. No regurgitation or odynophagia. Patient denies CP, SOB, or palpitations. No F/C. History:      Past Medical History:   Diagnosis Date    Anxiety     Cardiomegaly     Chronic kidney disease     Colon polyps     Depression     Gallbladder polyp     GERD (gastroesophageal reflux disease)     Gout     Gout     Hypertension     Hyperuricemia     Hypothyroid     Vitamin D deficiency      Past Surgical History:   Procedure Laterality Date    CRANIOTOMY N/A 1/3/2020    CRANIOTOMY FOR EXCISION OF BRAIN TUMOR performed by Lucita Goncalves MD at [address] [place] [address] Left     KIDNEY TRANSPLANT  2018     Family History  History reviewed. No pertinent family history.   Social History Take 60 mg by mouth daily  [DISCONTINUED] aspirin 81 MG chewable tablet, Take 1 tablet by mouth daily  [DISCONTINUED] isosorbide mononitrate (IMDUR) 30 MG extended release tablet, Take 1 tablet by mouth daily  [DISCONTINUED] nitroGLYCERIN (NITROSTAT) 0.4 MG SL tablet, up to max of 3 total doses. If no relief after 1 dose, call 911. [DISCONTINUED] pantoprazole (PROTONIX) 40 MG tablet, Take 1 tablet by mouth every morning (before breakfast)  [DISCONTINUED] hydrALAZINE (APRESOLINE) 100 MG tablet, Take 100 mg by mouth 2 times daily  [DISCONTINUED] cloNIDine (CATAPRES) 0.2 MG/24HR, Place 0.2 patches onto the skin once a week  [DISCONTINUED] metoprolol succinate (TOPROL XL) 50 MG extended release tablet, Take 50 mg by mouth 2 times daily  [DISCONTINUED] amLODIPine (NORVASC) 5 MG tablet, Take 5 mg by mouth daily  [DISCONTINUED] sevelamer (RENVELA) 800 MG tablet, Take 1 tablet by mouth 3 times daily (with meals) Indications: with meals  [DISCONTINUED] cinacalcet (SENSIPAR) 30 MG tablet, Take 30 mg by mouth daily With dinner  [DISCONTINUED] lidocaine-prilocaine (EMLA) 2.5-2.5 % cream, Apply topically three times a week Apply topically as needed.     Current Hospital Medications:   Scheduled Meds:   0.9 % sodium chloride  250 mL Intravenous Once    predniSONE  5 mg Oral Daily    sodium chloride flush  10 mL Intravenous 2 times per day    docusate sodium  100 mg Oral BID    levETIRAcetam  750 mg Oral BID    dexamethasone  4 mg Oral 3 times per day    pantoprazole  40 mg Oral QAM AC    sodium chloride flush  10 mL Intravenous BID    sodium chloride flush  10 mL Intravenous 2 times per day    enoxaparin  40 mg Subcutaneous Daily    sulfamethoxazole-trimethoprim  1 tablet Oral Daily    metoprolol tartrate  25 mg Oral BID    mycophenolate  500 mg Oral BID    DULoxetine  30 mg Oral Daily    cinacalcet  60 mg Oral Daily    tacrolimus  3 mg Oral QAM    tacrolimus  2 mg Oral QPM     Continuous Infusions:   sodium 40 mg Subcutaneous Daily    sulfamethoxazole-trimethoprim  1 tablet Oral Daily    metoprolol tartrate  25 mg Oral BID    mycophenolate  500 mg Oral BID    DULoxetine  30 mg Oral Daily    cinacalcet  60 mg Oral Daily    tacrolimus  3 mg Oral QAM    tacrolimus  2 mg Oral QPM     Continuous Infusions:   sodium chloride Stopped (01/03/20 2120)    sodium chloride Stopped (01/04/20 1008)       Recent Labs     01/03/20  0459 01/04/20  0508 01/05/20  0528   WBC 7.3 8.6 7.0   HGB 11.7* 10.5* 10.2*   HCT 35.7* 32.6* 31.7*   MCV 92.2 92.9 93.3    172 151     Recent Labs     01/03/20 0459 01/04/20  0508 01/05/20  0528    142 140   K 4.7 4.3 4.4    108* 106   CO2 22 20 21   BUN 30* 28* 25*   CREATININE 1.13* 1.22* 1.12*     Ct Head Wo Contrast  Result Date: 1/4/2020  INTERVAL CRANIOTOMY WITH PROBABLE RESECTION REMOVAL OF LEFT anterior MIDDLE CRANIAL FOSSA TUMOR. PERSISTENT DIFFUSE VASOGENIC EDEMA OF THE LEFT FRONTAL PARIETAL TEMPORAL LOBES PERSISTENT EFFACEMENT OF THE LEFT LATERAL VENTRICLE WITH SHIFT OF MIDLINE TO THE RIGHT. OTHER FINDINGS DETAILED ABOVE     Ct Head Wo Contrast  Result Date: 1/1/2020  Findings communicated directly with Dr. Adilia Sanders  on 1/1/2020 9:25 AM . 1. Abnormal exam. Large area of hypodensity and edema with mass effect on left frontal, temporal and parietal lobes and left basal ganglia with left to right subfalcine herniation of 0.56 cm. Findings are concerning for potential underlying mass/malignancy with extensive edema and mass effect versus cerebrovascular infarction/accident with edema and mass effect. Further evaluation MRI of the brain with and without IV contrast is recommended. 2. Vascular calcifications within the bilateral internal carotid artery cavernous segments and the vertebral arteries. Ct Chest Wo Contrast  Result Date: 1/4/2020  1. THE PROXIMAL ESOPHAGUS IS MILDLY ECTATIC. THERE IS AN AREA OF PROXIMAL. STILL DILATATION WITH DEBRIS NOTED.  FINDINGS MAY REPRESENT AN AREA OF IMPACTION. THERE MAY BE A ASSOCIATED ESOPHAGEAL DIVERTICULUM. NO LINDA PERFORATION. FURTHER EVALUATION RECOMMENDED. CONSIDER GI CONSULT 2. THE VISUALIZED PORTION OF KIDNEYS SHOW THAT THERE ARE ATROPHIC. THERE IS AREAS LOW-ATTENUATION BILATERALLY LIKELY RENAL CYST BUT NOT FULLY CHARACTERIZED IN THIS NONCONTRAST STUDY. CORRELATE CLINICALLY AND FOLLOW-UP. OTHER FINDINGS AS DETAILED ABOVE     Mra Head Wo Contrast  Result Date: 1/2/2020  No aneurysm or high-grade stenosis, or vascular malformation of the visualized cerebral vasculature. No significant interval change in left frontal, parietal, and temporal lobe edema and 6 mm of rightward midline shift. Xr Chest Portable  Result Date: 1/1/2020  Impression:  1. Possible nodular abnormality left lower lung, not well characterized, measuring 1.4 cm, correlation with CT scan chest recommended. 2. Enlarged cardiomediastinal silhouette, the possibility of underlying pericardial effusion or other cardiac abnormalities are not excluded on the basis of this exam, clinical correlation recommended. . 3. Nonspecific bibasilar airspace disease, findings can be seen in infiltrate/pneumonia and/or atelectasis. Maria G Kwon 4. Vascular calcifications thoracic aorta     Mri Brain W Wo Contrast  Result Date: 1/1/2020  1. Enhancing mass/malignancy in the left middle cranial fossa/left anterior temporal lobe, findings suspicious for meningioma although glioblastoma is not excluded. There is extensive edema extending throughout the left frontal lobe, left parietal lobe and left basal ganglia/temporal lobe, without evidence of restricted diffusion, greater than expected. Findings could reflect extensive edema from the mass/malignancy. Other etiologies are not excluded. . Neurosurgical/neurological evaluation recommended. 2. Left to right subfalcine herniation of 0.56 cm. Neurosurgical consultation and evaluation is recommended.  3. Dominant left vertebral artery     Impression:   74 year old female patient status post left lobe brain mass. Patient speaks [place] and there is a component of aphasia. GI consulted for abnormal CT scan of the esophagus. CT scan reports proximal esophogeal dilation. Patient with history of coughing during meals and having to drink liquids at times to get food bolus to pass. No history of upper endoscopy in the past. Will proceed with EGD to exclude any stricture in the distal esophagus, rule out any diverticula. Will likely require modified barium swallow to evaluate swallowing function further given history of intracranial mass lesion. Plan:   - Continue course of care  - EGD next   - If negative, proceed with modified barium swallow next  - NPO 1/6/19 at 0800, ok for clear liquid breakfast  Comments: Thank you for allowing us to participate in the care of this patient. Will continue to follow. Please call if questions or concerns arise. Electronically signed by Shobha Hodge MD on 1/5/2020 at 12:10 PM    Please note this report has been partially produced using speech recognition software and may cause contain errors related to that system including grammar, punctuation and spelling as well as words and phrases that may seem inappropriate. If there are questions or concerns please feel free to contact me to clarify.

## 2020-01-05 NOTE — PROGRESS NOTES
Richard Hudson is a 69 y.o. female patient.  Pt was seen and evaluated, no overnight events, no new complaints, used Dr. Jessica Mosqueda as a     Current Facility-Administered Medications   Medication Dose Route Frequency Provider Last Rate Last Dose    traMADol (ULTRAM) tablet 50 mg  50 mg Oral Q6H PRN Pamella Platt MD   50 mg at 01/05/20 0444    0.9 % sodium chloride infusion   Intravenous Continuous Eloisa De León MD   Stopped at 01/03/20 2120    0.9 % sodium chloride infusion 250 mL  250 mL Intravenous Once Pamella Platt MD   Stopped at 01/03/20 2314    predniSONE (DELTASONE) tablet 5 mg  5 mg Oral Daily Pamella Platt MD   5 mg at 01/05/20 1028    0.9 % sodium chloride infusion   Intravenous Continuous Pamella Platt MD   Stopped at 01/04/20 1008    sodium chloride flush 0.9 % injection 10 mL  10 mL Intravenous 2 times per day Pamella Platt MD   10 mL at 01/04/20 2039    sodium chloride flush 0.9 % injection 10 mL  10 mL Intravenous PRN Pamella Platt MD        HYDROmorphone (DILAUDID) injection 0.25 mg  0.25 mg Intravenous Q3H PRN Pamella Platt MD   0.25 mg at 01/04/20 1332    Or    HYDROmorphone (DILAUDID) injection 0.5 mg  0.5 mg Intravenous Q3H PRN Pamella Platt MD   0.5 mg at 01/04/20 0644    docusate sodium (COLACE) capsule 100 mg  100 mg Oral BID Pamella Platt MD   100 mg at 01/05/20 1028    ondansetron (ZOFRAN) injection 4 mg  4 mg Intravenous Q6H PRN Pamella Platt MD   4 mg at 01/04/20 2137    labetalol (NORMODYNE;TRANDATE) injection syringe 10 mg  10 mg Intravenous Q2H PRN Andrew Natarajan MD   10 mg at 01/04/20 0000    hydrALAZINE (APRESOLINE) injection 10 mg  10 mg Intravenous Q2H PRN Andrew Natarajan MD   10 mg at 01/05/20 5287    levETIRAcetam (KEPPRA) tablet 750 mg  750 mg Oral BID Toula Aschoff, MD   750 mg at 01/05/20 1038    dexamethasone (DECADRON) tablet 4 mg  4 mg Oral 3 times per day Toula Aschoff, MD   4 mg at 01/05/20 0610    pantoprazole (PROTONIX) tablet 40 mg  40 mg Oral TOMMY Richards neurology and neurosyrgery  6) lung scarring, reactive airway dz  No pulmonary symptoms now  pulm as outpt  CT chest noted will consult WALT Villar MD  1/5/2020

## 2020-01-06 ENCOUNTER — ANESTHESIA (OUTPATIENT)
Dept: OPERATING ROOM | Age: 75
DRG: 025 | End: 2020-01-06
Payer: COMMERCIAL

## 2020-01-06 ENCOUNTER — APPOINTMENT (OUTPATIENT)
Dept: MRI IMAGING | Age: 75
DRG: 025 | End: 2020-01-06
Payer: COMMERCIAL

## 2020-01-06 ENCOUNTER — ANESTHESIA EVENT (OUTPATIENT)
Dept: OPERATING ROOM | Age: 75
DRG: 025 | End: 2020-01-06
Payer: COMMERCIAL

## 2020-01-06 VITALS — SYSTOLIC BLOOD PRESSURE: 151 MMHG | OXYGEN SATURATION: 100 % | DIASTOLIC BLOOD PRESSURE: 70 MMHG

## 2020-01-06 LAB
ANION GAP SERPL CALCULATED.3IONS-SCNC: 12 MEQ/L (ref 9–15)
BASOPHILS ABSOLUTE: 0 K/UL (ref 0–0.2)
BASOPHILS RELATIVE PERCENT: 0.2 %
BUN BLDV-MCNC: 24 MG/DL (ref 8–23)
CALCIUM SERPL-MCNC: 8.9 MG/DL (ref 8.5–9.9)
CHLORIDE BLD-SCNC: 106 MEQ/L (ref 95–107)
CO2: 23 MEQ/L (ref 20–31)
CREAT SERPL-MCNC: 0.99 MG/DL (ref 0.5–0.9)
EOSINOPHILS ABSOLUTE: 0 K/UL (ref 0–0.7)
EOSINOPHILS RELATIVE PERCENT: 0 %
GFR AFRICAN AMERICAN: >60
GFR NON-AFRICAN AMERICAN: 55.1
GLUCOSE BLD-MCNC: 116 MG/DL (ref 70–99)
HCT VFR BLD CALC: 32.5 % (ref 37–47)
HEMOGLOBIN: 10.4 G/DL (ref 12–16)
LACTIC ACID: 1.1 MMOL/L (ref 0.5–2.2)
LYMPHOCYTES ABSOLUTE: 0.3 K/UL (ref 1–4.8)
LYMPHOCYTES RELATIVE PERCENT: 5.9 %
MCH RBC QN AUTO: 29.6 PG (ref 27–31.3)
MCHC RBC AUTO-ENTMCNC: 31.9 % (ref 33–37)
MCV RBC AUTO: 92.8 FL (ref 82–100)
MONOCYTES ABSOLUTE: 0.5 K/UL (ref 0.2–0.8)
MONOCYTES RELATIVE PERCENT: 9.2 %
NEUTROPHILS ABSOLUTE: 4.3 K/UL (ref 1.4–6.5)
NEUTROPHILS RELATIVE PERCENT: 84.7 %
PDW BLD-RTO: 13.9 % (ref 11.5–14.5)
PLATELET # BLD: 148 K/UL (ref 130–400)
POTASSIUM REFLEX MAGNESIUM: 4.5 MEQ/L (ref 3.4–4.9)
RBC # BLD: 3.51 M/UL (ref 4.2–5.4)
SODIUM BLD-SCNC: 141 MEQ/L (ref 135–144)
WBC # BLD: 5.1 K/UL (ref 4.8–10.8)

## 2020-01-06 PROCEDURE — 6370000000 HC RX 637 (ALT 250 FOR IP): Performed by: INTERNAL MEDICINE

## 2020-01-06 PROCEDURE — 36415 COLL VENOUS BLD VENIPUNCTURE: CPT

## 2020-01-06 PROCEDURE — 80048 BASIC METABOLIC PNL TOTAL CA: CPT

## 2020-01-06 PROCEDURE — 6360000002 HC RX W HCPCS: Performed by: NURSE ANESTHETIST, CERTIFIED REGISTERED

## 2020-01-06 PROCEDURE — 6370000000 HC RX 637 (ALT 250 FOR IP): Performed by: NEUROLOGICAL SURGERY

## 2020-01-06 PROCEDURE — 2709999900 HC NON-CHARGEABLE SUPPLY: Performed by: INTERNAL MEDICINE

## 2020-01-06 PROCEDURE — 2580000003 HC RX 258: Performed by: EMERGENCY MEDICINE

## 2020-01-06 PROCEDURE — 6370000000 HC RX 637 (ALT 250 FOR IP): Performed by: PSYCHIATRY & NEUROLOGY

## 2020-01-06 PROCEDURE — 99232 SBSQ HOSP IP/OBS MODERATE 35: CPT | Performed by: PSYCHIATRY & NEUROLOGY

## 2020-01-06 PROCEDURE — 6360000002 HC RX W HCPCS: Performed by: FAMILY MEDICINE

## 2020-01-06 PROCEDURE — 85025 COMPLETE CBC W/AUTO DIFF WBC: CPT

## 2020-01-06 PROCEDURE — 6360000002 HC RX W HCPCS: Performed by: PSYCHIATRY & NEUROLOGY

## 2020-01-06 PROCEDURE — 2580000003 HC RX 258: Performed by: PHYSICIAN ASSISTANT

## 2020-01-06 PROCEDURE — 43235 EGD DIAGNOSTIC BRUSH WASH: CPT | Performed by: INTERNAL MEDICINE

## 2020-01-06 PROCEDURE — 3609017100 HC EGD: Performed by: INTERNAL MEDICINE

## 2020-01-06 PROCEDURE — 2580000003 HC RX 258: Performed by: NEUROLOGICAL SURGERY

## 2020-01-06 PROCEDURE — 2000000000 HC ICU R&B

## 2020-01-06 PROCEDURE — 3700000001 HC ADD 15 MINUTES (ANESTHESIA): Performed by: INTERNAL MEDICINE

## 2020-01-06 PROCEDURE — 83605 ASSAY OF LACTIC ACID: CPT

## 2020-01-06 PROCEDURE — 3700000000 HC ANESTHESIA ATTENDED CARE: Performed by: INTERNAL MEDICINE

## 2020-01-06 PROCEDURE — 0DJ08ZZ INSPECTION OF UPPER INTESTINAL TRACT, VIA NATURAL OR ARTIFICIAL OPENING ENDOSCOPIC: ICD-10-PCS | Performed by: INTERNAL MEDICINE

## 2020-01-06 PROCEDURE — 2500000003 HC RX 250 WO HCPCS: Performed by: NURSE ANESTHETIST, CERTIFIED REGISTERED

## 2020-01-06 RX ORDER — METOCLOPRAMIDE HYDROCHLORIDE 5 MG/ML
10 INJECTION INTRAMUSCULAR; INTRAVENOUS
Status: DISCONTINUED | OUTPATIENT
Start: 2020-01-06 | End: 2020-01-06 | Stop reason: HOSPADM

## 2020-01-06 RX ORDER — MEPERIDINE HYDROCHLORIDE 25 MG/ML
12.5 INJECTION INTRAMUSCULAR; INTRAVENOUS; SUBCUTANEOUS EVERY 5 MIN PRN
Status: DISCONTINUED | OUTPATIENT
Start: 2020-01-06 | End: 2020-01-06 | Stop reason: HOSPADM

## 2020-01-06 RX ORDER — PROPOFOL 10 MG/ML
INJECTION, EMULSION INTRAVENOUS PRN
Status: DISCONTINUED | OUTPATIENT
Start: 2020-01-06 | End: 2020-01-06 | Stop reason: SDUPTHER

## 2020-01-06 RX ORDER — LIDOCAINE HYDROCHLORIDE 20 MG/ML
INJECTION, SOLUTION INFILTRATION; PERINEURAL PRN
Status: DISCONTINUED | OUTPATIENT
Start: 2020-01-06 | End: 2020-01-06 | Stop reason: SDUPTHER

## 2020-01-06 RX ORDER — SODIUM CHLORIDE, SODIUM LACTATE, POTASSIUM CHLORIDE, CALCIUM CHLORIDE 600; 310; 30; 20 MG/100ML; MG/100ML; MG/100ML; MG/100ML
INJECTION, SOLUTION INTRAVENOUS
Status: DISPENSED
Start: 2020-01-06 | End: 2020-01-07

## 2020-01-06 RX ORDER — ONDANSETRON 2 MG/ML
4 INJECTION INTRAMUSCULAR; INTRAVENOUS
Status: DISCONTINUED | OUTPATIENT
Start: 2020-01-06 | End: 2020-01-06 | Stop reason: HOSPADM

## 2020-01-06 RX ORDER — SODIUM CHLORIDE, SODIUM LACTATE, POTASSIUM CHLORIDE, CALCIUM CHLORIDE 600; 310; 30; 20 MG/100ML; MG/100ML; MG/100ML; MG/100ML
INJECTION, SOLUTION INTRAVENOUS CONTINUOUS
Status: DISCONTINUED | OUTPATIENT
Start: 2020-01-06 | End: 2020-01-09 | Stop reason: HOSPADM

## 2020-01-06 RX ADMIN — TACROLIMUS 2 MG: 1 CAPSULE ORAL at 17:42

## 2020-01-06 RX ADMIN — PROPOFOL 20 MG: 10 INJECTION, EMULSION INTRAVENOUS at 14:44

## 2020-01-06 RX ADMIN — PROPOFOL 20 MG: 10 INJECTION, EMULSION INTRAVENOUS at 14:42

## 2020-01-06 RX ADMIN — CINACALCET 30 MG: 30 TABLET, FILM COATED ORAL at 08:47

## 2020-01-06 RX ADMIN — HYDRALAZINE HYDROCHLORIDE 10 MG: 20 INJECTION INTRAMUSCULAR; INTRAVENOUS at 06:26

## 2020-01-06 RX ADMIN — LIDOCAINE HYDROCHLORIDE 40 MG: 20 INJECTION, SOLUTION INFILTRATION; PERINEURAL at 14:40

## 2020-01-06 RX ADMIN — AMLODIPINE BESYLATE 5 MG: 5 TABLET ORAL at 08:47

## 2020-01-06 RX ADMIN — Medication 10 ML: at 08:53

## 2020-01-06 RX ADMIN — LEVETIRACETAM 750 MG: 500 TABLET, FILM COATED ORAL at 08:47

## 2020-01-06 RX ADMIN — DEXAMETHASONE 4 MG: 4 TABLET ORAL at 05:51

## 2020-01-06 RX ADMIN — SULFAMETHOXAZOLE AND TRIMETHOPRIM 1 TABLET: 400; 80 TABLET ORAL at 08:47

## 2020-01-06 RX ADMIN — PROPOFOL 30 MG: 10 INJECTION, EMULSION INTRAVENOUS at 14:40

## 2020-01-06 RX ADMIN — TACROLIMUS 3 MG: 1 CAPSULE ORAL at 08:49

## 2020-01-06 RX ADMIN — DEXAMETHASONE 4 MG: 4 TABLET ORAL at 15:22

## 2020-01-06 RX ADMIN — METOPROLOL TARTRATE 25 MG: 25 TABLET ORAL at 21:21

## 2020-01-06 RX ADMIN — Medication 10 ML: at 22:12

## 2020-01-06 RX ADMIN — DOCUSATE SODIUM 100 MG: 100 CAPSULE, LIQUID FILLED ORAL at 08:48

## 2020-01-06 RX ADMIN — DULOXETINE HYDROCHLORIDE 30 MG: 30 CAPSULE, DELAYED RELEASE ORAL at 08:47

## 2020-01-06 RX ADMIN — METOPROLOL TARTRATE 25 MG: 25 TABLET ORAL at 08:48

## 2020-01-06 RX ADMIN — MYCOPHENOLATE MOFETIL 500 MG: 250 CAPSULE ORAL at 22:11

## 2020-01-06 RX ADMIN — MYCOPHENOLATE MOFETIL 500 MG: 250 CAPSULE ORAL at 08:48

## 2020-01-06 RX ADMIN — Medication 10 ML: at 08:52

## 2020-01-06 RX ADMIN — PANTOPRAZOLE SODIUM 20 MG: 20 TABLET, DELAYED RELEASE ORAL at 05:52

## 2020-01-06 RX ADMIN — PREDNISONE 5 MG: 2.5 TABLET ORAL at 08:47

## 2020-01-06 RX ADMIN — DOCUSATE SODIUM 100 MG: 100 CAPSULE, LIQUID FILLED ORAL at 21:21

## 2020-01-06 RX ADMIN — LEVETIRACETAM 750 MG: 500 TABLET, FILM COATED ORAL at 21:17

## 2020-01-06 RX ADMIN — DEXAMETHASONE 4 MG: 4 TABLET ORAL at 21:21

## 2020-01-06 ASSESSMENT — ENCOUNTER SYMPTOMS
VOMITING: 0
DIARRHEA: 0
SHORTNESS OF BREATH: 0
COUGH: 0
NAUSEA: 0

## 2020-01-06 ASSESSMENT — PAIN SCALES - GENERAL
PAINLEVEL_OUTOF10: 0

## 2020-01-06 ASSESSMENT — PULMONARY FUNCTION TESTS
PIF_VALUE: 0

## 2020-01-06 NOTE — ANESTHESIA PRE PROCEDURE
0848    amLODIPine (NORVASC) tablet 5 mg  5 mg Oral Daily Earl Daileyt Bescak, DO   5 mg at 01/06/20 0847    pantoprazole (PROTONIX) tablet 20 mg  20 mg Oral QAM AC Earl Eckert Bescak, DO   20 mg at 01/06/20 6575    cinacalcet (SENSIPAR) tablet 30 mg  30 mg Oral Daily Earl Babar Bescak, DO   30 mg at 01/06/20 0847    traMADol (ULTRAM) tablet 50 mg  50 mg Oral Q6H PRN Mari Moreira MD   50 mg at 01/05/20 0444    0.9 % sodium chloride infusion   Intravenous Continuous Marni Reza MD   Stopped at 01/03/20 2120    0.9 % sodium chloride infusion 250 mL  250 mL Intravenous Once Mari Moreira MD   Stopped at 01/03/20 2314    predniSONE (DELTASONE) tablet 5 mg  5 mg Oral Daily Mari Moreira MD   5 mg at 01/06/20 0847    0.9 % sodium chloride infusion   Intravenous Continuous Mari Moreira MD   Stopped at 01/04/20 1008    sodium chloride flush 0.9 % injection 10 mL  10 mL Intravenous 2 times per day Mari Moreira MD   10 mL at 01/06/20 0852    sodium chloride flush 0.9 % injection 10 mL  10 mL Intravenous PRN Mari Moreira MD        HYDROmorphone (DILAUDID) injection 0.25 mg  0.25 mg Intravenous Q3H PRN Mari Moreira MD   0.25 mg at 01/04/20 1332    Or    HYDROmorphone (DILAUDID) injection 0.5 mg  0.5 mg Intravenous Q3H PRN Mari Moreira MD   0.5 mg at 01/04/20 0644    docusate sodium (COLACE) capsule 100 mg  100 mg Oral BID Mari Moreira MD   100 mg at 01/06/20 0848    ondansetron (ZOFRAN) injection 4 mg  4 mg Intravenous Q6H PRN Mari Moreira MD   4 mg at 01/04/20 2137    labetalol (NORMODYNE;TRANDATE) injection syringe 10 mg  10 mg Intravenous Q2H PRN Kenia Grande MD   10 mg at 01/04/20 0000    hydrALAZINE (APRESOLINE) injection 10 mg  10 mg Intravenous Q2H PRN Kenia Grande MD   10 mg at 01/06/20 0626    levETIRAcetam (KEPPRA) tablet 750 mg  750 mg Oral BID Una Nina MD   750 mg at 01/06/20 0847    dexamethasone (DECADRON) tablet 4 mg  4 mg Oral 3 times per day Una Nina MD   4 mg at 01/06/20 0551    sodium 0.99 01/06/2020    GFRAA >60.0 01/06/2020    LABGLOM 55.1 01/06/2020    GLUCOSE 116 01/06/2020    PROT 6.7 01/01/2020    CALCIUM 8.9 01/06/2020    BILITOT 0.5 01/01/2020    ALKPHOS 100 01/01/2020    AST 21 01/01/2020    ALT 8 01/01/2020       POC Tests: No results for input(s): POCGLU, POCNA, POCK, POCCL, POCBUN, POCHEMO, POCHCT in the last 72 hours. Coags:   Lab Results   Component Value Date    PROTIME 10.7 05/09/2017    INR 1.0 05/09/2017       HCG (If Applicable): No results found for: PREGTESTUR, PREGSERUM, HCG, HCGQUANT     ABGs: No results found for: PHART, PO2ART, GQS8OFF, SDP1PNB, BEART, G1NJAYUD     Type & Screen (If Applicable):  No results found for: LABABO, LABRH    Anesthesia Evaluation    Airway: Mallampati: II  TM distance: >3 FB     Mouth opening: > = 3 FB Dental: normal exam         Pulmonary:Negative Pulmonary ROS breath sounds clear to auscultation                             Cardiovascular:    (+) hypertension:,         Rhythm: regular             Beta Blocker:  Dose within 24 Hrs         Neuro/Psych:   (+) depression/anxiety              ROS comment: LT anterior temporal intracerebral tumor S/p craniotomy 3 days ago GI/Hepatic/Renal:   (+) GERD:,          ROS comment: S/p kidney transplant. Endo/Other:    (+) hypothyroidism::., .                 Abdominal:           Vascular: negative vascular ROS. Anesthesia Plan      MAC     ASA 3       Induction: intravenous. MIPS: Prophylactic antiemetics administered. Anesthetic plan and risks discussed with patient and child/children. Plan discussed with CRNA.     Attending anesthesiologist reviewed and agrees with Pre Eval content              Tom Ontiveros MD   1/6/2020

## 2020-01-06 NOTE — OP NOTE
Endoscopy Note    Patient: Paty Celaya  : 1945    Procedure: Esophagogastroduodenoscopy    Date:  2020     Surgeon:  María Elena Franco MD    Preoperative Diagnosis:  Abnormal CT chest, dilated esophagus, intermittent dysphagia    Postoperative Diagnosis: Large esophageal diverticula at 26 cm into the esophagus, no stricture, normal lower esophageal sphincter    Anesthesia: MAC    Indications: This is a 69y.o. year old female currently inpatient after brain surgery for meningioma abnormal CT scan showing dilation of the esophagus, patient with intermittent recurrent dysphagia. Description of Procedure:  Informed consent was obtained from the patient after explanation of indications, benefits and possible risks and complications of the procedure. The patient was then taken to the endoscopy suite, placed in the left lateral decubitus position and the above IV sedation was administrered. The Olympus videoendoscope was placed in the patient's mouth and under direct visualization passed into the esophagus. Visualization of the esophagus demonstrated normal mucosa. A large diverticula was noted at 26 cm from the incisors, it likely measures 1.5 x 2 cm, no food debris was noted within the diverticula. The diaphragmatic hiatus was noted at 44 cm, top of the gastric folds were noted at 44 cm from the incisors, Gastroesophageal junction was noted at 44 cm and  squamocolumnar junction was noted at 44 cm. The scope was then advanced into the stomach. Visualization of the gastric body and antrum demonstrated normal mucosa. A retroflexed exam of the gastric cardia and fundus demonstrated normal mucosa. The pylorus was patent and the scope was advanced into the duodenum. Visualization of the duodenal bulb demonstrated normal mucosa. The second portion of the duodenum demonstrated normal mucosa.     The scope was then withdrawn back into the stomach, it was decompressed, and the scope was completely withdrawn. The patient tolerated the procedure well and was taken to the post anesthesia care unit in good condition.     EBL: None  Complication: None  Specimen Sent: No    Impression:    - Large esophageal diverticula at 26 cm into the esophagus, no stricture, normal lower esophageal sphincter    Recommendations:   -Observe clinical course  -If any concern with oropharyngeal dysphagia, consider modified video barium swallow    Chavez Basilio MD  [facility]

## 2020-01-07 LAB
ANION GAP SERPL CALCULATED.3IONS-SCNC: 11 MEQ/L (ref 9–15)
BASOPHILS ABSOLUTE: 0 K/UL (ref 0–0.2)
BASOPHILS RELATIVE PERCENT: 0.3 %
BUN BLDV-MCNC: 30 MG/DL (ref 8–23)
CALCIUM SERPL-MCNC: 8.9 MG/DL (ref 8.5–9.9)
CHLORIDE BLD-SCNC: 103 MEQ/L (ref 95–107)
CO2: 24 MEQ/L (ref 20–31)
CREAT SERPL-MCNC: 0.99 MG/DL (ref 0.5–0.9)
EOSINOPHILS ABSOLUTE: 0 K/UL (ref 0–0.7)
EOSINOPHILS RELATIVE PERCENT: 0 %
GFR AFRICAN AMERICAN: >60
GFR NON-AFRICAN AMERICAN: 55.1
GLUCOSE BLD-MCNC: 126 MG/DL (ref 70–99)
HCT VFR BLD CALC: 31 % (ref 37–47)
HEMOGLOBIN: 10.1 G/DL (ref 12–16)
LACTIC ACID: 1.1 MMOL/L (ref 0.5–2.2)
LYMPHOCYTES ABSOLUTE: 0.2 K/UL (ref 1–4.8)
LYMPHOCYTES RELATIVE PERCENT: 4 %
MCH RBC QN AUTO: 30 PG (ref 27–31.3)
MCHC RBC AUTO-ENTMCNC: 32.4 % (ref 33–37)
MCV RBC AUTO: 92.4 FL (ref 82–100)
METAMYELOCYTES RELATIVE PERCENT: 1 %
MONOCYTES ABSOLUTE: 0.2 K/UL (ref 0.2–0.8)
MONOCYTES RELATIVE PERCENT: 4.8 %
NEUTROPHILS ABSOLUTE: 3.8 K/UL (ref 1.4–6.5)
NEUTROPHILS RELATIVE PERCENT: 91 %
PDW BLD-RTO: 13.9 % (ref 11.5–14.5)
PLATELET # BLD: 146 K/UL (ref 130–400)
PLATELET SLIDE REVIEW: NORMAL
POTASSIUM REFLEX MAGNESIUM: 4.4 MEQ/L (ref 3.4–4.9)
RBC # BLD: 3.36 M/UL (ref 4.2–5.4)
RBC # BLD: NORMAL 10*6/UL
SLIDE REVIEW: ABNORMAL
SODIUM BLD-SCNC: 138 MEQ/L (ref 135–144)
WBC # BLD: 4.1 K/UL (ref 4.8–10.8)

## 2020-01-07 PROCEDURE — 6370000000 HC RX 637 (ALT 250 FOR IP): Performed by: INTERNAL MEDICINE

## 2020-01-07 PROCEDURE — 6360000002 HC RX W HCPCS: Performed by: NEUROLOGICAL SURGERY

## 2020-01-07 PROCEDURE — 85025 COMPLETE CBC W/AUTO DIFF WBC: CPT

## 2020-01-07 PROCEDURE — 6370000000 HC RX 637 (ALT 250 FOR IP): Performed by: NEUROLOGICAL SURGERY

## 2020-01-07 PROCEDURE — 6370000000 HC RX 637 (ALT 250 FOR IP): Performed by: PSYCHIATRY & NEUROLOGY

## 2020-01-07 PROCEDURE — 6360000002 HC RX W HCPCS: Performed by: PSYCHIATRY & NEUROLOGY

## 2020-01-07 PROCEDURE — 6360000002 HC RX W HCPCS: Performed by: FAMILY MEDICINE

## 2020-01-07 PROCEDURE — 1210000000 HC MED SURG R&B

## 2020-01-07 PROCEDURE — 6360000002 HC RX W HCPCS: Performed by: PHYSICIAN ASSISTANT

## 2020-01-07 PROCEDURE — 2580000003 HC RX 258: Performed by: NEUROLOGICAL SURGERY

## 2020-01-07 PROCEDURE — 36415 COLL VENOUS BLD VENIPUNCTURE: CPT

## 2020-01-07 PROCEDURE — 2580000003 HC RX 258: Performed by: EMERGENCY MEDICINE

## 2020-01-07 PROCEDURE — 80048 BASIC METABOLIC PNL TOTAL CA: CPT

## 2020-01-07 PROCEDURE — 83605 ASSAY OF LACTIC ACID: CPT

## 2020-01-07 PROCEDURE — 2580000003 HC RX 258: Performed by: PHYSICIAN ASSISTANT

## 2020-01-07 RX ADMIN — ENOXAPARIN SODIUM 40 MG: 40 INJECTION SUBCUTANEOUS at 08:36

## 2020-01-07 RX ADMIN — PANTOPRAZOLE SODIUM 20 MG: 20 TABLET, DELAYED RELEASE ORAL at 08:37

## 2020-01-07 RX ADMIN — MYCOPHENOLATE MOFETIL 500 MG: 250 CAPSULE ORAL at 08:37

## 2020-01-07 RX ADMIN — LEVETIRACETAM 750 MG: 500 TABLET, FILM COATED ORAL at 22:17

## 2020-01-07 RX ADMIN — PREDNISONE 5 MG: 2.5 TABLET ORAL at 08:37

## 2020-01-07 RX ADMIN — HYDRALAZINE HYDROCHLORIDE 10 MG: 20 INJECTION INTRAMUSCULAR; INTRAVENOUS at 01:58

## 2020-01-07 RX ADMIN — AMLODIPINE BESYLATE 5 MG: 5 TABLET ORAL at 08:36

## 2020-01-07 RX ADMIN — Medication 10 ML: at 08:41

## 2020-01-07 RX ADMIN — CINACALCET 30 MG: 30 TABLET, FILM COATED ORAL at 08:37

## 2020-01-07 RX ADMIN — DEXAMETHASONE 4 MG: 4 TABLET ORAL at 15:08

## 2020-01-07 RX ADMIN — TACROLIMUS 2 MG: 1 CAPSULE ORAL at 22:18

## 2020-01-07 RX ADMIN — LEVETIRACETAM 750 MG: 500 TABLET, FILM COATED ORAL at 08:36

## 2020-01-07 RX ADMIN — Medication 10 ML: at 22:19

## 2020-01-07 RX ADMIN — METOPROLOL TARTRATE 25 MG: 25 TABLET ORAL at 08:37

## 2020-01-07 RX ADMIN — DULOXETINE HYDROCHLORIDE 30 MG: 30 CAPSULE, DELAYED RELEASE ORAL at 08:36

## 2020-01-07 RX ADMIN — METOPROLOL TARTRATE 25 MG: 25 TABLET ORAL at 22:18

## 2020-01-07 RX ADMIN — SULFAMETHOXAZOLE AND TRIMETHOPRIM 1 TABLET: 400; 80 TABLET ORAL at 08:36

## 2020-01-07 RX ADMIN — DEXAMETHASONE INTENSOL 4 MG: 1 SOLUTION, CONCENTRATE ORAL at 22:18

## 2020-01-07 RX ADMIN — DOCUSATE SODIUM 100 MG: 100 CAPSULE, LIQUID FILLED ORAL at 22:17

## 2020-01-07 RX ADMIN — TACROLIMUS 3 MG: 1 CAPSULE ORAL at 08:37

## 2020-01-07 RX ADMIN — MYCOPHENOLATE MOFETIL 500 MG: 250 CAPSULE ORAL at 22:19

## 2020-01-07 RX ADMIN — DEXAMETHASONE 4 MG: 4 TABLET ORAL at 07:44

## 2020-01-07 RX ADMIN — ONDANSETRON 4 MG: 2 INJECTION INTRAMUSCULAR; INTRAVENOUS at 02:07

## 2020-01-07 RX ADMIN — DOCUSATE SODIUM 100 MG: 100 CAPSULE, LIQUID FILLED ORAL at 08:37

## 2020-01-07 ASSESSMENT — ENCOUNTER SYMPTOMS
NAUSEA: 0
COUGH: 0
DIARRHEA: 0
VOMITING: 0
SHORTNESS OF BREATH: 0

## 2020-01-07 ASSESSMENT — PAIN SCALES - GENERAL
PAINLEVEL_OUTOF10: 0

## 2020-01-07 NOTE — CARE COORDINATION
LSW spoke with pt's daughter regarding the DC plan for pt. Daughter does not have things ready for pt to come home tonight. Daughter believes that pt is doing well but that she is still weak and could not be left alone at home. PT/OT to assess and then Pt, daughter and LSW will discuss DC plan. Francine Chakraborty aware that physician wants rehab for the pt and will follow to see if there are needs. precert needed.

## 2020-01-07 NOTE — PLAN OF CARE
Nutrition Problem: Increased nutrient needs  Intervention: Food and/or Nutrient Delivery: Continue current diet, Start ONS(Continue Dental soft diet.   Start wound healing ONS BID)  Nutritional Goals: po intake > 75% of meals and supplements

## 2020-01-07 NOTE — DISCHARGE INSTR - COC
Isolation/Infection:   Isolation          No Isolation        Patient Infection Status     None to display          Nurse Assessment:  Last Vital Signs: BP (!) 138/59   Pulse 73   Temp 98.1 °F (36.7 °C) (Oral)   Resp 18   Ht 5' 1\" (1.549 m)   Wt 144 lb 11.2 oz (65.6 kg)   SpO2 94%   BMI 27.34 kg/m²     Last documented pain score (0-10 scale): Pain Level: 0  Last Weight:   Wt Readings from Last 1 Encounters:   01/04/20 144 lb 11.2 oz (65.6 kg)     Mental Status:  oriented and alert    IV Access:  - None    Nursing Mobility/ADLs:  Walking   Assisted  Transfer  Assisted  Bathing  Assisted  Dressing  Assisted  Toileting  Independent  Feeding  Independent  Med Admin  Assisted  Med Delivery   whole    Wound Care Documentation and Therapy:        Elimination:  Continence:   · Bowel: Yes  · Bladder: Yes  Urinary Catheter: None   Colostomy/Ileostomy/Ileal Conduit: No       Date of Last BM: 1/8/20    Intake/Output Summary (Last 24 hours) at 1/7/2020 1515  Last data filed at 1/7/2020 0934  Gross per 24 hour   Intake 780 ml   Output 3350 ml   Net -2570 ml     I/O last 3 completed shifts: In: 80 [P.O.:780]  Out: 3350 [Urine:3350]    Safety Concerns:     None    Impairments/Disabilities:      [place] speaking preferred    Nutrition Therapy:  Current Nutrition Therapy:   - Oral Diet:  General dental soft    Routes of Feeding: Oral  Liquids: Thin Liquids  Daily Fluid Restriction: no  Last Modified Barium Swallow with Video (Video Swallowing Test): not done    Treatments at the Time of Hospital Discharge:   Respiratory Treatments:   Oxygen Therapy:  is not on home oxygen therapy. Ventilator:    - No ventilator support    Rehab Therapies: Physical Therapy and Occupational Therapy  Weight Bearing Status/Restrictions: No weight bearing restirctions  Other Medical Equipment (for information only, NOT a DME order):    Other Treatments:     Patient's personal belongings (please select all that are sent with patient):  None    RN SIGNATURE:  Electronically signed by Pérez Coon RN on 1/9/20 at 11:27 AM    CASE MANAGEMENT/SOCIAL WORK SECTION    Inpatient Status Date: 1-1-2020    Readmission Risk Assessment Score:  Readmission Risk              Risk of Unplanned Readmission:        23           Discharging to Facility/ Agency   · Name:   · Address:  · Phone:  · Fax:    Dialysis Facility (if applicable)   · Name:  · Address:  · Dialysis Schedule:  · Phone:  · Fax:    / signature: Electronically signed by FRANCISCO Little on 1/7/20 at 3:34 PM    PHYSICIAN SECTION    Prognosis: {Prognosis:6471501921}    Condition at Discharge: Ray Hull Patient Condition:[id number]}    Rehab Potential (if transferring to Rehab): {Prognosis:5765313367}    Recommended Labs or Other Treatments After Discharge: ***    Physician Certification: I certify the above information and transfer of Jl Bass  is necessary for the continuing treatment of the diagnosis listed and that she requires {Admit to Appropriate Level of Care:16271} for {GREATER/LESS:610630032} 30 days.      Update Admission H&P: {CHP DME Changes in [facility]:311644459}    PHYSICIAN SIGNATURE:  Electronically signed by Billy Feliz MD on 1/7/20 at 3:15 PM

## 2020-01-07 NOTE — FLOWSHEET NOTE
1922:  Bedside report received from [facility]. Patient stable, able to voice needs, speaks mainly [place] with some [place]. Waiting for transfer to 2W in morning, will continue to monitor during shift. 0730: Bedside report given to Oneil Doll 1933. Patient stable during night with no changes in status. See flowsheet for documentation. Will transfer this morning.

## 2020-01-07 NOTE — FLOWSHEET NOTE
7a-7p Summary    Pt alert and oriented. Up to bathroom with standby assistance. Pt went for EGD today and tolerated procedure well. Dr Trujillo Client discussed findings with family. Pt is in to transfer and may go to to either [place] [id number] or [address] [place] [address].  Dressing to head changed and patient tolerated well

## 2020-01-07 NOTE — PROGRESS NOTES
Neurology Daily Progress Note  Name: Jl Bass  Age: 69 y.o. Gender: female  CodeStatus: Full Code  Allergies: Benadryl [Diphenhydramine]  Chlorhexidine    Chief Complaint:Altered Mental Status    Primary Care Provider: Beulah Blancas DO  InpatientTreatment Team: Treatment Team: Attending Provider: Samir Delgado MD; Consulting Physician: Betsy Agosto MD; Consulting Physician: Jace Bales MD; Consulting Physician: Allen Roberts MD; Surgeon: Betsy Agosto MD; : oRjelio Tamayo RN; Patient Care Tech: Vernal Radon; Patient Care Tech: Tara Nine; Registered Nurse: Nicole Eckert RN  Admission Date: 1/1/2020      HPI   Pt seen and examined for neuro follow up for 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 Pt speaks mostly [place]. Daughter at bedside. Expressive aphasia somewhat improved. No other focal deficits. Denies Headache, double vision, blurry vision, difficulty with speech, difficulty with swallowing, weakness, numbness, pain, nausea, vomiting, choking, neck pain, dizziness  Trnaslation is  obtained from the daughter was at the bedside. Actually doing quite well and has not any difficulty swallowing or choking she denies any symptoms at this time. She is just weak and we awaited transfer to regular medical floor. BP (!) 145/59   Pulse 73   Temp 98.2 °F (36.8 °C)   Resp 17   Ht 5' 1\" (1.549 m)   Wt 144 lb 11.2 oz (65.6 kg)   SpO2 94%   BMI 27.34 kg/m²                                   Review of Systems   Constitutional: Negative for appetite change, chills, fatigue and fever. HENT: Negative for hearing loss and trouble swallowing. Eyes: Negative for visual disturbance. Respiratory: Negative for cough, chest tightness, shortness of breath and wheezing. Cardiovascular: Negative for chest pain, palpitations and leg swelling. Gastrointestinal: Positive for diarrhea. Negative for constipation, nausea and vomiting.    Skin: Negative for color change and frontal lobe, left parietal lobe   and left basal ganglia/temporal lobe, without evidence of restricted diffusion, greater than expected. Findings could reflect extensive edema from the mass/malignancy. Other etiologies are not excluded. . Neurosurgical/neurological evaluation recommended. 2. Left to right subfalcine herniation of 0.56 cm. Neurosurgical consultation and evaluation is recommended. 3. Dominant left vertebral artery         No results found for this or any previous visit. MRA of the Head and Neck: No results found for this or any previous visit. No results found for this or any previous visit. Results for orders placed during the hospital encounter of 01/01/20   MRA HEAD WO CONTRAST    Narrative EXAM: MR angiography of the head without contrast.    History: Meningioma    Technique: Multiplanar multisequence MRI of the brain was performed. 3-D time-of-flight axial images were performed through the [facility] [place]. 3-D volume rendered images of the [facility] [place] performed. Comparison: MRI of the brain from January 1, 2000    Findings: The bilateral distal cervical internal carotid arteries through the skull base are patent. The bilateral middle cerebral arteries through the trifurcation and opercular branches are patent. The vertebrobasilar system including the superior cerebellar and posterior cerebral arteries are patent. Left vertebral artery is dominant. Posterior communicating arteries are patent. The bilateral anterior cerebral arteries and anterior communicating artery are patent. No aneurysm or high-grade stenosis of the visualized cerebral vasculature. No significant interval change in edema of the left frontal, parietal, and temporal lobes. No significant interval change of 6 mm of rightward midline shift. Again identified is a 2.3 cm mass within the anterior left middle cranial fossa as detailed on   recent MRI of the brain.       Impression infarction/accident with edema and mass effect. Further evaluation MRI of the brain with and without IV contrast is recommended. 2. Vascular calcifications within the bilateral internal carotid artery cavernous segments and the vertebral arteries. No results found for this or any previous visit. No results found for this or any previous visit. Carotid duplex: No results found for this or any previous visit. No results found for this or any previous visit. No results found for this or any previous visit. Echo No results found for this or any previous visit. Assessment/Plan:  Left cerebral tumor with cerebral edema/patient is in the intensive care unit and status post surgery. Patient had a left craniotomy. She is much more awake. She does have some language issues some of it may be aphasia but most of this appears to be her inability to speak the language. She has a mild headache. No seizures are reported and there is no focal weakness. The tumor may suggest a meningioma. Expressive aphasia  We will continue to keep observation of her findings. She will continue on Decadron and Keppra for now. Hx kidney transplant on immunosuppressive drugs  Decadron  Keppra for seizure prophylaxis  We were called yesterday for double vision which appeared to be monocular I truly not feel any disconjugate gaze and she is not complaining of any symptoms at this time. The daughter is at the bedside was able to translate in [place] and she still has some word finding difficulty speech difficulty which she has had previously prior to admission likely from the tumor. We did reassure her that this is a transient symptoms are likely to occur with swelling. No clinical changes notable since the last examination. The word finding difficulty may be part of her language though she may have some sustained mild sensory aphasia. Deandre Chin MD, [address], [address] [facility] Board of Psychiatry & Neurology  Board Certified in Vascular Neurology  Board Certified in Neuromuscular Medicine  Certified in Neurorehabilitation

## 2020-01-07 NOTE — PROGRESS NOTES
Current Facility-Administered Medications   Medication Dose Route Frequency Provider Last Rate Last Dose    dexamethasone (DECADRON) 1 MG/ML solution 4 mg  4 mg Oral BID Delgado Howe MD        [START ON 1/11/2020] dexamethasone (DECADRON) 1 MG/ML solution 4 mg  4 mg Oral Daily Delgado Howe MD        lactated ringers infusion   Intravenous Continuous Gerhardt Perks, MD        metoprolol tartrate (LOPRESSOR) tablet 25 mg  25 mg Oral BID Rayshawn Gift Bescak, DO   25 mg at 01/07/20 0837    amLODIPine (NORVASC) tablet 5 mg  5 mg Oral Daily Rayshawn Gift Bescak, DO   5 mg at 01/07/20 0836    pantoprazole (PROTONIX) tablet 20 mg  20 mg Oral QAM AC [place] Gift Bescak, DO   20 mg at 01/07/20 4072    cinacalcet (SENSIPAR) tablet 30 mg  30 mg Oral Daily Rayshawn Gift Bescak, DO   30 mg at 01/07/20 2428    traMADol (ULTRAM) tablet 50 mg  50 mg Oral Q6H PRN Delgado Howe MD   50 mg at 01/05/20 0444    0.9 % sodium chloride infusion   Intravenous Continuous Shyann Tompkins MD   Stopped at 01/03/20 2120    0.9 % sodium chloride infusion 250 mL  250 mL Intravenous Once Delgado Howe MD   Stopped at 01/03/20 2314    predniSONE (DELTASONE) tablet 5 mg  5 mg Oral Daily Delgado Howe MD   5 mg at 01/07/20 0837    0.9 % sodium chloride infusion   Intravenous Continuous Delgado Howe MD   Stopped at 01/04/20 1008    sodium chloride flush 0.9 % injection 10 mL  10 mL Intravenous 2 times per day Delgado Howe MD   10 mL at 01/07/20 0841    sodium chloride flush 0.9 % injection 10 mL  10 mL Intravenous PRN Delgado oHwe MD        HYDROmorphone (DILAUDID) injection 0.25 mg  0.25 mg Intravenous Q3H PRN Delgado Howe MD   0.25 mg at 01/04/20 1332    Or    HYDROmorphone (DILAUDID) injection 0.5 mg  0.5 mg Intravenous Q3H PRN Delgado Howe MD   0.5 mg at 01/04/20 0644    docusate sodium (COLACE) capsule 100 mg  100 mg Oral BID Delgado Howe MD   100 mg at 01/07/20 0837    ondansetron (ZOFRAN) injection 4 mg  4 mg Intravenous Q6H PRN Delgado Howe MD   4 mg at 01/07/20 0207    labetalol (NORMODYNE;TRANDATE) injection syringe 10 mg  10 mg Intravenous Q2H PRN Arabella Vivar MD   10 mg at 01/04/20 0000    hydrALAZINE (APRESOLINE) injection 10 mg  10 mg Intravenous Q2H PRN Arabella Vivar MD   10 mg at 01/07/20 0158    levETIRAcetam (KEPPRA) tablet 750 mg  750 mg Oral BID Mike Machado MD   750 mg at 01/07/20 0836    sodium chloride flush 0.9 % injection 10 mL  10 mL Intravenous BID Justice Arellano MD   10 mL at 01/07/20 0841    sodium chloride flush 0.9 % injection 10 mL  10 mL Intravenous 2 times per day RAFAL Chand   10 mL at 01/07/20 0841    sodium chloride flush 0.9 % injection 10 mL  10 mL Intravenous PRN Albina Baldwin, [address]        magnesium hydroxide (MILK OF MAGNESIA) 400 MG/5ML suspension 30 mL  30 mL Oral Daily PRN RAFAL Chand        enoxaparin (LOVENOX) injection 40 mg  40 mg Subcutaneous Daily RAFAL Chand   40 mg at 01/07/20 0454    LORazepam (ATIVAN) injection 2 mg  2 mg Intravenous Q6H PRN Albina Baldwin, [address]        sulfamethoxazole-trimethoprim (BACTRIM;SEPTRA) 400-80 MG per tablet 1 tablet  1 tablet Oral Daily Gurdeep Burnham MD   1 tablet at 01/07/20 0915    mycophenolate (CELLCEPT) capsule 500 mg  500 mg Oral BID Gurdeep Burnham MD   500 mg at 01/07/20 7225    DULoxetine (CYMBALTA) extended release capsule 30 mg  30 mg Oral Daily Gurdeep Burnham MD   30 mg at 01/07/20 0836    tacrolimus (PROGRAF) capsule 3 mg  3 mg Oral QAM Gurdeep Burnham MD   3 mg at 01/07/20 9933    tacrolimus (PROGRAF) capsule 2 mg  2 mg Oral QPM Gurdeep Burnham MD   2 mg at 01/06/20 1742     Allergies   Allergen Reactions    Benadryl [Diphenhydramine]      Unknown      Chlorhexidine      unknown     Active Problems:    Brain neoplasm (HCC)    Generalized seizure ([address]    Aphasia  Resolved Problems:    * No resolved hospital problems.  *    Blood pressure (!) 138/59, pulse 73, temperature 98.1 °F (36.7 °C), temperature source Oral, resp. rate 18, height 5' 1\" (1.549 m), weight 144 lb 11.2 oz (65.6 kg), SpO2 94 %. Subjective:  Symptoms:  She reports malaise and weakness. No shortness of breath, cough, chest pain, headache, chest pressure, anorexia, diarrhea or anxiety. Diet:  No nausea or vomiting. Objective:  General Appearance:  Well-appearing, comfortable and in no acute distress. Vital signs: (most recent): Blood pressure (!) 138/59, pulse 73, temperature 98.1 °F (36.7 °C), temperature source Oral, resp. rate 18, height 5' 1\" (1.549 m), weight 144 lb 11.2 oz (65.6 kg), SpO2 94 %. HEENT: Normal HEENT exam.    Lungs:  Normal effort. Heart: Normal rate. Regular rhythm. S1 normal.    Abdomen: Abdomen is soft. Bowel sounds are normal.   There is no epigastric area or suprapubic area tenderness. Pulses: Distal pulses are intact. Neurological: Patient is alert. Pupils:  Pupils are equal, round, and reactive to light. Skin:  Warm and dry. Lab Results   Component Value Date    WBC 4.1 (L) 01/07/2020    HGB 10.1 (L) 01/07/2020    HCT 31.0 (L) 01/07/2020    MCV 92.4 01/07/2020     01/07/2020     Lab Results   Component Value Date     01/07/2020    K 4.4 01/07/2020     01/07/2020    CO2 24 01/07/2020    BUN 30 01/07/2020    CREATININE 0.99 01/07/2020    GLUCOSE 126 01/07/2020    CALCIUM 8.9 01/07/2020      Past Medical History:   Diagnosis Date    Anxiety     Cardiomegaly     Chronic kidney disease     Colon polyps     Depression     Gallbladder polyp     GERD (gastroesophageal reflux disease)     Gout     Gout     Hypertension     Hyperuricemia     Hypothyroid     Vitamin D deficiency        Assessment & Plan  1) . Left temporal lobe malignancy with severe reactive edema. S/p resection  Most likey meningioma  Tolerated the surgery well  Transfer to  today if cleared by neurosurg  2.  3/3/2018 kidney transplant on immunosuppressive drugs.  [facility] already aware,

## 2020-01-07 NOTE — PLAN OF CARE
Brief Note:  Renal function remains stable  Have not had to adjust IS for up to a week  BP improving on current regiment    Will sign off   Please contact us if any issues arise   Continue current IS and BP medication regimen    Nelly Oliveros MD

## 2020-01-07 NOTE — FLOWSHEET NOTE
Patient arrived to unit room 221 from ICU, assessment complete, patient awake and alert; oriented to person, place, and time, patient mainly [place] speaking, patient rates pain a zero, no nausea and no vomiting, no chest pain or shortness of breath, oriented to room and call system, call light in reach, bed in lowest position, bed alarm engaged, will continue to monitor.

## 2020-01-07 NOTE — PROGRESS NOTES
hydrALAZINE (APRESOLINE) injection 10 mg  10 mg Intravenous Q2H PRN Hilda Hearn MD   10 mg at 01/06/20 0626    levETIRAcetam (KEPPRA) tablet 750 mg  750 mg Oral BID Bronwyn Adair MD   750 mg at 01/06/20 0847    dexamethasone (DECADRON) tablet 4 mg  4 mg Oral 3 times per day Bronwyn Adair MD   4 mg at 01/06/20 1522    sodium chloride flush 0.9 % injection 10 mL  10 mL Intravenous BID Babatunde Saavedra MD   10 mL at 01/06/20 0853    sodium chloride flush 0.9 % injection 10 mL  10 mL Intravenous 2 times per day RAFAL Banks   10 mL at 01/06/20 9454    sodium chloride flush 0.9 % injection 10 mL  10 mL Intravenous PRN Gerardo Baldwin [place]        magnesium hydroxide (MILK OF MAGNESIA) 400 MG/5ML suspension 30 mL  30 mL Oral Daily PRN RAFAL Bnaks        enoxaparin (LOVENOX) injection 40 mg  40 mg Subcutaneous Daily [facility]   Stopped at 01/06/20 6760    LORazepam (ATIVAN) injection 2 mg  2 mg Intravenous Q6H PRN RAFAL Banks        sulfamethoxazole-trimethoprim (BACTRIM;SEPTRA) 400-80 MG per tablet 1 tablet  1 tablet Oral Daily Александр Gibson MD   1 tablet at 01/06/20 0847    mycophenolate (CELLCEPT) capsule 500 mg  500 mg Oral BID Александр Gibson MD   500 mg at 01/06/20 0848    DULoxetine (CYMBALTA) extended release capsule 30 mg  30 mg Oral Daily Александр Gibson MD   30 mg at 01/06/20 0847    tacrolimus (PROGRAF) capsule 3 mg  3 mg Oral QAM Александр Gibson MD   3 mg at 01/06/20 0849    tacrolimus (PROGRAF) capsule 2 mg  2 mg Oral QPM Александр Gibson MD   2 mg at 01/06/20 1742     Allergies   Allergen Reactions    Benadryl [Diphenhydramine]      Unknown      Chlorhexidine      unknown     Active Problems:    Brain neoplasm (HCC)    Generalized seizure ([address]    Aphasia  Resolved Problems:    * No resolved hospital problems. *    Blood pressure (!) 189/70, pulse 66, temperature 98.3 °F (36.8 °C), temperature source Oral, resp.  rate 25, height 5'

## 2020-01-08 PROBLEM — G89.4 CHRONIC PAIN DISORDER: Status: ACTIVE | Noted: 2017-06-20

## 2020-01-08 PROBLEM — B02.29 POSTHERPETIC NEURALGIA: Status: ACTIVE | Noted: 2018-09-19

## 2020-01-08 PROBLEM — Z86.0100 HISTORY OF COLONIC POLYPS: Status: ACTIVE | Noted: 2017-09-26

## 2020-01-08 PROBLEM — R10.13 EPIGASTRIC ABDOMINAL PAIN: Status: ACTIVE | Noted: 2018-04-05

## 2020-01-08 PROBLEM — Z94.0 IMMUNOSUPPRESSIVE MANAGEMENT ENCOUNTER FOLLOWING KIDNEY TRANSPLANT: Status: ACTIVE | Noted: 2018-04-02

## 2020-01-08 PROBLEM — F33.1 DEPRESSION, MAJOR, RECURRENT, MODERATE (HCC): Status: ACTIVE | Noted: 2018-10-08

## 2020-01-08 PROBLEM — Z79.899 IMMUNOSUPPRESSIVE MANAGEMENT ENCOUNTER FOLLOWING KIDNEY TRANSPLANT: Status: ACTIVE | Noted: 2018-04-02

## 2020-01-08 PROBLEM — Z86.010 HISTORY OF COLONIC POLYPS: Status: ACTIVE | Noted: 2017-09-26

## 2020-01-08 PROBLEM — M54.6 PAIN IN THORACIC SPINE: Status: ACTIVE | Noted: 2018-07-15

## 2020-01-08 PROBLEM — F41.9 ANXIETY: Status: ACTIVE | Noted: 2018-07-20

## 2020-01-08 PROBLEM — K22.9 ESOPHAGEAL DISORDER: Status: ACTIVE | Noted: 2018-07-20

## 2020-01-08 LAB
ANION GAP SERPL CALCULATED.3IONS-SCNC: 12 MEQ/L (ref 9–15)
BASOPHILS ABSOLUTE: 0 K/UL (ref 0–0.2)
BASOPHILS RELATIVE PERCENT: 0.5 %
BUN BLDV-MCNC: 35 MG/DL (ref 8–23)
CALCIUM SERPL-MCNC: 9.3 MG/DL (ref 8.5–9.9)
CHLORIDE BLD-SCNC: 101 MEQ/L (ref 95–107)
CO2: 23 MEQ/L (ref 20–31)
CREAT SERPL-MCNC: 0.9 MG/DL (ref 0.5–0.9)
EOSINOPHILS ABSOLUTE: 0 K/UL (ref 0–0.7)
EOSINOPHILS RELATIVE PERCENT: 0 %
GFR AFRICAN AMERICAN: >60
GFR NON-AFRICAN AMERICAN: >60
GLUCOSE BLD-MCNC: 123 MG/DL (ref 70–99)
HCT VFR BLD CALC: 32.3 % (ref 37–47)
HEMOGLOBIN: 10.4 G/DL (ref 12–16)
LACTIC ACID: 1.4 MMOL/L (ref 0.5–2.2)
LYMPHOCYTES ABSOLUTE: 0.3 K/UL (ref 1–4.8)
LYMPHOCYTES RELATIVE PERCENT: 7.2 %
MCH RBC QN AUTO: 29.7 PG (ref 27–31.3)
MCHC RBC AUTO-ENTMCNC: 32.3 % (ref 33–37)
MCV RBC AUTO: 92 FL (ref 82–100)
MONOCYTES ABSOLUTE: 0.4 K/UL (ref 0.2–0.8)
MONOCYTES RELATIVE PERCENT: 10.1 %
NEUTROPHILS ABSOLUTE: 3.2 K/UL (ref 1.4–6.5)
NEUTROPHILS RELATIVE PERCENT: 82.2 %
PDW BLD-RTO: 13.7 % (ref 11.5–14.5)
PLATELET # BLD: 166 K/UL (ref 130–400)
POTASSIUM REFLEX MAGNESIUM: 4.6 MEQ/L (ref 3.4–4.9)
RBC # BLD: 3.51 M/UL (ref 4.2–5.4)
SODIUM BLD-SCNC: 136 MEQ/L (ref 135–144)
WBC # BLD: 3.9 K/UL (ref 4.8–10.8)

## 2020-01-08 PROCEDURE — 92523 SPEECH SOUND LANG COMPREHEN: CPT

## 2020-01-08 PROCEDURE — 97165 OT EVAL LOW COMPLEX 30 MIN: CPT

## 2020-01-08 PROCEDURE — 1210000000 HC MED SURG R&B

## 2020-01-08 PROCEDURE — 6370000000 HC RX 637 (ALT 250 FOR IP): Performed by: INTERNAL MEDICINE

## 2020-01-08 PROCEDURE — 99222 1ST HOSP IP/OBS MODERATE 55: CPT | Performed by: PHYSICAL MEDICINE & REHABILITATION

## 2020-01-08 PROCEDURE — 36415 COLL VENOUS BLD VENIPUNCTURE: CPT

## 2020-01-08 PROCEDURE — 99233 SBSQ HOSP IP/OBS HIGH 50: CPT | Performed by: PSYCHIATRY & NEUROLOGY

## 2020-01-08 PROCEDURE — 6360000002 HC RX W HCPCS: Performed by: PHYSICIAN ASSISTANT

## 2020-01-08 PROCEDURE — 80048 BASIC METABOLIC PNL TOTAL CA: CPT

## 2020-01-08 PROCEDURE — 6370000000 HC RX 637 (ALT 250 FOR IP): Performed by: NEUROLOGICAL SURGERY

## 2020-01-08 PROCEDURE — 97162 PT EVAL MOD COMPLEX 30 MIN: CPT

## 2020-01-08 PROCEDURE — 2580000003 HC RX 258: Performed by: NEUROLOGICAL SURGERY

## 2020-01-08 PROCEDURE — 6360000002 HC RX W HCPCS: Performed by: NEUROLOGICAL SURGERY

## 2020-01-08 PROCEDURE — 83605 ASSAY OF LACTIC ACID: CPT

## 2020-01-08 PROCEDURE — 85025 COMPLETE CBC W/AUTO DIFF WBC: CPT

## 2020-01-08 PROCEDURE — 6370000000 HC RX 637 (ALT 250 FOR IP): Performed by: PSYCHIATRY & NEUROLOGY

## 2020-01-08 RX ADMIN — PANTOPRAZOLE SODIUM 20 MG: 20 TABLET, DELAYED RELEASE ORAL at 05:17

## 2020-01-08 RX ADMIN — DOCUSATE SODIUM 100 MG: 100 CAPSULE, LIQUID FILLED ORAL at 10:14

## 2020-01-08 RX ADMIN — PREDNISONE 5 MG: 2.5 TABLET ORAL at 10:14

## 2020-01-08 RX ADMIN — METOPROLOL TARTRATE 25 MG: 25 TABLET ORAL at 10:13

## 2020-01-08 RX ADMIN — DEXAMETHASONE INTENSOL 4 MG: 1 SOLUTION, CONCENTRATE ORAL at 10:15

## 2020-01-08 RX ADMIN — ENOXAPARIN SODIUM 40 MG: 40 INJECTION SUBCUTANEOUS at 10:13

## 2020-01-08 RX ADMIN — DULOXETINE HYDROCHLORIDE 30 MG: 30 CAPSULE, DELAYED RELEASE ORAL at 10:14

## 2020-01-08 RX ADMIN — MYCOPHENOLATE MOFETIL 500 MG: 250 CAPSULE ORAL at 10:20

## 2020-01-08 RX ADMIN — DEXAMETHASONE INTENSOL 4 MG: 1 SOLUTION, CONCENTRATE ORAL at 21:20

## 2020-01-08 RX ADMIN — TACROLIMUS 3 MG: 1 CAPSULE ORAL at 10:14

## 2020-01-08 RX ADMIN — AMLODIPINE BESYLATE 5 MG: 5 TABLET ORAL at 10:14

## 2020-01-08 RX ADMIN — MYCOPHENOLATE MOFETIL 500 MG: 250 CAPSULE ORAL at 21:21

## 2020-01-08 RX ADMIN — LEVETIRACETAM 750 MG: 500 TABLET, FILM COATED ORAL at 21:19

## 2020-01-08 RX ADMIN — SULFAMETHOXAZOLE AND TRIMETHOPRIM 1 TABLET: 400; 80 TABLET ORAL at 10:14

## 2020-01-08 RX ADMIN — TACROLIMUS 2 MG: 1 CAPSULE ORAL at 21:20

## 2020-01-08 RX ADMIN — Medication 10 ML: at 10:20

## 2020-01-08 RX ADMIN — METOPROLOL TARTRATE 25 MG: 25 TABLET ORAL at 21:19

## 2020-01-08 RX ADMIN — DOCUSATE SODIUM 100 MG: 100 CAPSULE, LIQUID FILLED ORAL at 21:19

## 2020-01-08 RX ADMIN — LEVETIRACETAM 750 MG: 500 TABLET, FILM COATED ORAL at 10:14

## 2020-01-08 RX ADMIN — CINACALCET 30 MG: 30 TABLET, FILM COATED ORAL at 10:13

## 2020-01-08 RX ADMIN — Medication 10 ML: at 21:22

## 2020-01-08 SDOH — SOCIAL STABILITY: SOCIAL INSECURITY
WITHIN THE LAST YEAR, HAVE YOU BEEN KICKED, HIT, SLAPPED, OR OTHERWISE PHYSICALLY HURT BY YOUR PARTNER OR EX-PARTNER?: NO

## 2020-01-08 SDOH — SOCIAL STABILITY: SOCIAL NETWORK: ARE YOU MARRIED, WIDOWED, DIVORCED, SEPARATED, NEVER MARRIED, OR LIVING WITH A PARTNER?: WIDOWED

## 2020-01-08 SDOH — SOCIAL STABILITY: SOCIAL NETWORK
DO YOU BELONG TO ANY CLUBS OR ORGANIZATIONS SUCH AS CHURCH GROUPS UNIONS, FRATERNAL OR ATHLETIC GROUPS, OR SCHOOL GROUPS?: NO

## 2020-01-08 SDOH — ECONOMIC STABILITY: FOOD INSECURITY: WITHIN THE PAST 12 MONTHS, THE FOOD YOU BOUGHT JUST DIDN'T LAST AND YOU DIDN'T HAVE MONEY TO GET MORE.: NEVER TRUE

## 2020-01-08 SDOH — SOCIAL STABILITY: SOCIAL NETWORK
IN A TYPICAL WEEK, HOW MANY TIMES DO YOU TALK ON THE PHONE WITH FAMILY, FRIENDS, OR NEIGHBORS?: MORE THAN THREE TIMES A WEEK

## 2020-01-08 SDOH — SOCIAL STABILITY: SOCIAL NETWORK: HOW OFTEN DO YOU GET TOGETHER WITH FRIENDS OR RELATIVES?: MORE THAN THREE TIMES A WEEK

## 2020-01-08 SDOH — SOCIAL STABILITY: SOCIAL INSECURITY: WITHIN THE LAST YEAR, HAVE YOU BEEN HUMILIATED OR EMOTIONALLY ABUSED IN OTHER WAYS BY YOUR PARTNER OR EX-PARTNER?: NO

## 2020-01-08 SDOH — SOCIAL STABILITY: SOCIAL INSECURITY: WITHIN THE LAST YEAR, HAVE YOU BEEN AFRAID OF YOUR PARTNER OR EX-PARTNER?: NO

## 2020-01-08 SDOH — ECONOMIC STABILITY: TRANSPORTATION INSECURITY
IN THE PAST 12 MONTHS, HAS THE LACK OF TRANSPORTATION KEPT YOU FROM MEDICAL APPOINTMENTS OR FROM GETTING MEDICATIONS?: NO

## 2020-01-08 SDOH — ECONOMIC STABILITY: TRANSPORTATION INSECURITY
IN THE PAST 12 MONTHS, HAS LACK OF TRANSPORTATION KEPT YOU FROM MEETINGS, WORK, OR FROM GETTING THINGS NEEDED FOR DAILY LIVING?: NO

## 2020-01-08 SDOH — ECONOMIC STABILITY: INCOME INSECURITY: HOW HARD IS IT FOR YOU TO PAY FOR THE VERY BASICS LIKE FOOD, HOUSING, MEDICAL CARE, AND HEATING?: NOT VERY HARD

## 2020-01-08 SDOH — SOCIAL STABILITY: SOCIAL INSECURITY
WITHIN THE LAST YEAR, HAVE TO BEEN RAPED OR FORCED TO HAVE ANY KIND OF SEXUAL ACTIVITY BY YOUR PARTNER OR EX-PARTNER?: NO

## 2020-01-08 SDOH — HEALTH STABILITY: PHYSICAL HEALTH: ON AVERAGE, HOW MANY DAYS PER WEEK DO YOU ENGAGE IN MODERATE TO STRENUOUS EXERCISE (LIKE A BRISK WALK)?: 0 DAYS

## 2020-01-08 SDOH — HEALTH STABILITY: MENTAL HEALTH
STRESS IS WHEN SOMEONE FEELS TENSE, NERVOUS, ANXIOUS, OR CAN'T SLEEP AT NIGHT BECAUSE THEIR MIND IS TROUBLED. HOW STRESSED ARE YOU?: ONLY A LITTLE

## 2020-01-08 SDOH — HEALTH STABILITY: PHYSICAL HEALTH: ON AVERAGE, HOW MANY MINUTES DO YOU ENGAGE IN EXERCISE AT THIS LEVEL?: 0 MIN

## 2020-01-08 SDOH — SOCIAL STABILITY: SOCIAL NETWORK: HOW OFTEN DO YOU ATTEND CHURCH OR RELIGIOUS SERVICES?: MORE THAN 4 TIMES PER YEAR

## 2020-01-08 SDOH — SOCIAL STABILITY: SOCIAL NETWORK: HOW OFTEN DO YOU ATTENT MEETINGS OF THE CLUB OR ORGANIZATION YOU BELONG TO?: NEVER

## 2020-01-08 SDOH — ECONOMIC STABILITY: FOOD INSECURITY: WITHIN THE PAST 12 MONTHS, YOU WORRIED THAT YOUR FOOD WOULD RUN OUT BEFORE YOU GOT MONEY TO BUY MORE.: NEVER TRUE

## 2020-01-08 ASSESSMENT — ENCOUNTER SYMPTOMS
CONSTIPATION: 1
COUGH: 0
DIARRHEA: 0
NAUSEA: 0
STRIDOR: 0
SWOLLEN GLANDS: 0
ABDOMINAL PAIN: 0
SHORTNESS OF BREATH: 1
SHORTNESS OF BREATH: 0
VOMITING: 0
PHOTOPHOBIA: 0
TROUBLE SWALLOWING: 0
COLOR CHANGE: 0
EYE REDNESS: 0
CHEST TIGHTNESS: 0
BLOOD IN STOOL: 0
WHEEZING: 0
SORE THROAT: 0
VISUAL CHANGE: 1
EYE PAIN: 0
BACK PAIN: 1

## 2020-01-08 ASSESSMENT — PAIN SCALES - GENERAL
PAINLEVEL_OUTOF10: 0

## 2020-01-08 NOTE — CONSULTS
herniation of 0.56 cm. Findings are concerning for potential underlying mass/malignancy with extensive edema and mass effect versus cerebrovascular infarction/accident with edema and mass effect. Further evaluation MRI of the brain with and without IV contrast is recommended. 2. Vascular calcifications within the bilateral internal carotid artery cavernous segments and the vertebral arteries. Ct Chest Wo Contrast    Result Date: 1/4/2020  EXAMINATION:  CT SCAN CHEST. CLINICAL HISTORY:  Abnormal chest x-ray COMPARISON:  None TECHNIQUE:  Multiple serial axial images of the chest from the base neck through the upper abdomen with both sagittal coronal reconstruction was performed without intravenous or oral administration of contrast. FINDINGS:  There is a patchy mosaic appearance lung parenchyma that can be seen with small airway disease. The nodular density seen on the plain film may correspond to an area of atelectasis, scarring at the base of the left upper lobe extending to the pleura. No focal parenchymal abnormalities. There is an area of atelectasis, scarring base left lower lobe. No additional focal parenchymal abnormalities or pleural effusions or pneumothoraces. No significant periaortic, pretracheal, parahilar or subcarinal adenopathy. The esophagus is noted to be mildly ectatic in its proximal third. There is there is some associated dilatation of the proximal third with debris noted. Findings may represent an area of impaction. No surrounding extraluminal air or fluid is noted within  the region to suggest darline perforation. . An esophageal diverticulum was not excluded this point. The field-of-view visualized abdominal contents show partially visualized images of the kidneys which suggest atrophy. There are bilateral areas low-attenuation the largest the left measures 2.4 cm and the largest on the right which is exophytic at the posterior pole region measures 1.7 cm.  These are of low-attenuation but stridor. Shortness of breath on exertion   Cardiovascular: Negative for chest pain, palpitations and leg swelling. Gastrointestinal: Positive for anorexia and constipation. Negative for abdominal pain, blood in stool, diarrhea, nausea and vomiting. Endocrine: Negative for polydipsia. Genitourinary: Negative for dysuria, flank pain, frequency, hematuria and urgency. Musculoskeletal: Positive for arthralgias, back pain, gait problem and myalgias. Negative for neck pain. Skin: Negative for rash. Allergic/Immunologic: Positive for immunocompromised state. Negative for environmental allergies. Neurological: Positive for vertigo, weakness, headaches and loss of balance. Negative for dizziness, tremors and seizures. Hematological: Does not bruise/bleed easily. Psychiatric/Behavioral: Positive for memory loss. Negative for hallucinations and suicidal ideas. The patient is not nervous/anxious. Physical Exam:    BP (!) 167/74   Pulse 64   Temp 97.7 °F (36.5 °C)   Resp 18   Ht 5' 1\" (1.549 m)   Wt 144 lb 11.2 oz (65.6 kg)   SpO2 96%   BMI 27.34 kg/m²      Physical Exam  Constitutional:       General: She is not in acute distress. Appearance: She is well-developed. She is ill-appearing. She is not toxic-appearing or diaphoretic. HENT:      Head: Normocephalic. Not macrocephalic and not microcephalic. No raccoon eyes or Caraballo's sign. Mouth/Throat:      Pharynx: Oropharyngeal exudate present. Eyes:      General: No scleral icterus. Right eye: No discharge. Left eye: No discharge. Conjunctiva/sclera: Conjunctivae normal.      Pupils: Pupils are equal, round, and reactive to light. Neck:      Musculoskeletal: Normal range of motion. Spinous process tenderness and muscular tenderness present. Thyroid: No thyromegaly. Vascular: Normal carotid pulses. No carotid bruit, hepatojugular reflux or JVD. Trachea: No tracheal deviation. remote memory. Judgment: Judgment is not impulsive or inappropriate. Ortho Exam  Neurologic Exam     Cranial Nerves     CN III, IV, VI   Pupils are equal, round, and reactive to light. Gait, Coordination, and Reflexes     Reflexes   Right brachioradialis: 1+  Left brachioradialis: 1+  Right biceps: 1+  Left biceps: 1+  Right triceps: 1+  Left triceps: 1+  Right patellar: 0  Left patellar: 0  Right achilles: 0  Left achilles: 0            Diagnostics:    Recent Results (from the past 24 hour(s))   Basic Metabolic Panel w/ Reflex to MG    Collection Time: 01/08/20  5:36 AM   Result Value Ref Range    Sodium 136 135 - 144 mEq/L    Potassium reflex Magnesium 4.6 3.4 - 4.9 mEq/L    Chloride 101 95 - 107 mEq/L    CO2 23 20 - 31 mEq/L    Anion Gap 12 9 - 15 mEq/L    Glucose 123 (H) 70 - 99 mg/dL    BUN 35 (H) 8 - 23 mg/dL    CREATININE 0.90 0.50 - 0.90 mg/dL    GFR Non-African American >60.0 >60    GFR  >60.0 >60    Calcium 9.3 8.5 - 9.9 mg/dL   CBC auto differential    Collection Time: 01/08/20  5:36 AM   Result Value Ref Range    WBC 3.9 (L) 4.8 - 10.8 K/uL    RBC 3.51 (L) 4.20 - 5.40 M/uL    Hemoglobin 10.4 (L) 12.0 - 16.0 g/dL    Hematocrit 32.3 (L) 37.0 - 47.0 %    MCV 92.0 82.0 - 100.0 fL    MCH 29.7 27.0 - 31.3 pg    MCHC 32.3 (L) 33.0 - 37.0 %    RDW 13.7 11.5 - 14.5 %    Platelets 255 090 - 013 K/uL    Neutrophils % 82.2 %    Lymphocytes % 7.2 %    Monocytes % 10.1 %    Eosinophils % 0.0 %    Basophils % 0.5 %    Neutrophils Absolute 3.2 1.4 - 6.5 K/uL    Lymphocytes Absolute 0.3 (L) 1.0 - 4.8 K/uL    Monocytes Absolute 0.4 0.2 - 0.8 K/uL    Eosinophils Absolute 0.0 0.0 - 0.7 K/uL    Basophils Absolute 0.0 0.0 - 0.2 K/uL   Lactic acid, plasma    Collection Time: 01/08/20  5:36 AM   Result Value Ref Range    Lactic Acid 1.4 0.5 - 2.2 mmol/L              Impression:    1.  Impaired mobility and ADLs due to  [address] OF LEFT anterior MIDDLE CRANIAL

## 2020-01-08 NOTE — PROGRESS NOTES
of devices: All fall risk precautions in place    Subjective:Pt seen alone       Pain Reassessment: 0/10          Prior Level of Function:  Social/Functional History  Lives With: Daughter  Type of Home: House  Home Layout: Multi-level, Bed/Bath upstairs  Home Access: Stairs to enter with rails  Entrance Stairs - Number of Steps: 2  Bathroom Shower/Tub: Tub/Shower unit  Bathroom Equipment: Shower chair  Home Equipment: Rolling walker  ADL Assistance: Independent  Homemaking Assistance: Independent  Homemaking Responsibilities: (Pt reports that she was independent)  Ambulation Assistance: Independent  Transfer Assistance: Independent  Active : No  Additional Comments: has 2 dtrs that stop in daily    OBJECTIVE:     Orientation Status:  Orientation  Overall Orientation Status: Within Functional Limits    Observation:  Observation/Palpation  Posture: Good  Observation: incision on left anterior head with bandage    Cognition Status:  Cognition  Cognition Comment: Following one step commands consistently. Pt speaks [place] and [place]. Increased time for processing due to language barrier    Perception Status:  Perception  Overall Perceptual Status: WFL    Sensation Status:  Sensation  Overall Sensation Status: WFL    Vision and Hearing Status:  Vision  Vision: Impaired  Vision Exceptions: Wears glasses for reading  Hearing  Hearing: Within functional limits     ROM:   LUE AROM (degrees)  LUE AROM : WFL  Left Hand AROM (degrees)  Left Hand AROM: WFL  RUE AROM (degrees)  RUE AROM : WFL  Right Hand AROM (degrees)  Right Hand AROM: WFL    Strength:  LUE Strength  Gross LUE Strength: WFL  L Hand General: 4/5  RUE Strength  Gross RUE Strength: WFL  R Hand General: 4/5    Coordination, Tone, Quality of Movement:    Tone RUE  RUE Tone: Normotonic  Tone LUE  LUE Tone: Normotonic  Coordination  Movements Are Fluid And Coordinated: No  Coordination and Movement description: Tremors, Left UE, Right UE    Hand Dominance:  Hand Dominance  Hand Dominance: Right    ADL Status:  ADL  Feeding: Setup  Grooming: Stand by assistance  UE Bathing: Stand by assistance  LE Bathing: Contact guard assistance  UE Dressing: Setup  LE Dressing: Contact guard assistance  Toileting: Unable to assess(comment)          Functional Mobility:SBA          Bed Mobility  Bed mobility  Supine to Sit: Supervision  Sit to Supine: Supervision    Seated and Standing Balance:  Balance  Sitting Balance: Supervision  Standing Balance: Stand by assistance    Functional Endurance:  Activity Tolerance  Activity Tolerance: Patient Tolerated treatment well    D/C Recommendations:  OT D/C RECOMMENDATIONS  REQUIRES OT FOLLOW UP: Yes    Equipment Recommendations:       OT Education:        OT Follow Up:  OT D/C RECOMMENDATIONS  REQUIRES OT FOLLOW UP: Yes       Assessment/Discharge Disposition:     Performance deficits / Impairments: Decreased ADL status, Decreased balance, Decreased high-level IADLs  Prognosis: Good  Discharge Recommendations: Continue to assess pending progress  Decision Making: Low Complexity  History: 6 complexities  Exam: 3 deficits  Assistance / Modification: CGA    Six Click Score    How much help for putting on and taking off regular lower body clothing?: A Little  How much help for Bathing?: A Little  How much help for Toileting?: None  How much help for putting on and taking off regular upper body clothing?: None  How much help for taking care of personal grooming?: None  How much help for eating meals?: None  AM-PAC Inpatient Daily Activity Raw Score: 22  AM-PAC Inpatient ADL T-Scale Score : 47.1  ADL Inpatient CMS 0-100% Score: 25.8    Plan:  Plan  Times per week: 1-4x/wk  Current Treatment Recommendations: Functional Mobility Training, Balance Training, Neuromuscular Re-education, Self-Care / ADL    Goals:   Patient will:    - Be independent in UB ADLs   - Be independent in LB ADLs  - Be independent in ADL transfers without LOB  - Be independent in toileting tasks  - Access appropriate D/C site with as few architectural barriers as possible. Patient Goal: Patient goals :  To return to home when ready      Discussed and agreed upon: Yes Comments:     Therapy Time:   OT Individual Minutes  Time In: 0900  Time Out: 8753  Minutes: 17      Electronically signed by:    SHANE Velázquez  8/7/2255, 10:13 AM Electronically signed by SHANE Velázquez on 5/7/58 at 10:14 AM

## 2020-01-08 NOTE — FLOWSHEET NOTE
Patient assessment complete, awake and alert; oriented to person, place, and time, [place] speaking mainly, patient denies pain , denies n/v, denies cp & sob, call light in reach, bed in lowest position, bed alarm engaged, will continue to monitor

## 2020-01-08 NOTE — PROGRESS NOTES
Neurology Daily Progress Note  Name: Dustin Barrios  Age: 69 y.o. Gender: female  CodeStatus: Full Code  Allergies: Benadryl [Diphenhydramine]  Chlorhexidine    Chief Complaint:Altered Mental Status    Primary Care Provider: Crystal Thomson DO  InpatientTreatment Team: Treatment Team: Attending Provider: Vincent Vicente MD; Consulting Physician: hSweta Banks MD; Consulting Physician: Marisol Yang MD; Surgeon: Shweta Banks MD; Consulting Physician: Jannet Ann MD; Consulting Physician: Tong Jacques MD; Surgeon: Tong Jacques MD; Registered Nurse: Caprice Evans, DIPAK; : Aries Demarco; : Storm Peck RN  Admission Date: 1/1/2020      HPI Pt seen and examined for neuro follow up for 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 S/p craniotomy on 1/3/19 Pt speaks mostly [place]. Daughter at bedside. Expressive aphasia somewhat improved. No other focal deficits. Denies Headache, double vision, blurry vision, difficulty with speech, difficulty with swallowing, weakness, numbness, pain, nausea, vomiting, choking, neck pain, dizziness  Translation is  obtained from the daughter was at the bedside. No new or acute neuro complaints/concerns  Vitals:    01/08/20 0710   BP: (!) 167/74   Pulse: 64   Resp:    Temp: 97.7 °F (36.5 °C)   SpO2: 96%      Review of Systems   Constitutional: Negative for appetite change, chills, fatigue and fever. HENT: Negative for hearing loss and trouble swallowing. Eyes: Negative for visual disturbance. Respiratory: Negative for cough, chest tightness, shortness of breath and wheezing. Cardiovascular: Negative for chest pain, palpitations and leg swelling. Gastrointestinal: Negative for nausea and vomiting. Musculoskeletal: Negative for gait problem. Skin: Negative for color change and rash. Neurological: Positive for speech difficulty.  Negative for dizziness, tremors, seizures, syncope, facial asymmetry, weakness, left vertebral artery         No results found for this or any previous visit. MRA of the Head and Neck: No results found for this or any previous visit. No results found for this or any previous visit. Results for orders placed during the hospital encounter of 01/01/20   MRA HEAD WO CONTRAST    Narrative EXAM: MR angiography of the head without contrast.    History: Meningioma    Technique: Multiplanar multisequence MRI of the brain was performed. 3-D time-of-flight axial images were performed through the [place]. 3-D volume rendered images of the [place] performed. Comparison: MRI of the brain from January 1, 2000    Findings: The bilateral distal cervical internal carotid arteries through the skull base are patent. The bilateral middle cerebral arteries through the trifurcation and opercular branches are patent. The vertebrobasilar system including the superior cerebellar and posterior cerebral arteries are patent. Left vertebral artery is dominant. Posterior communicating arteries are patent. The bilateral anterior cerebral arteries and anterior communicating artery are patent. No aneurysm or high-grade stenosis of the visualized cerebral vasculature. No significant interval change in edema of the left frontal, parietal, and temporal lobes. No significant interval change of 6 mm of rightward midline shift. Again identified is a 2.3 cm mass within the anterior left middle cranial fossa as detailed on   recent MRI of the brain. Impression No aneurysm or high-grade stenosis, or vascular malformation of the visualized cerebral vasculature. No significant interval change in left frontal, parietal, and temporal lobe edema and 6 mm of rightward midline shift.                              CT of the Head:   Results for orders placed during the hospital encounter of 01/01/20   CT HEAD WO CONTRAST    Narrative CT HEAD WO CONTRAST    CLINICAL HISTORY:

## 2020-01-08 NOTE — CARE COORDINATION
Precert initiated with Leticia Castellon for the acute in pt. Rehab.   Electronically signed by Imtiaz Cortes RN on 1/8/20 at 10:49 AM

## 2020-01-08 NOTE — PROGRESS NOTES
Becki Murcia MD   4 mg at 01/07/20 0207    labetalol (NORMODYNE;TRANDATE) injection syringe 10 mg  10 mg Intravenous Q2H PRN Coby Irizarry MD   10 mg at 01/04/20 0000    hydrALAZINE (APRESOLINE) injection 10 mg  10 mg Intravenous Q2H PRN Coby Irizarry MD   10 mg at 01/07/20 0158    levETIRAcetam (KEPPRA) tablet 750 mg  750 mg Oral BID Chanell Castro MD   750 mg at 01/08/20 1014    sodium chloride flush 0.9 % injection 10 mL  10 mL Intravenous BID Kranthi Mares MD   10 mL at 01/07/20 0841    sodium chloride flush 0.9 % injection 10 mL  10 mL Intravenous 2 times per day RAFAL Frances   10 mL at 01/07/20 0841    sodium chloride flush 0.9 % injection 10 mL  10 mL Intravenous PRN More Hugh [place]        magnesium hydroxide (MILK OF MAGNESIA) 400 MG/5ML suspension 30 mL  30 mL Oral Daily PRN Moreey RAFAL Gonzalez        enoxaparin (LOVENOX) injection 40 mg  40 mg Subcutaneous Daily RAFAL Francse   40 mg at 01/08/20 1013    LORazepam (ATIVAN) injection 2 mg  2 mg Intravenous Q6H PRN RAFAL Frances        sulfamethoxazole-trimethoprim (BACTRIM;SEPTRA) 400-80 MG per tablet 1 tablet  1 tablet Oral Daily Maty Echols MD   1 tablet at 01/08/20 1014    mycophenolate (CELLCEPT) capsule 500 mg  500 mg Oral BID Maty Echols MD   500 mg at 01/08/20 1020    DULoxetine (CYMBALTA) extended release capsule 30 mg  30 mg Oral Daily Maty Echols MD   30 mg at 01/08/20 1014    tacrolimus (PROGRAF) capsule 3 mg  3 mg Oral QAM Maty Echols MD   3 mg at 01/08/20 1014    tacrolimus (PROGRAF) capsule 2 mg  2 mg Oral QPM Maty Echols MD   2 mg at 01/07/20 2218     Allergies   Allergen Reactions    Benadryl [Diphenhydramine]      Unknown      Chlorhexidine      unknown     Principal Problem:    Brain neoplasm [facility])  Active Problems:    End stage kidney disease (HCC)    Essential hypertension    Acid reflux    Gout    Angina, class IV (HCC)    Mixed dyslipidemia    Elevated blood uric acid level    Cerebral malignant neoplasm (HCC)    Generalized seizure (HCC)    Aphasia    Anxiety    Chronic pain disorder    Depression, major, recurrent, moderate (HCC)    Epigastric abdominal pain    Pain in thoracic spine  Resolved Problems:    * No resolved hospital problems. *    Blood pressure (!) 143/68, pulse 73, temperature 98.6 °F (37 °C), temperature source Oral, resp. rate 18, height 5' 1\" (1.549 m), weight 144 lb 11.2 oz (65.6 kg), SpO2 98 %. Subjective:  Symptoms:  She reports malaise and weakness. No shortness of breath, cough, chest pain, headache, chest pressure, anorexia, diarrhea or anxiety. Diet:  No nausea or vomiting. Objective:  General Appearance:  Well-appearing, comfortable and in no acute distress. Vital signs: (most recent): Blood pressure (!) 143/68, pulse 73, temperature 98.6 °F (37 °C), temperature source Oral, resp. rate 18, height 5' 1\" (1.549 m), weight 144 lb 11.2 oz (65.6 kg), SpO2 98 %. HEENT: Normal HEENT exam.    Lungs:  Normal effort. Heart: Normal rate. Regular rhythm. S1 normal.    Abdomen: Abdomen is soft. Bowel sounds are normal.   There is no epigastric area or suprapubic area tenderness. Pulses: Distal pulses are intact. Neurological: Patient is alert. Pupils:  Pupils are equal, round, and reactive to light. Skin:  Warm and dry.       Lab Results   Component Value Date    WBC 3.9 (L) 01/08/2020    HGB 10.4 (L) 01/08/2020    HCT 32.3 (L) 01/08/2020    MCV 92.0 01/08/2020     01/08/2020     Lab Results   Component Value Date     01/08/2020    K 4.6 01/08/2020     01/08/2020    CO2 23 01/08/2020    BUN 35 01/08/2020    CREATININE 0.90 01/08/2020    GLUCOSE 123 01/08/2020    CALCIUM 9.3 01/08/2020      Past Medical History:   Diagnosis Date    Anxiety     Cardiomegaly     Chronic kidney disease     Colon polyps     Depression     Gallbladder polyp     GERD (gastroesophageal reflux disease)     Gout  Gout     Hypertension     Hyperuricemia     Hypothyroid     Vitamin D deficiency        Assessment & Plan  1) . Left temporal lobe malignancy with severe reactive edema. S/p resection  Most likey meningioma  Tolerated the surgery well  Transfer to  today if cleared by neurosurg  2.  3/3/2018 kidney transplant on immunosuppressive drugs. [facility] already aware, recommended to cont same meds  3) awaiting placement to acute rehab.  precert pending.      35 minutes total care time, >1/2 in unit/floor time and care coordination    Sidney Garcia MD  1/8/2020

## 2020-01-08 NOTE — PROGRESS NOTES
Physical Therapy Med Surg Initial Assessment  Facility/Department: [facility]  Room: N221/N221-       NAME: Garnette Meckel  :  (75 y.o.)  MRN: [id number]  CODE STATUS: Full Code    Date of Service: 2020    Patient Diagnosis(es): Brain neoplasm [facility]) [D49.6]   Chief Complaint   Patient presents with    Altered Mental Status     Patient Active Problem List    Diagnosis Date Noted    Generalized seizure ([address]     Aphasia     Cerebral malignant neoplasm ([address] 2020    Brain neoplasm ([address] 2020    Depression, major, recurrent, moderate ([address] 10/08/2018    Postherpetic neuralgia 2018    Anxiety 2018    Esophageal disorder 2018    Pain in thoracic spine 07/15/2018    Epigastric abdominal pain 2018    Immunosuppressive management encounter following kidney transplant 2018    History of colonic polyps 2017    Chronic pain disorder 2017    Acid reflux 2017    Angina, class IV ([address] 2017    Mixed dyslipidemia 2017    Elevated blood uric acid level 2017    Gout 2015    Combined fat and carbohydrate induced hyperlipemia 2015    End stage kidney disease ([address] 10/27/2011    Essential hypertension 10/27/2011        Past Medical History:   Diagnosis Date    Anxiety     Cardiomegaly     Chronic kidney disease     Colon polyps     Depression     Gallbladder polyp     GERD (gastroesophageal reflux disease)     Gout     Gout     Hypertension     Hyperuricemia     Hypothyroid     Vitamin D deficiency      Past Surgical History:   Procedure Laterality Date    CRANIOTOMY N/A 1/3/2020    CRANIOTOMY FOR EXCISION OF BRAIN TUMOR performed by Amanda Ascencio MD at [address] [place] [address] Left     KIDNEY TRANSPLANT  2018    UPPER GASTROINTESTINAL ENDOSCOPY N/A 2020    EGD performed by Jayme Tejada MD at [facility]       Chart Reviewed: Yes  Patient assessed for rehabilitation services?: Yes  Family / Caregiver Present: No  General Comment  Comments: Pt awake in bed, able to converse for PT and OT usman ortiz .     Restrictions:  Restrictions/Precautions: Fall Risk, Seizure     SUBJECTIVE:     0/10     Post Treatment Pain Screenin/10    Prior Level of Function:  Social/Functional History  Lives With: Alone  Home Layout: Multi-level, Bed/Bath upstairs(9 steps to upstairs; 6 to downstairs)  Home Access: Stairs to enter with rails  Entrance Stairs - Number of Steps: 2  Bathroom Equipment: Shower chair  Home Equipment: Rolling walker  ADL Assistance: Independent  Homemaking Assistance: Independent  Ambulation Assistance: Independent  Transfer Assistance: Independent  Active : No  Additional Comments: has 2 dtrs that stop in daily    OBJECTIVE:   Vision: Impaired  Vision Exceptions: Wears glasses for reading  Hearing: Within functional limits    Cognition:  Overall Orientation Status: Within Functional Limits  Follows Commands: Within Functional Limits    Observation/Palpation  Observation: incision (covered) anterior L temporal area    ROM:  RLE AROM: WFL  LLE AROM : WFL    Strength:  Strength RLE  Strength RLE: WFL  Strength LLE  Strength LLE: WFL    Neuro:  Balance  Sitting - Static: Good  Sitting - Dynamic: Good  Standing - Static: Good;-  Standing - Dynamic: Good;-  Comments: deficits with static and dynamic balance     Motor Control  Gross Motor?: WFL       Bed mobility  Supine to Sit: Supervision  Sit to Supine: Supervision    Transfers  Sit to Stand: Stand by assistance  Stand to sit: Stand by assistance  Bed to Chair: Stand by assistance    Ambulation  Ambulation?: Yes  Ambulation 1  Surface: level tile  Device: No Device  Assistance: Stand by assistance  Quality of Gait: L side uncompensated Trendelenburg, path deviation  Distance: 50 ft x2  Comments: cues for environmental awareness, safety    Activity Tolerance  Activity Tolerance: Patient Tolerated

## 2020-01-08 NOTE — PROGRESS NOTES
Patient continues do well postoperatively. She is up ambulatory able to do most of her own self-cares. Does need some minimal assistance and guidance. Has cognitive issues difficulty with speech. Excellent candidate for discharge transfer to rehabilitation. Plan recheck 1 month with CT scan of the brain. Decadron is being reduced. Continue her other home medications.

## 2020-01-08 NOTE — DISCHARGE SUMMARY
[address]                      [address] [place], [address] [facility]                               DISCHARGE SUMMARY    PATIENT NAME: Elina Kenny                   :        1945  MED REC NO:   [id number]                            ROOM:       N221  ACCOUNT NO:   [de-identified]                           ADMIT DATE: 2020  PROVIDER:     Dylan Anne MD                      [facility] DATE:2020    HOSPITAL COURSE:  Seen in consultation 2020 because of a change in  personality, searching for her words, slight lethargy. Diagnosed with a  left temporal tumor, intracerebral in the anterior middle fossa. She  was admitted and stabilized. 2020, left temporal craniotomy, craniectomy, removal anterior  temporal intracerebral lesion with image-guided system. She tolerated the procedure very well. She was placed in the intensive  care unit postoperatively. She has regained some of her speech. She is  not as lethargic as she was before surgery. She is able to ambulate and  do most all of her self cares. She has significant improvement. Postoperative CT scan of the brain showed no bleeding in the tumor bed  with the significant cerebral edema that she had had preoperatively, but  beginning to resolve. Clinically, she is doing well. 2020, she did undergo EGD as she was having some difficulty  swallowing. She has been evaluated by the hospitalist, GI medicine  which is most appreciated. She is now ready for discharge, transfer to  rehabilitation. OT, PT consults have been obtained. DISCHARGE DIAGNOSES:  1. 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 2.  History of kidney transplant. 3.  Essential hypertension. 4.  Acid reflux. 5.  History of angina. The patient is being weaned from her Decadron. She has chronic  prednisone that she takes for her other medical issues which will be  continued.   We will plan to see her in followup exam in approximately  one month with repeat CT scan of the brain. She was discharged transferred to rehabilitation on 1/9/2020.     Addended 1/9/2020      Marina Cha MD    D: 01/07/2020 15:36:08       T: 01/08/2020 8:36:32     [id number]  Job#: [id number]     Doc#: [id number]    CC:

## 2020-01-09 ENCOUNTER — HOSPITAL ENCOUNTER (INPATIENT)
Age: 75
LOS: 8 days | Discharge: HOME | DRG: 054 | End: 2020-01-17
Attending: PHYSICAL MEDICINE & REHABILITATION | Admitting: PHYSICAL MEDICINE & REHABILITATION
Payer: COMMERCIAL

## 2020-01-09 VITALS
OXYGEN SATURATION: 97 % | HEART RATE: 82 BPM | HEIGHT: 61 IN | TEMPERATURE: 98.4 F | DIASTOLIC BLOOD PRESSURE: 64 MMHG | WEIGHT: 144.7 LBS | RESPIRATION RATE: 16 BRPM | BODY MASS INDEX: 27.32 KG/M2 | SYSTOLIC BLOOD PRESSURE: 142 MMHG

## 2020-01-09 PROBLEM — R26.9 ABNORMALITY OF GAIT AND MOBILITY: Status: ACTIVE | Noted: 2020-01-09

## 2020-01-09 PROBLEM — E03.9 HYPOTHYROID: Status: ACTIVE | Noted: 2020-01-09

## 2020-01-09 PROBLEM — Z94.0 HX OF KIDNEY TRANSPLANT: Status: ACTIVE | Noted: 2020-01-09

## 2020-01-09 PROBLEM — E55.9 VITAMIN D DEFICIENCY: Status: ACTIVE | Noted: 2020-01-09

## 2020-01-09 PROBLEM — I51.7 CARDIOMEGALY: Status: ACTIVE | Noted: 2020-01-09

## 2020-01-09 LAB
BASOPHILS ABSOLUTE: 0 K/UL (ref 0–0.2)
BASOPHILS RELATIVE PERCENT: 0.2 %
EOSINOPHILS ABSOLUTE: 0 K/UL (ref 0–0.7)
EOSINOPHILS RELATIVE PERCENT: 0.3 %
HCT VFR BLD CALC: 32.8 % (ref 37–47)
HEMOGLOBIN: 10.7 G/DL (ref 12–16)
LYMPHOCYTES ABSOLUTE: 0.3 K/UL (ref 1–4.8)
LYMPHOCYTES RELATIVE PERCENT: 8.4 %
MCH RBC QN AUTO: 30 PG (ref 27–31.3)
MCHC RBC AUTO-ENTMCNC: 32.8 % (ref 33–37)
MCV RBC AUTO: 91.5 FL (ref 82–100)
MONOCYTES ABSOLUTE: 0.4 K/UL (ref 0.2–0.8)
MONOCYTES RELATIVE PERCENT: 10.4 %
NEUTROPHILS ABSOLUTE: 3.1 K/UL (ref 1.4–6.5)
NEUTROPHILS RELATIVE PERCENT: 80.7 %
PDW BLD-RTO: 13.8 % (ref 11.5–14.5)
PLATELET # BLD: 153 K/UL (ref 130–400)
RBC # BLD: 3.58 M/UL (ref 4.2–5.4)
WBC # BLD: 3.8 K/UL (ref 4.8–10.8)

## 2020-01-09 PROCEDURE — 99232 SBSQ HOSP IP/OBS MODERATE 35: CPT | Performed by: PSYCHIATRY & NEUROLOGY

## 2020-01-09 PROCEDURE — 6370000000 HC RX 637 (ALT 250 FOR IP): Performed by: PSYCHIATRY & NEUROLOGY

## 2020-01-09 PROCEDURE — 6370000000 HC RX 637 (ALT 250 FOR IP): Performed by: INTERNAL MEDICINE

## 2020-01-09 PROCEDURE — 85025 COMPLETE CBC W/AUTO DIFF WBC: CPT

## 2020-01-09 PROCEDURE — 6360000002 HC RX W HCPCS: Performed by: NEUROLOGICAL SURGERY

## 2020-01-09 PROCEDURE — 6360000002 HC RX W HCPCS: Performed by: NURSE PRACTITIONER

## 2020-01-09 PROCEDURE — 1180000000 HC REHAB R&B

## 2020-01-09 PROCEDURE — 6370000000 HC RX 637 (ALT 250 FOR IP): Performed by: NEUROLOGICAL SURGERY

## 2020-01-09 PROCEDURE — 6360000002 HC RX W HCPCS: Performed by: FAMILY MEDICINE

## 2020-01-09 PROCEDURE — 99232 SBSQ HOSP IP/OBS MODERATE 35: CPT | Performed by: PHYSICAL MEDICINE & REHABILITATION

## 2020-01-09 PROCEDURE — 36415 COLL VENOUS BLD VENIPUNCTURE: CPT

## 2020-01-09 PROCEDURE — 6370000000 HC RX 637 (ALT 250 FOR IP): Performed by: NURSE PRACTITIONER

## 2020-01-09 RX ORDER — SODIUM PHOSPHATE, DIBASIC AND SODIUM PHOSPHATE, MONOBASIC 7; 19 G/133ML; G/133ML
1 ENEMA RECTAL DAILY PRN
Status: DISCONTINUED | OUTPATIENT
Start: 2020-01-09 | End: 2020-01-17 | Stop reason: HOSPADM

## 2020-01-09 RX ORDER — DULOXETIN HYDROCHLORIDE 30 MG/1
30 CAPSULE, DELAYED RELEASE ORAL DAILY
Status: CANCELLED | OUTPATIENT
Start: 2020-01-10

## 2020-01-09 RX ORDER — AMLODIPINE BESYLATE 5 MG/1
5 TABLET ORAL DAILY
Status: DISCONTINUED | OUTPATIENT
Start: 2020-01-10 | End: 2020-01-17 | Stop reason: HOSPADM

## 2020-01-09 RX ORDER — ACETAMINOPHEN 325 MG/1
650 TABLET ORAL EVERY 4 HOURS PRN
Status: DISCONTINUED | OUTPATIENT
Start: 2020-01-09 | End: 2020-01-17 | Stop reason: HOSPADM

## 2020-01-09 RX ORDER — TACROLIMUS 1 MG/1
3 CAPSULE ORAL EVERY MORNING
Status: CANCELLED | OUTPATIENT
Start: 2020-01-10

## 2020-01-09 RX ORDER — DULOXETIN HYDROCHLORIDE 30 MG/1
30 CAPSULE, DELAYED RELEASE ORAL DAILY
Status: DISCONTINUED | OUTPATIENT
Start: 2020-01-10 | End: 2020-01-17 | Stop reason: HOSPADM

## 2020-01-09 RX ORDER — MYCOPHENOLATE MOFETIL 250 MG/1
500 CAPSULE ORAL 2 TIMES DAILY
Status: DISCONTINUED | OUTPATIENT
Start: 2020-01-09 | End: 2020-01-17 | Stop reason: HOSPADM

## 2020-01-09 RX ORDER — SULFAMETHOXAZOLE AND TRIMETHOPRIM 400; 80 MG/1; MG/1
1 TABLET ORAL DAILY
Status: CANCELLED | OUTPATIENT
Start: 2020-01-10

## 2020-01-09 RX ORDER — LEVETIRACETAM 250 MG/1
750 TABLET ORAL 2 TIMES DAILY
Status: DISCONTINUED | OUTPATIENT
Start: 2020-01-09 | End: 2020-01-17 | Stop reason: HOSPADM

## 2020-01-09 RX ORDER — TRAMADOL HYDROCHLORIDE 50 MG/1
50 TABLET ORAL EVERY 6 HOURS PRN
Status: DISCONTINUED | OUTPATIENT
Start: 2020-01-09 | End: 2020-01-17 | Stop reason: HOSPADM

## 2020-01-09 RX ORDER — CINACALCET 30 MG/1
30 TABLET, FILM COATED ORAL DAILY
Status: DISCONTINUED | OUTPATIENT
Start: 2020-01-10 | End: 2020-01-17 | Stop reason: HOSPADM

## 2020-01-09 RX ORDER — TACROLIMUS 1 MG/1
3 CAPSULE ORAL EVERY MORNING
Status: DISCONTINUED | OUTPATIENT
Start: 2020-01-10 | End: 2020-01-17 | Stop reason: HOSPADM

## 2020-01-09 RX ORDER — PANTOPRAZOLE SODIUM 20 MG/1
20 TABLET, DELAYED RELEASE ORAL
Status: DISCONTINUED | OUTPATIENT
Start: 2020-01-10 | End: 2020-01-12

## 2020-01-09 RX ORDER — AMLODIPINE BESYLATE 5 MG/1
5 TABLET ORAL DAILY
Status: CANCELLED | OUTPATIENT
Start: 2020-01-10

## 2020-01-09 RX ORDER — CINACALCET 30 MG/1
30 TABLET, FILM COATED ORAL DAILY
Status: CANCELLED | OUTPATIENT
Start: 2020-01-10

## 2020-01-09 RX ORDER — TRAMADOL HYDROCHLORIDE 50 MG/1
50 TABLET ORAL EVERY 6 HOURS PRN
Status: CANCELLED | OUTPATIENT
Start: 2020-01-09

## 2020-01-09 RX ORDER — PREDNISONE 1 MG/1
5 TABLET ORAL DAILY
Status: DISCONTINUED | OUTPATIENT
Start: 2020-01-10 | End: 2020-01-17 | Stop reason: HOSPADM

## 2020-01-09 RX ORDER — SULFAMETHOXAZOLE AND TRIMETHOPRIM 400; 80 MG/1; MG/1
1 TABLET ORAL DAILY
Status: DISCONTINUED | OUTPATIENT
Start: 2020-01-10 | End: 2020-01-17 | Stop reason: HOSPADM

## 2020-01-09 RX ORDER — PREDNISONE 1 MG/1
5 TABLET ORAL DAILY
Status: CANCELLED | OUTPATIENT
Start: 2020-01-10

## 2020-01-09 RX ORDER — TACROLIMUS 1 MG/1
2 CAPSULE ORAL EVERY EVENING
Status: CANCELLED | OUTPATIENT
Start: 2020-01-09

## 2020-01-09 RX ORDER — PANTOPRAZOLE SODIUM 20 MG/1
20 TABLET, DELAYED RELEASE ORAL
Status: CANCELLED | OUTPATIENT
Start: 2020-01-10

## 2020-01-09 RX ORDER — TACROLIMUS 1 MG/1
2 CAPSULE ORAL EVERY EVENING
Status: DISCONTINUED | OUTPATIENT
Start: 2020-01-09 | End: 2020-01-17 | Stop reason: HOSPADM

## 2020-01-09 RX ORDER — LACTULOSE 10 G/15ML
20 SOLUTION ORAL DAILY PRN
Status: DISCONTINUED | OUTPATIENT
Start: 2020-01-09 | End: 2020-01-17 | Stop reason: HOSPADM

## 2020-01-09 RX ORDER — MYCOPHENOLATE MOFETIL 250 MG/1
500 CAPSULE ORAL 2 TIMES DAILY
Status: CANCELLED | OUTPATIENT
Start: 2020-01-09

## 2020-01-09 RX ADMIN — PANTOPRAZOLE SODIUM 20 MG: 20 TABLET, DELAYED RELEASE ORAL at 05:27

## 2020-01-09 RX ADMIN — METOPROLOL TARTRATE 25 MG: 25 TABLET ORAL at 08:58

## 2020-01-09 RX ADMIN — AMLODIPINE BESYLATE 5 MG: 5 TABLET ORAL at 08:58

## 2020-01-09 RX ADMIN — PREDNISONE 5 MG: 2.5 TABLET ORAL at 08:58

## 2020-01-09 RX ADMIN — TACROLIMUS 3 MG: 1 CAPSULE ORAL at 09:03

## 2020-01-09 RX ADMIN — DEXAMETHASONE INTENSOL 4 MG: 1 SOLUTION, CONCENTRATE ORAL at 09:03

## 2020-01-09 RX ADMIN — HYDRALAZINE HYDROCHLORIDE 10 MG: 20 INJECTION INTRAMUSCULAR; INTRAVENOUS at 05:27

## 2020-01-09 RX ADMIN — DULOXETINE HYDROCHLORIDE 30 MG: 30 CAPSULE, DELAYED RELEASE ORAL at 08:58

## 2020-01-09 RX ADMIN — LEVETIRACETAM 750 MG: 250 TABLET ORAL at 21:00

## 2020-01-09 RX ADMIN — TACROLIMUS 2 MG: 1 CAPSULE ORAL at 21:01

## 2020-01-09 RX ADMIN — MYCOPHENOLATE MOFETIL 500 MG: 250 CAPSULE ORAL at 20:58

## 2020-01-09 RX ADMIN — CINACALCET 30 MG: 30 TABLET, FILM COATED ORAL at 08:58

## 2020-01-09 RX ADMIN — DOCUSATE SODIUM 100 MG: 100 CAPSULE, LIQUID FILLED ORAL at 08:58

## 2020-01-09 RX ADMIN — SULFAMETHOXAZOLE AND TRIMETHOPRIM 1 TABLET: 400; 80 TABLET ORAL at 08:58

## 2020-01-09 RX ADMIN — METOPROLOL TARTRATE 25 MG: 25 TABLET, FILM COATED ORAL at 21:01

## 2020-01-09 RX ADMIN — LEVETIRACETAM 750 MG: 500 TABLET, FILM COATED ORAL at 08:58

## 2020-01-09 RX ADMIN — MYCOPHENOLATE MOFETIL 500 MG: 250 CAPSULE ORAL at 09:02

## 2020-01-09 RX ADMIN — DEXAMETHASONE INTENSOL 4 MG: 1 SOLUTION, CONCENTRATE ORAL at 20:58

## 2020-01-09 ASSESSMENT — PAIN SCALES - GENERAL
PAINLEVEL_OUTOF10: 0
PAINLEVEL_OUTOF10: 0

## 2020-01-09 ASSESSMENT — ENCOUNTER SYMPTOMS
COUGH: 0
VOMITING: 0
TROUBLE SWALLOWING: 0
NAUSEA: 0
CHEST TIGHTNESS: 0
WHEEZING: 0
COLOR CHANGE: 0
SHORTNESS OF BREATH: 0

## 2020-01-09 NOTE — CARE COORDINATION
Precert for the acute in pt received, informed Particia Regan and gave her the rehab room # [phone].  Electronically signed by Imtiaz Cortes RN on 1/9/20 at 10:40 AM

## 2020-01-09 NOTE — PLAN OF CARE
Problem: Coping:  Goal: Ability to cope will improve  Description  Ability to cope will improve  Outcome: Ongoing     Problem: Health Behavior:  Goal: Identification of resources available to assist in meeting health care needs will improve  Description  Identification of resources available to assist in meeting health care needs will improve  Outcome: Ongoing     Problem: Physical Regulation:  Goal: Complications related to the disease process, condition or treatment will be avoided or minimized  Description  Complications related to the disease process, condition or treatment will be avoided or minimized  Outcome: Ongoing     Problem: Role Relationship:  Goal: Ability to communicate needs accurately will improve - ADL needs  Description  Ability to communicate needs accurately will improve  Outcome: Ongoing     Problem: Skin Integrity:  Goal: Demonstration of wound healing without infection will improve  Description  Demonstration of wound healing without infection will improve  Outcome: Ongoing     Problem: Falls - Risk of:  Goal: Will remain free from falls  Description  Will remain free from falls  Outcome: Ongoing  Goal: Absence of physical injury  Description  Absence of physical injury  Outcome: Ongoing

## 2020-01-09 NOTE — PROGRESS NOTES
Neurology Daily Progress Note  Name: Lance Mccartney  Age: 69 y.o. Gender: female  CodeStatus: Full Code  Allergies: Benadryl [Diphenhydramine]  Chlorhexidine    Chief Complaint:Altered Mental Status    Primary Care Provider: Terry Pena DO  InpatientTreatment Team: Treatment Team: Attending Provider: Deidre Ferrer MD; Consulting Physician: Sukhi Santoyo MD; Consulting Physician: Ching Smith MD; Surgeon: Sukhi Santoyo MD; Consulting Physician: [place] Segura MD; Consulting Physician: Chery Plascencia MD; Surgeon: Chery Plascencia MD; Utilization Reviewer: Quentin Courtney, RN; Registered Nurse: Josiah Perez, DIPAK; : Ramirez Schaefer RN  Admission Date: 1/1/2020      HPI Pt seen and examined for neuro follow up for 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 S/p craniotomy on 1/3/19. Pt speaks mostly [place]. Daughter at bedside. Expressive aphasia somewhat improved. No other focal deficits. Denies Headache, double vision, blurry vision, difficulty with speech, difficulty with swallowing, weakness, numbness, pain, nausea, vomiting, choking, neck pain, dizziness  Translation is  obtained from the daughter was at the bedside. Vitals:    01/09/20 0900   BP: (!) 142/64   Pulse: 82   Resp: 16   Temp: 98.4 °F (36.9 °C)   SpO2:       Review of Systems   Constitutional: Negative for appetite change, chills, fatigue and fever. HENT: Negative for hearing loss and trouble swallowing. Eyes: Negative for visual disturbance. Respiratory: Negative for cough, chest tightness, shortness of breath and wheezing. Cardiovascular: Negative for chest pain, palpitations and leg swelling. Gastrointestinal: Negative for nausea and vomiting. Genitourinary: Negative for difficulty urinating. Musculoskeletal: Negative for gait problem. Skin: Negative for color change and rash. Neurological: Positive for speech difficulty.  Negative for dizziness, tremors, seizures, syncope, facial asymmetry, weakness, light-headedness, numbness and headaches. Psychiatric/Behavioral: Negative for agitation, confusion and hallucinations. The patient is not nervous/anxious. Physical Exam  Vitals signs and nursing note reviewed. Constitutional:       General: She is not in acute distress. Appearance: She is not diaphoretic. HENT:      Head: Normocephalic. Comments: left surgical incision well approx, no drainage or erythema  Eyes:      Pupils: Pupils are equal, round, and reactive to light. Cardiovascular:      Rate and Rhythm: Normal rate and regular rhythm. Pulmonary:      Effort: Pulmonary effort is normal. No respiratory distress. Breath sounds: Normal breath sounds. Abdominal:      General: Bowel sounds are normal. There is no distension. Palpations: Abdomen is soft. Tenderness: There is no tenderness. Skin:     General: Skin is warm and dry. Neurological:      General: No focal deficit present. Mental Status: She is alert and oriented to person, place, and time. Cranial Nerves: No cranial nerve deficit. Sensory: No sensory deficit.       Gait: Gait normal.       Medications:  Reviewed    Infusion Medications:    lactated ringers      sodium chloride Stopped (01/03/20 2120)    sodium chloride Stopped (01/04/20 1008)     Scheduled Medications:    dexamethasone  4 mg Oral BID    [START ON 1/11/2020] dexamethasone  4 mg Oral Daily    metoprolol tartrate  25 mg Oral BID    amLODIPine  5 mg Oral Daily    pantoprazole  20 mg Oral QAM AC    cinacalcet  30 mg Oral Daily    0.9 % sodium chloride  250 mL Intravenous Once    predniSONE  5 mg Oral Daily    sodium chloride flush  10 mL Intravenous 2 times per day    docusate sodium  100 mg Oral BID    levETIRAcetam  750 mg Oral BID    sodium chloride flush  10 mL Intravenous BID    sodium chloride flush  10 mL Intravenous 2 times per day    enoxaparin  40 mg Subcutaneous Daily    sulfamethoxazole-trimethoprim  1 tablet Oral Daily    mycophenolate  500 mg Oral BID    DULoxetine  30 mg Oral Daily    tacrolimus  3 mg Oral QAM    tacrolimus  2 mg Oral QPM     PRN Meds: traMADol, sodium chloride flush, HYDROmorphone **OR** HYDROmorphone, ondansetron, labetalol, hydrALAZINE, sodium chloride flush, magnesium hydroxide, LORazepam    Labs:   Recent Labs     20  0522 20  0536 20  0523   WBC 4.1* 3.9* 3.8*   HGB 10.1* 10.4* 10.7*   HCT 31.0* 32.3* 32.8*    166 153     Recent Labs     20  0522 20  0536    136   K 4.4 4.6    101   CO2 24 23   BUN 30* 35*   CREATININE 0.99* 0.90   CALCIUM 8.9 9.3     No results for input(s): AST, ALT, BILIDIR, BILITOT, ALKPHOS in the last 72 hours. No results for input(s): INR in the last 72 hours. No results for input(s): Skipper Christopher in the last 72 hours. Urinalysis:   Lab Results   Component Value Date    NITRU Negative 2020    BLOODU Negative 2020    SPECGRAV 1.014 2020    GLUCOSEU Negative 2020       Radiology:   Most recent    EEG No procedure found. MRI of Brain   Results for orders placed during the hospital encounter of 20   MRI Brain W WO Contrast    Narrative Patient MRN: [id number]    : 1945    Age:  69 years    Gender: Female    Order Date: 2020 9:45 AM.     Exam: MRI BRAIN W WO CONTRAST    Number of Views: 3317     Indication:  Stroke    Comparison: Brain MRI 2014. Head CT 2020. Contrast dosage: MultiHance, 7 mL, IV    Findings:   No evident restricted diffusion or acute/subacute cerebrovascular infarction/accident definitively identified. Pituitary gland and sellar/suprasellar regions are unremarkable. No Chiari malformation. . Hippocampal formations are symmetric in signal intensity and morphology bilaterally given the mass effect on the left compared with the normal-appearing right. No   intrinsic noncontrast T1 shortening.     T1 results found for this or any previous visit. No results found for this or any previous visit. Results for orders placed during the hospital encounter of 01/01/20   MRA HEAD WO CONTRAST    Narrative EXAM: MR angiography of the head without contrast.    History: Meningioma    Technique: Multiplanar multisequence MRI of the brain was performed. 3-D time-of-flight axial images were performed through the [place] [facility] [place]. 3-D volume rendered images of the [place] [facility] [place] performed. Comparison: MRI of the brain from January 1, 2000    Findings: The bilateral distal cervical internal carotid arteries through the skull base are patent. The bilateral middle cerebral arteries through the trifurcation and opercular branches are patent. The vertebrobasilar system including the superior cerebellar and posterior cerebral arteries are patent. Left vertebral artery is dominant. Posterior communicating arteries are patent. The bilateral anterior cerebral arteries and anterior communicating artery are patent. No aneurysm or high-grade stenosis of the visualized cerebral vasculature. No significant interval change in edema of the left frontal, parietal, and temporal lobes. No significant interval change of 6 mm of rightward midline shift. Again identified is a 2.3 cm mass within the anterior left middle cranial fossa as detailed on   recent MRI of the brain. Impression No aneurysm or high-grade stenosis, or vascular malformation of the visualized cerebral vasculature. No significant interval change in left frontal, parietal, and temporal lobe edema and 6 mm of rightward midline shift. CT of the Head:   Results for orders placed during the hospital encounter of 01/01/20   CT HEAD WO CONTRAST    Narrative CT HEAD WO CONTRAST    CLINICAL HISTORY:  Postop     COMPARISON: January 1, 2020.   Clinical note: Please see MRI report of brain January 1, 2024 additional details    TECHNIQUE: Multiple unenhanced serial axial images of the brain from the vertex of the skull to the base of the skull were performed. FINDINGS: There is been interval left-sided craniotomy since prior examination. There is pneumocephaly seen anterior to the left frontal lobe and anterior to the left temporal lobe. Most likely postoperative. There is still effacement of the frontal, body and posterior horn of the left lateral ventricle with shift of the midline to the right of approximately 8 to 9 mm. The cisterns remain grossly unchanged. Again note is made of diffuse decreased attenuation throughout the subcortical white matter of the left frontal, parietal and temporal lobes most likely consistent with vasogenic edema. There are changes within the anterior aspect of the left middle cranial fossa which May reflect postoperative changes at site of tumor resection, removal..    The visualized portion of the paranasal sinuses, and mastoids are unremarkable. There are soft tissue changes in subcutaneous emphysema seen overlying left frontal parietal region and in the infratemporal fossa. Most likely postsurgical.      Impression INTERVAL CRANIOTOMY WITH PROBABLE RESECTION REMOVAL OF LEFT anterior MIDDLE CRANIAL FOSSA TUMOR. PERSISTENT DIFFUSE VASOGENIC EDEMA OF THE LEFT FRONTAL PARIETAL TEMPORAL LOBES  PERSISTENT EFFACEMENT OF THE LEFT LATERAL VENTRICLE WITH SHIFT OF MIDLINE TO THE RIGHT. OTHER FINDINGS DETAILED ABOVE    All CT scans at this facility use dose modulation, iterative reconstruction, and/or weight based dosing when appropriate to reduce radiation dose to as low as reasonably achievable. No results found for this or any previous visit. No results found for this or any previous visit. Carotid duplex: No results found for this or any previous visit. No results found for this or any previous visit. No results found for this or any previous visit.     Echo No results found for this or any previous visit. Assessment/Plan:    Left cerebral tumor with cerebral edema  S/p left craniotomy 1/3/20  The tumor may suggest a meningioma, pathology pending  Expressive aphasia, improved  will continue on Decadron and Keppra for now. Decadron wean  Hx kidney transplant on immunosuppressive drugs  PT/OT/ST  DC [facility]  OK to DC from neuro standpoint  Follow up 4-6 weeks\    Deandre Machado MD, [address] American Board of Psychiatry & Neurology  Board Certified in Vascular Neurology  Board Certified in Neuromuscular Medicine  Certified in Neurorehabilitation         Collaborating physicians: Dr Randa Machado    Electronically signed by AMY Nagel CNP on 1/9/2020 at 11:17 AM

## 2020-01-09 NOTE — FLOWSHEET NOTE
Patient arrived on the Rehab unit with her 2 daughters. She is alert and oriented x 4, but speaks little [place] ([place]). Her DTR Rose Amezcua is who she lives with and says she will be here a lot. She has no equipment at home . She has been told not to get up without assistance, but already got up once. The skin issues she has are the incision on the left side of her head and her fistula in her left wrist which is distended. (no longer being used). Her daughters shaved her head so her head will grow out even.

## 2020-01-09 NOTE — DISCHARGE SUMMARY
Hospital Medicine Discharge Summary    Lance Mccartney  :  1945  MRN:  [id number]    Admit date:  2020  Discharge date:  2020    Admitting Physician:  Aden Conley MD  Primary Care Physician:  Terry Pena DO    Lance Mccartney is a 69 y.o. female that was admitted and treated at [facility] for the following medical issues:     Principal Problem:    Brain neoplasm [facility])  Active Problems:    [facility]    Essential hypertension    Acid reflux    Gout    Angina, class IV (HCC)    Mixed dyslipidemia    Elevated blood uric acid level    Cerebral malignant neoplasm (HCC)    Generalized seizure (HCC)    Aphasia    Anxiety    Chronic pain disorder    Depression, major, recurrent, moderate (HCC)    Epigastric abdominal pain    Pain in thoracic spine    Abnormality of gait and mobility due to 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 [facility] admit 20. Cardiomegaly    Hypothyroid    Vitamin D deficiency    BMI 27.0-27.9,adult    Hx of kidney transplant  Resolved Problems:    * No resolved hospital problems. *      Discharge Diagnoses:    Principal Problem:    B[facility]oplasm ([facility]  Active Problems:    End stage kidney disease (HCC)    Essential hypertension    Acid reflux    Gout    Angina, class IV (HCC)    Mixed dyslipidemia    Elevated blood uric acid level    Cerebral malignant neoplasm (HCC)    Generalized seizure (HCC)    Aphasia    Anxiety    Chronic pain disorder    Depression, major, recurrent, moderate (HCC)    Epigastric abdominal pain    Pain in thoracic spine    Abnormality of gait and mobility due to 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 [facility] admit 20. Cardiomegaly    Hypothyroid    Vitamin D deficiency    BMI 27.0-27.9,adult    Hx of kidney transplant  Resolved Problems:    * No resolved hospital problems.  *    Chief Complaint needs that were indicated and/or required as been addressed and set up by Social Work. Condition at discharge: Pt was medically stable at the time of discharge. Activity: activity as tolerated    Total time taken for discharging this patient: 40 minutes. Greater than 70% of time was spent focused exclusively on this patient. Time was taken to review chart, discuss plans with consultants, reconciling medications, discussing plan answering questions with patient.      Signed:  Jannet Jerez

## 2020-01-09 NOTE — PROGRESS NOTES
assistance  Bed to Chair: Stand by assistance, Ambulation 1  Surface: level tile  Device: No Device  Assistance: Stand by assistance  Quality of Gait: L side uncompensated Trendelenburg, path deviation  Distance: 50 ft x2  Comments: cues for environmental awareness, safety,      FIMS:  ,  , Assessment: Pt with above deficits that are new s/p craniotomy. Pt will benefit from continued PT in order to progress mobility and safety, reduce falls risk. Occupational therapy: FIMS:   ,  ,      Speech therapy: FIMS:        Lab/X-ray studies reviewed, analyzed and discussed with patient and staff:   Recent Results (from the past 24 hour(s))   CBC auto differential    Collection Time: 01/09/20  5:23 AM   Result Value Ref Range    WBC 3.8 (L) 4.8 - 10.8 K/uL    RBC 3.58 (L) 4.20 - 5.40 M/uL    Hemoglobin 10.7 (L) 12.0 - 16.0 g/dL    Hematocrit 32.8 (L) 37.0 - 47.0 %    MCV 91.5 82.0 - 100.0 fL    MCH 30.0 27.0 - 31.3 pg    MCHC 32.8 (L) 33.0 - 37.0 %    RDW 13.8 11.5 - 14.5 %    Platelets 378 659 - 489 K/uL    Neutrophils % 80.7 %    Lymphocytes % 8.4 %    Monocytes % 10.4 %    Eosinophils % 0.3 %    Basophils % 0.2 %    Neutrophils Absolute 3.1 1.4 - 6.5 K/uL    Lymphocytes Absolute 0.3 (L) 1.0 - 4.8 K/uL    Monocytes Absolute 0.4 0.2 - 0.8 K/uL    Eosinophils Absolute 0.0 0.0 - 0.7 K/uL    Basophils Absolute 0.0 0.0 - 0.2 K/uL       Previous extensive, complex labs, notes and diagnostics reviewed and analyzed. ALLERGIES:    Allergies as of 01/01/2020 - Review Complete 01/01/2020   Allergen Reaction Noted    Benadryl [diphenhydramine]  05/08/2017    Chlorhexidine  05/08/2017      (please also verify by checking [facility])     Complex Physical Medicine & Rehab Issues Assess & Plan:   1. Severe abnormality of gait and mobility and impaired self-care and ADL's secondary to progressive brain tumor with recent craniotomy.   Functional and medical status reassessed regarding patients ability to participate in therapies and patient found to be able to participate in  acute intensive comprehensive inpatient rehabilitation program including PT/OT to improve balance, ambulation, ADLs, and to improve the P/AROM. 2. Bowel and Bladder dysfunction:  frequent toileting, ambulate to bathroom with assistance, check post void residuals. Check for C.difficile x1 if >2 loose stools in 24 hours, continue bowel & bladder program.   3. Severe postop pain craniotomy and generalized OA pain: reassess pain every shift and prior to and after each therapy session, give prn  Tylenol, modalities prn in therapy, consider Lidoderm, K-pad prn.   4. Skin breakdown risk:  continue pressure relief program.  Daily skin exams and reports from nursing. 5. Severe fatigue due to immobility and nutritional deficits: Add vitamin B12 vitamin D and CoQ10 titrate dosing and add protein supplementation with low carb content. 6. Complex discharge planning:   Patient has been cleared to come to acute rehab and will transition today I will do her rehabilitation medication reconciliation. Complex Active General Medical Issues that complicate care Assess & Plan:     1. Principal Problem:    Brain neoplasm ([facility]  Active Problems:    End stage kidney disease (HCC)    Essential hypertension    Acid reflux    Gout    Angina, class IV (HCC)    Mixed dyslipidemia    Elevated blood uric acid level    Cerebral malignant neoplasm (HCC)    Generalized seizure (HCC)    Aphasia    Anxiety    Chronic pain disorder    Depression, major, recurrent, moderate (HCC)    Epigastric abdominal pain    Pain in thoracic spine  Resolved Problems:    * No resolved hospital problems.  Bianka Mora D.O., PM&R     Attending    [address]

## 2020-01-10 PROCEDURE — 92610 EVALUATE SWALLOWING FUNCTION: CPT

## 2020-01-10 PROCEDURE — 97530 THERAPEUTIC ACTIVITIES: CPT

## 2020-01-10 PROCEDURE — 97166 OT EVAL MOD COMPLEX 45 MIN: CPT

## 2020-01-10 PROCEDURE — 99223 1ST HOSP IP/OBS HIGH 75: CPT | Performed by: PHYSICAL MEDICINE & REHABILITATION

## 2020-01-10 PROCEDURE — 6360000002 HC RX W HCPCS: Performed by: NURSE PRACTITIONER

## 2020-01-10 PROCEDURE — 1180000000 HC REHAB R&B

## 2020-01-10 PROCEDURE — 97162 PT EVAL MOD COMPLEX 30 MIN: CPT

## 2020-01-10 PROCEDURE — 6370000000 HC RX 637 (ALT 250 FOR IP): Performed by: NURSE PRACTITIONER

## 2020-01-10 PROCEDURE — 92523 SPEECH SOUND LANG COMPREHEN: CPT

## 2020-01-10 PROCEDURE — 97112 NEUROMUSCULAR REEDUCATION: CPT

## 2020-01-10 PROCEDURE — 97535 SELF CARE MNGMENT TRAINING: CPT

## 2020-01-10 PROCEDURE — 97116 GAIT TRAINING THERAPY: CPT

## 2020-01-10 RX ADMIN — TACROLIMUS 3 MG: 1 CAPSULE ORAL at 07:49

## 2020-01-10 RX ADMIN — LEVETIRACETAM 750 MG: 250 TABLET ORAL at 20:02

## 2020-01-10 RX ADMIN — SULFAMETHOXAZOLE AND TRIMETHOPRIM 1 TABLET: 400; 80 TABLET ORAL at 11:12

## 2020-01-10 RX ADMIN — PANTOPRAZOLE SODIUM 20 MG: 20 TABLET, DELAYED RELEASE ORAL at 05:43

## 2020-01-10 RX ADMIN — MYCOPHENOLATE MOFETIL 500 MG: 250 CAPSULE ORAL at 20:02

## 2020-01-10 RX ADMIN — LEVETIRACETAM 750 MG: 250 TABLET ORAL at 07:47

## 2020-01-10 RX ADMIN — ENOXAPARIN SODIUM 40 MG: 40 INJECTION SUBCUTANEOUS at 07:51

## 2020-01-10 RX ADMIN — AMLODIPINE BESYLATE 5 MG: 5 TABLET ORAL at 07:48

## 2020-01-10 RX ADMIN — METOPROLOL TARTRATE 25 MG: 25 TABLET, FILM COATED ORAL at 07:47

## 2020-01-10 RX ADMIN — CINACALCET HYDROCHLORIDE 30 MG: 30 TABLET, FILM COATED ORAL at 11:13

## 2020-01-10 RX ADMIN — PREDNISONE 5 MG: 5 TABLET ORAL at 07:48

## 2020-01-10 RX ADMIN — TACROLIMUS 2 MG: 1 CAPSULE ORAL at 20:03

## 2020-01-10 RX ADMIN — DULOXETINE HYDROCHLORIDE 30 MG: 30 CAPSULE, DELAYED RELEASE ORAL at 07:49

## 2020-01-10 RX ADMIN — MYCOPHENOLATE MOFETIL 500 MG: 250 CAPSULE ORAL at 07:50

## 2020-01-10 RX ADMIN — DEXAMETHASONE INTENSOL 4 MG: 1 SOLUTION, CONCENTRATE ORAL at 08:10

## 2020-01-10 RX ADMIN — METOPROLOL TARTRATE 25 MG: 25 TABLET, FILM COATED ORAL at 20:02

## 2020-01-10 ASSESSMENT — ENCOUNTER SYMPTOMS
EYE PAIN: 1
VISUAL CHANGE: 0
ABDOMINAL PAIN: 0
BACK PAIN: 0
BOWEL INCONTINENCE: 0

## 2020-01-10 ASSESSMENT — PAIN SCALES - GENERAL
PAINLEVEL_OUTOF10: 0
PAINLEVEL_OUTOF10: 0

## 2020-01-10 NOTE — PROGRESS NOTES
Pt speaks [place] but understands broken english, pt was able to understand our conversation this morning and has no questions at this time. Pt sitting on the side of the bed playing on her personal tablet w/call light w/in reach.  Electronically signed by Meeta Parham RN on 1/10/2020 at 8:07 AM

## 2020-01-10 NOTE — PLAN OF CARE
Problem: Coping:  Goal: Ability to cope will improve  Description  Ability to cope will improve  1/10/2020 0158 by Drew Lainey. Mesha Willett RN  Outcome: Ongoing  1/9/2020 1441 by Luis Aquino RN  Outcome: Ongoing     Problem: Coping:  Goal: Ability to cope will improve  Description  Ability to cope will improve  1/10/2020 0158 by Drew Lainey. Mesha Willett RN  Outcome: Ongoing  1/9/2020 1441 by Luis Aquino RN  Outcome: Ongoing     Problem: Coping:  Goal: Ability to cope will improve  Description  Ability to cope will improve  1/10/2020 0158 by Drew Lainey. Mesha Willett RN  Outcome: Ongoing  1/9/2020 1441 by Luis Aquino RN  Outcome: Ongoing     Problem: Coping:  Goal: Ability to cope will improve  Description  Ability to cope will improve  1/10/2020 0158 by Drew Lainey. Mesha Willett RN  Outcome: Ongoing  1/9/2020 1441 by Luis Aquino RN  Outcome: Ongoing     Problem: Coping:  Goal: Ability to cope will improve  Description  Ability to cope will improve  1/10/2020 0158 by Drew Lainey. Mesha Willett RN  Outcome: Ongoing  1/9/2020 1441 by Luis Aquino RN  Outcome: Ongoing     Problem: Coping:  Goal: Ability to cope will improve  Description  Ability to cope will improve  1/10/2020 0158 by Drew Lainey.  Mesha Willett RN  Outcome: Ongoing  1/9/2020 1441 by Luis Aquino RN  Outcome: Ongoing     Problem: Health Behavior:  Goal: Identification of resources available to assist in meeting health care needs will improve  Description  Identification of resources available to assist in meeting health care needs will improve  1/9/2020 1441 by Luis Aquino RN  Outcome: Ongoing     Problem: Physical Regulation:  Goal: Complications related to the disease process, condition or treatment will be avoided or minimized  Description  Complications related to the disease process, condition or treatment will be avoided or minimized  1/9/2020 1441 by Luis Aquino RN  Outcome: Ongoing     Problem: Role Relationship:  Goal: Ability to communicate needs accurately will improve - ADL needs  Description  Ability to communicate needs accurately will improve  1/9/2020 1441 by John Paul Galdamez RN  Outcome: Ongoing     Problem: Skin Integrity:  Goal: Demonstration of wound healing without infection will improve  Description  Demonstration of wound healing without infection will improve  1/10/2020 0158 by Rogers Fuentes. Marvel Ruiz RN  Outcome: Ongoing  1/9/2020 1441 by John Paul Galdamez RN  Outcome: Ongoing     Problem: Falls - Risk of:  Goal: Will remain free from falls  Description  Will remain free from falls  1/10/2020 0158 by Rogers Fuentes. Marvel Ruiz RN  Outcome: Ongoing  1/9/2020 1441 by John Paul Galdamez RN  Outcome: Ongoing     Problem: Falls - Risk of:  Goal: Absence of physical injury  Description  Absence of physical injury  1/10/2020 0158 by Rogers Fuentes. Marvel Ruiz RN  Outcome: Ongoing     Problem: Falls - Risk of:  Goal: Absence of physical injury  Description  Absence of physical injury  1/10/2020 0158 by Rogers Fuentes.  Kamryn Ontiveros RN  Outcome: Ongoing  1/9/2020 1441 by John Paul Galdamez RN  Outcome: Ongoing

## 2020-01-10 NOTE — H&P
HISTORY & PHYSICAL       DATE OF ADMISSION:  1/9/2020    DATE OF SERVICE:  1/10/20    Subjective:    Paty Celaya, 69 y.o. female presents today with:     CHIEF COMPLAINT:   69year old female who was admitted through the ER because of a change in mental status. Daughter went to awaken her the morning of admission and she stared blankly ahead and could not speak and did not respond. Her native language is [place] but she was having difficulty finding the words to speak. She had a CT Head which showed cerebral edema and some herniation. MRI was ordered immediately and it showed a enhancing mass/malignancy in the left middle cranial fossa/left anterior temporal lobe suspicious for meningoma although glioblastoma was no excluded. MRI showed left to right subfalcine herniation of 0.56 cm, also revealed dominant left vertebral artery. Neurosurgery saw her and on 01/03/20 she had a Left Temporal Craniotomy, Craniotomy with removal of Intracerebral lesion. Repeat CT Head 01/04/20 revealed interval craniotomy with probable resection removal of left anterior middle cranial fossa tumor. Persistent diffuse vasogenic edema of left frontal, parietal, temporal lobes. Persistent effacement of left lateral ventricle with shift to right. 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 and is admitted to the acute inpatient rehab program.     Transcribed from pre-admission information sheet completed by Cara Gannon RN/mdl as directed by Dr. Daryle Slater. Neurologic Problem   The patient's primary symptoms include an altered mental status, clumsiness, focal sensory loss, focal weakness and a loss of balance. The patient's pertinent negatives include no memory loss, near-syncope, slurred speech, syncope or visual change. This is a chronic problem.  The current episode started more than 1 year ago. The neurological problem developed insidiously. The problem has been gradually worsening since onset. There was lower extremity, right-sided, upper extremity and left-sided focality noted. Associated symptoms include confusion, dizziness, fatigue, headaches, light-headedness and neck pain. Pertinent negatives include no abdominal pain, auditory change, aura, back pain, bladder incontinence, bowel incontinence, chest pain, diaphoresis, fever, nausea, palpitations, shortness of breath, vertigo or vomiting. Past treatments include walking. There is no history of a bleeding disorder, a clotting disorder, a CVA, dementia, head trauma, liver disease, mood changes or seizures. Headache    This is a chronic problem. The current episode started 1 to 4 weeks ago. The problem occurs constantly. The problem has been waxing and waning. The pain is located in the right unilateral region. The pain quality is similar to prior headaches. The quality of the pain is described as aching and band-like. The pain is at a severity of 8/10. The pain is severe. Associated symptoms include dizziness, eye pain, a loss of balance and neck pain. Pertinent negatives include no abdominal pain, abnormal behavior, anorexia, back pain, fever, nausea, visual change or vomiting. The symptoms are aggravated by weather changes, Valsalva, exposure to cold air, fatigue and bright light. She has tried Excedrin, beta blockers, antidepressants, triptans, oxygen and oral narcotics for the symptoms. The treatment provided moderate relief. Her past medical history is significant for cancer, hypertension and immunosuppression. There is no history of cluster headaches, migraine headaches, migraines in the family, obesity, pseudotumor cerebri, recent head traumas, sinus disease or TMJ.            The patient has stabilized medically andis able to participate at acute level rehab but is too medically complex for SNF due to need for therapy at the acute level with at least 15 hours a week of PT OT and cognitive and recreational therapy at an acute level with daily medical monitoring. Imaging:    Imaging and other studies reviewed and discussed with patient and staff    Ct Head Wo  2020  CT HEAD WO CONTRAST CLINICAL HISTORY:  Postop COMPARISON: 2020. Clinical note: Please see MRI report of brain 2024 additional details TECHNIQUE: Multiple unenhanced serial axial images of the brain from the vertex of the skull to the base of the skull were performed. FINDINGS: There is been interval left-sided craniotomy since prior examination. There is pneumocephaly seen anterior to the left frontal lobe and anterior to the left temporal lobe. Most likely postoperative. There is still effacement of the frontal, body and posterior horn of the left lateral ventricle with shift of the midline to the right of approximately 8 to 9 mm. The cisterns remain grossly unchanged. Again note is made of diffuse decreased attenuation throughout the subcortical white matter of the left frontal, parietal and temporal lobes most likely consistent with vasogenic edema. There are changes within the anterior aspect of the left middle cranial fossa which May reflect postoperative changes at site of tumor resection, removal.. The visualized portion of the paranasal sinuses, and mastoids are unremarkable. There are soft tissue changes in subcutaneous emphysema seen overlying left frontal parietal region and in the infratemporal fossa. Most likely postsurgical.     INTERVAL CRANIOTOMY WITH PROBABLE RESECTION REMOVAL OF LEFT anterior MIDDLE CRANIAL FOSSA TUMOR. PERSISTENT DIFFUSE VASOGENIC EDEMA OF THE LEFT FRONTAL PARIETAL TEMPORAL LOBES PERSISTENT EFFACEMENT OF THE LEFT LATERAL VENTRICLE WITH SHIFT OF MIDLINE TO THE RIGHT.  OTHER FINDINGS DETAILED ABOVE     Ct Head Wo   2020  Patient MRN: [id number] : 1945 Age:  69 years Order Date: 1/1/2020 8:45 AM. Gender: Female Exam: CT HEAD WO CONTRAST Number of Images: 141 Indication:   Change in mental status, cerebrovascular accident, hemorrhage Contrast dosage: None Comparison: None. Findings: This examination was performed on a CT scanner with dose reduction. Technique: Low-dose CT  acquisition technique included one of following options; 1 . Automated exposure control, 2. Adjustment of MA and or KV according to patient's size or 3. Use of iterative reconstruction. Large area of hypodensity left frontal lobe white matter, left parietal lobe, left temporal lobe, and left basal ganglia. There is 0.56 cm left to right subfalcine herniation. No intraparenchymal hemorrhage definitively identified. No comparison studies. Pituitary gland and sellar/suprasellar regions are unremarkable. No Chiari malformation. . No acute fracture or lytic or blastic bony lesion. Optic globes and orbital contents unremarkable. Vascular calcifications within the bilateral internal carotid artery cavernous segments and the vertebral arteries. .     Findings communicated directly with Dr. Dianne Randhawa  on 1/1/2020 9:25 AM .   1. Abnormal exam. Large area of hypodensity and edema with mass effect on left frontal, temporal and parietal lobes and left basal ganglia with left to right subfalcine herniation of 0.56 cm. Findings are concerning for potential underlying mass/malignancy with extensive edema and mass effect versus cerebrovascular infarction/accident with edema and mass effect. Further evaluation MRI of the brain with and without IV contrast is recommended. 2. Vascular calcifications within the bilateral internal carotid artery cavernous segments and the vertebral arteries. Ct Chest Wo  1/4/2020  EXAMINATION:  CT SCAN CHEST.  CLINICAL HISTORY:  Abnormal chest x-ray COMPARISON:  None TECHNIQUE:  Multiple serial axial images of the chest from the base neck through the upper abdomen with both sagittal coronal reconstruction was performed without intravenous or oral administration of contrast. FINDINGS:  There is a patchy mosaic appearance lung parenchyma that can be seen with small airway disease. The nodular density seen on the plain film may correspond to an area of atelectasis, scarring at the base of the left upper lobe extending to the pleura. No focal parenchymal abnormalities. There is an area of atelectasis, scarring base left lower lobe. No additional focal parenchymal abnormalities or pleural effusions or pneumothoraces. No significant periaortic, pretracheal, parahilar or subcarinal adenopathy. The esophagus is noted to be mildly ectatic in its proximal third. There is there is some associated dilatation of the proximal third with debris noted. Findings may represent an area of impaction. No surrounding extraluminal air or fluid is noted within  the region to suggest darline perforation. . An esophageal diverticulum was not excluded this point. The field-of-view visualized abdominal contents show partially visualized images of the kidneys which suggest atrophy. There are bilateral areas low-attenuation the largest the left measures 2.4 cm and the largest on the right which is exophytic at the posterior pole region measures 1.7 cm. These are of low-attenuation but not fully characterized on this noncontrast study. Visualized osseous structures show multilevel degenerative changes of the thoracic spine     1. THE PROXIMAL ESOPHAGUS IS MILDLY ECTATIC. THERE IS AN AREA OF PROXIMAL. STILL DILATATION WITH DEBRIS NOTED. FINDINGS MAY REPRESENT AN AREA OF IMPACTION. THERE MAY BE A ASSOCIATED ESOPHAGEAL DIVERTICULUM. NO DARLINE PERFORATION. FURTHER EVALUATION RECOMMENDED. CONSIDER GI CONSULT   2. THE VISUALIZED PORTION OF KIDNEYS SHOW THAT THERE ARE ATROPHIC. THERE IS AREAS LOW-ATTENUATION BILATERALLY LIKELY RENAL CYST BUT NOT FULLY CHARACTERIZED IN THIS NONCONTRAST STUDY.  CORRELATE CLINICALLY AND FOLLOW-UP. OTHER FINDINGS AS DETAILED ABOVE          Mra Head Wo  2020  EXAM: MR angiography of the head without contrast. History: Meningioma Technique: Multiplanar multisequence MRI of the brain was performed. 3-D time-of-flight axial images were performed through the [place] [place]. 3-D volume rendered images of the [place] [place] performed. Comparison: MRI of the brain from 2000 Findings: The bilateral distal cervical internal carotid arteries through the skull base are patent. The bilateral middle cerebral arteries through the trifurcation and opercular branches are patent. The vertebrobasilar system including the superior cerebellar and posterior cerebral arteries are patent. Left vertebral artery is dominant. Posterior communicating arteries are patent. The bilateral anterior cerebral arteries and anterior communicating artery are patent. No aneurysm or high-grade stenosis of the visualized cerebral vasculature. No significant interval change in edema of the left frontal, parietal, and temporal lobes. No significant interval change of 6 mm of rightward midline shift. Again identified is a 2.3 cm mass within the anterior left middle cranial fossa as detailed on recent MRI of the brain. No aneurysm or high-grade stenosis, or vascular malformation of the visualized cerebral vasculature. No significant interval change in left frontal, parietal, and temporal lobe edema and 6 mm of rightward midline shift. Xr Chest  2020  Patient MRN: [id number] : 1945 Age:  69 years Gender: Female Order Date: 2020 8:45 AM. Exam: XR CHEST PORTABLE Number of Views: 1 Indication:  Altered mental status and dizziness, possible aspiration Comparison: None Findings: Cardiomediastinal silhouette is enlarged. Vascular calcifications thoracic aorta. Bibasilar airspace disease. Osteopenia. No pneumothorax.  Possible nodular abnormality left lower lung not well characterized, measured at approximately 1.4 cm     Impression: 01/01/2020    UROBILINOGEN 0.2 01/01/2020    BILIRUBINUR Negative 01/01/2020     Lab Results   Component Value Date    PROTIME 10.7 05/09/2017     Lab Results   Component Value Date    INR 1.0 05/09/2017         I discussed results with patient. The patient remains highly medically complex and continues to have severe problems with activities of daily living and mobility. The patient was assessed to be able to tolerate intensive rehabilitation and therefore was admitted to Rehabilitation to address these needs. Prior Function; everyday activities:     Social History     Socioeconomic History    Marital status:      Spouse name: Not on file    Number of children: Not on file    Years of education: Not on file    Highest education level: Not on file   Occupational History    Occupation: [de-identified]    Occupation: Caregiver to the elderly   Social Needs    Financial resource strain: Not very hard    Food insecurity:     Worry: Never true     Inability: Never true    Transportation needs:     Medical: No     Non-medical: No   Tobacco Use    Smoking status: Never Smoker    Smokeless tobacco: Never Used   Substance and Sexual Activity    Alcohol use: Never     Frequency: Never    Drug use: Never    Sexual activity: Not Currently     Partners: Male   Lifestyle    Physical activity:     Days per week: 0 days     Minutes per session: 0 min    Stress: Only a little   Relationships    Social connections:     Talks on phone: More than three times a week     Gets together: More than three times a week     Attends Catholic service: More than 4 times per year     Active member of club or organization: No     Attends meetings of clubs or organizations: Never     Relationship status:      Intimate partner violence:     Fear of current or ex partner: No     Emotionally abused: No     Physically abused: No     Forced sexual activity: No   Other Topics Concern    Not on file   Social History Narrative         Lives With: 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 She is a [address], [place]. 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 Type of Home: House Multi-level, Bed/Bath upstairs    Home Access: Stairs to enter with rails    Entrance Stairs - Number of Steps: 2    Bathroom Shower/Tub: Tub/Shower unit    Bathroom Equipment: Shower chair    Home Equipment: Rolling walker    ADL Assistance: Independent    Homemaking Assistance: Independent    Homemaking Responsibilities: (Pt reports that she was independent)    Ambulation Assistance: Independent    Transfer Assistance: Independent    Active : No    Additional Comments: has 2 dtrs that stop in daily    Social/Functional History          Social supports listed above. Prior Device(s) used:  As above    History of falls:  Rarely falls    In depth analysis of complex functional data; the patient has been:    Current Rehabilitation Assessments:    Physical therapy: FIMS:  Bed Mobility: Scooting: Modified independent    Transfers:Sit to Stand: Supervision  Stand to sit: Supervision  Bed to Chair: Supervision, Ambulation 1  Surface: level tile, carpet  Device: No Device  Assistance: Stand by assistance, Contact guard assistance  Quality of Gait: narrow POOJA with occasional scissoring. Deviates out of st path. Distance: 150ft, Stairs  # Steps : 8  Stairs Height: 6\"  Rails: Right ascending  Assistance: Stand by assistance  Comment: Slow completion. Reciprocal pattern. FIMS:  , , Assessment: Pt presents with impaired coordination and gaze stabilization which has negatively impacted her functional mobility and increases her risk of falls. Continued PT indicated to progress mobility and facilitate DC at highest level of indep and safety.      Occupational therapy: FIMS:   ,  ,    Team rounds note scanned into chart      Speech therapy: FIMS: Refer to team rounds note scanned to the chart      Prior to admission patient was independent with all ADLs and mobilityand did not require any outside services.        Past Medical History:   Diagnosis Date    Anxiety     Cardiomegaly     Chronic kidney disease     Colon polyps     Depression     Gallbladder polyp     GERD (gastroesophageal reflux disease)     Gout     Gout     Hypertension     Hyperuricemia     Hypothyroid     Vitamin D deficiency        Past Surgical History:   Procedure Laterality Date    CRANIOTOMY N/A 1/3/2020    CRANIOTOMY FOR EXCISION OF BRAIN TUMOR performed by Jonathan Weiner MD at [address] [place] [address] Left     KIDNEY TRANSPLANT  03/03/2018    UPPER GASTROINTESTINAL ENDOSCOPY N/A 1/6/2020    EGD performed by Eddie Bradford MD at [facility] [place]       Current Facility-Administered Medications   Medication Dose Route Frequency Provider Last Rate Last Dose    enoxaparin (LOVENOX) injection 40 mg  40 mg Subcutaneous Daily Seretha Leghorn, APRN - CNP   40 mg at 01/10/20 0751    magnesium hydroxide (MILK OF MAGNESIA) 400 MG/5ML suspension 30 mL  30 mL Oral Daily PRN Seretha Leghorn, APRN - CNP        amLODIPine (NORVASC) tablet 5 mg  5 mg Oral Daily Seretha Leghorn, APRN - CNP   5 mg at 01/10/20 0748    cinacalcet (SENSIPAR) tablet 30 mg  30 mg Oral Daily Seretha Leghorn, APRN - CNP   30 mg at 01/10/20 1113    dexamethasone (DECADRON) 1 MG/ML solution 4 mg  4 mg Oral Daily Seretha Leghorn, APRN - CNP        DULoxetine (CYMBALTA) extended release capsule 30 mg  30 mg Oral Daily Seretha Leghorn, APRN - CNP   30 mg at 01/10/20 0749    levETIRAcetam (KEPPRA) tablet 750 mg  750 mg Oral BID Seretha Leghorn, APRN - CNP   750 mg at 01/10/20 2002    metoprolol tartrate (LOPRESSOR) tablet 25 mg  25 mg Oral BID Seretha Leghorn, APRN - CNP   25 mg at 01/10/20 2002    mycophenolate (CELLCEPT) capsule 500 mg  500 mg Oral BID Seretha Leghorn, APRN - CNP   500 mg at 01/10/20 2002    pantoprazole (PROTONIX) tablet 20 mg  20 mg Oral QAM AC Silver Jeyson, APRN - CNP   20 mg at 01/11/20 0600    predniSONE (DELTASONE) tablet 5 mg  5 mg Oral Daily Silver Jeyson, APRN - CNP   5 mg at 01/10/20 0748    sulfamethoxazole-trimethoprim (BACTRIM;SEPTRA) 400-80 MG per tablet 1 tablet  1 tablet Oral Daily Silver Jeyson, APRN - CNP   1 tablet at 01/10/20 1112    tacrolimus (PROGRAF) capsule 2 mg  2 mg Oral QPM Silver Jeyson, APRN - CNP   2 mg at 01/10/20 2003    tacrolimus (PROGRAF) capsule 3 mg  3 mg Oral QAM Silver Jeyson, APRN - CNP   3 mg at 01/10/20 0749    traMADol (ULTRAM) tablet 50 mg  50 mg Oral Q6H PRN Silver Jeyson, APRN - CNP        acetaminophen (TYLENOL) tablet 650 mg  650 mg Oral Q4H PRN Winsome Sauger, DO        fleet rectal enema 1 enema  1 enema Rectal Daily PRN Ivana Scullin, DO        lactulose (CHRONULAC) 10 GM/15ML solution 20 g  20 g Oral Daily PRN Ivana Scullin, DO           Allergies   Allergen Reactions    Benadryl [Diphenhydramine]      Unknown      Chlorhexidine      unknown       Social History     Socioeconomic History    Marital status:      Spouse name: Not on file    Number of children: Not on file    Years of education: Not on file    Highest education level: Not on file   Occupational History    Occupation: [de-identified]    Occupation: Caregiver to the elderly   Social Needs    Financial resource strain: Not very hard    Food insecurity:     Worry: Never true     Inability: Never true    Transportation needs:     Medical: No     Non-medical: No   Tobacco Use    Smoking status: Never Smoker    Smokeless tobacco: Never Used   Substance and Sexual Activity    Alcohol use: Never     Frequency: Never    Drug use: Never    Sexual activity: Not Currently     Partners: Male   Lifestyle    Physical activity:     Days per week: 0 days     Minutes per session: 0 min    Stress:  Only a little   Relationships    Social connections:     Talks on phone: More than three times a week     Gets together: More than three times a week     Attends Yazidi service: More than 4 times per year     Active member of club or organization: No     Attends meetings of clubs or organizations: Never     Relationship status:     Intimate partner violence:     Fear of current or ex partner: No     Emotionally abused: No     Physically abused: No     Forced sexual activity: No   Other Topics Concern    Not on file   Social History Narrative         Lives With: 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 She is a [address], [place]. 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 Type of Home: House Multi-level, Bed/Bath upstairs    Home Access: Stairs to enter with rails    Entrance Stairs - Number of Steps: 2    Bathroom Shower/Tub: Tub/Shower unit    Bathroom Equipment: Shower chair    Home Equipment: Rolling walker    ADL Assistance: Independent    Homemaking Assistance: Independent    Homemaking Responsibilities: (Pt reports that she was independent)    Ambulation Assistance: Independent    Transfer Assistance: Independent    Active : No    Additional Comments: has 2 dtrs that stop in daily    Social/Functional History             Today I evaluated this patient for periodic reassessment of medical and functional status. The patient was discussed in detail at the treatment team meeting focusing on current medical issues, progress in therapies, social issues, psychological issues, barriers to progress and strategies to address these barriers, and discharge planning. See the hand written addendum to rehab progress note. The patient continues to be high risk for future disability and their medical and rehabilitation prognosis continue to be good and therefore, we will continue the patient's rehabilitation course as planned.   The patient's tentative discharge date was set. Patient and family education was discussed. The patient was made aware of the team discussion regarding their progress. FAMILY HISTORY:  Does not pertain tochief complaint. Review of Systems   Constitutional: Positive for fatigue. Negative for diaphoresis and fever. Eyes: Positive for pain. Respiratory: Negative for shortness of breath. Cardiovascular: Negative for chest pain, palpitations and near-syncope. Gastrointestinal: Negative for abdominal pain, anorexia, bowel incontinence, nausea and vomiting. Genitourinary: Negative for bladder incontinence. Musculoskeletal: Positive for neck pain. Negative for back pain. Neurological: Positive for dizziness, focal weakness, light-headedness, headaches and loss of balance. Negative for vertigo and syncope. Psychiatric/Behavioral: Positive for confusion. Negative for memory loss. Objective  /66   Pulse 84   Temp 97 °F (36.1 °C) (Oral)   Resp 18   Ht 5' 1\" (1.549 m)   Wt 139 lb 12.4 oz (63.4 kg)   SpO2 95%   BMI 26.41 kg/m² *    Physical Exam  Constitutional:       General: She is not in acute distress. Appearance: She is well-developed. She is ill-appearing. She is not toxic-appearing or diaphoretic. HENT:      Head: Normocephalic. Not macrocephalic and not microcephalic. No raccoon eyes or Caraballo's sign. Mouth/Throat:      Pharynx: Oropharyngeal exudate present. Eyes:      General: No scleral icterus. Right eye: No discharge. Left eye: No discharge. Conjunctiva/sclera: Conjunctivae normal.      Pupils: Pupils are equal, round, and reactive to light. Neck:      Musculoskeletal: Normal range of motion. Spinous process tenderness and muscular tenderness present. Thyroid: No thyromegaly. Vascular: Normal carotid pulses. No carotid bruit, hepatojugular reflux or JVD. Trachea: No tracheal deviation. Cardiovascular:      Heart sounds: Heart sounds are distant. remote memory. Judgment: Judgment is not impulsive or inappropriate. Back Exam     Muscle Strength   Right Quadriceps:  4/5   Left Quadriceps:  4/5   Right Hamstrings:  4/5   Left Hamstrings:  4/5           Neurologic Exam     Mental Status   Oriented to person, place, and time. Follows 2 step commands. Attention: decreased. Concentration: decreased. Level of consciousness: alert  Knowledge: inconsistent with education. Able to name object. Able to read. Able to repeat. Able to write. Abnormal comprehension. Cranial Nerves     CN III, IV, VI   Pupils are equal, round, and reactive to light.     Motor Exam   Muscle bulk: decreased  Overall muscle tone: normal    Strength   Right neck flexion: 4/5  Left neck flexion: 4/5  Right neck extension: 4/5  Left neck extension: 4/5  Right deltoid: 4/5  Left deltoid: 4/5  Right biceps: 4/5  Left biceps: 4/5  Right triceps: 4/5  Left triceps: 4/5  Right wrist flexion: 4/5  Left wrist flexion: 4/5  Right wrist extension: 4/5  Left wrist extension: 4/5  Right interossei: 4/5  Left interossei: 4/5  Right abdominals: 4/5  Left abdominals: 4/5  Right iliopsoas: 4/5  Left iliopsoas: 4/5  Right quadriceps: 4/5  Left quadriceps: 4/5  Right hamstrin/5  Left hamstrin/5  Right glutei: 4/5  Left glutei: 4/5  Right anterior tibial: 4/5  Left anterior tibial: 4/5  Right posterior tibial: 4/5  Left posterior tibial: 4/5  Right peroneal: 4/5  Left peroneal: 4/5  Right gastroc: 4/5  Left gastroc: 4/5    Sensory Exam   Right arm light touch: decreased from fingers  Left arm light touch: decreased from fingers  Right leg light touch: decreased from ankle  Left leg light touch: decreased from ankle    Gait, Coordination, and Reflexes     Reflexes   Right brachioradialis: 1+  Left brachioradialis: 1+  Right biceps: 1+  Left biceps: 1+  Right triceps: 1+  Left triceps: 1+  Right patellar: 0  Left patellar: 0  Right achilles: 0  Left achilles: 0      After extensive review of the records and above physical exam, I have formulated the followingdiagnoses and plan:      DIAGNOSES:    1. 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 Compared to Pre-Admission Assessment, patients medical and functional status remain challengingly complex and patient continues to requireintensive therapeutic intervention from multiple therapies, therefore, initiate acute intensive comprehensive inpatient rehabilitation program including PT/OT to improve balance, ambulation, ADLs, and to improve the P/AROM. Functional and medical status reassessed regarding patients ability to participate in therapies and patient found to be able to participate in acute intensivecomprehensive inpatient rehabilitation program.  Therapeutic modifications regarding activities in therapies, place, amount of time per day and intensity of therapy made daily. Enroll in acute course of therapy program to include 1 1/2 hours per day of PT 5 to 7days per week and 1 1/2 hours per day of OT 5 to 7 days per week, and   SLP and Rec T 1/2 hour per day 3-5 days per week. The patient is stable medically and physically on previous exam.       This patient present with significant new onset decreased mobility andinability to perform activities of daily living skills independently and is at significant risk for prolonged disability  For this reason they have been admitted to [facility]. Thepatient's current functional and medical status are highly complex but the patient is able to participate in intensive rehabilitation. A comprehensive inpatient rehabilitation program is appropriate.   The patient Buffy Ax initial evaluation by the rehabilitation team and be discussed at regular treatment team meetings to assess progress, mobility, self care, mood and discharge issues. Physical therapy will be consulted for mobilityand endurance issues and should be performed 1 to 2 times per day, 7 days per week for the length of stay. Occupational therapy will be consulted for activities of daily living and should be performed 1 to 2 times per day,7 days per week for the length of stay. Their capacity to participate at an acute level, decision to be treated in the gym, room or on the unit, their activity goals for the day and the number of minutes of activeparticipation will be reassessed and re-prescribed daily. Because this patient is medically complex, I will check a CBC, BMP, UA and orthostatic blood pressures. They will be reassessed daily regarding their ability toparticipate in an acute level rehabilitation program.  Recreational Therapy will be consulted for community re-entry and adjustment to disability. Communication, cognitive and emotional issues will also be addressed duringthis rehabilitation stay by rehabilitation psychologist or speech therapist as appropriate. I reviewed the patient's old and current charts and discussed other rehabilitation options with the rehabilitation teamincluding the rehab RN and the admission team as well as the patient. I feel that the patient's functional recovery would be best served at an acute inpatient rehabilitation program because the patient needs intense therapythree hours per day, direct RN supervision and daily monitoring by a physician for medical status. This cannot be sufficiently provided by home health care, a skilled nursing facility or in an outpatient setting. I furtherfeel that the patient has the potential to improve functional abilities in an acute intensive rehabilitation program.    Old records were reviewed and summarized.     2.  Other diagnoses which complicate rehabilitation stay include:     Principal Problem:

## 2020-01-10 NOTE — PROGRESS NOTES
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   [facility] admit 20. 2020    Cardiomegaly 2020    Hypothyroid 2020    Vitamin D deficiency 2020    BMI 27.0-27.9,adult 2020    Hx of kidney transplant 2020    Generalized seizure ([address]     Aphasia     Cerebral malignant neoplasm ([address] 2020    Brain neoplasm ([address] 2020    Depression, major, recurrent, moderate (HCC) 10/08/2018    Postherpetic neuralgia 2018    Anxiety 2018    Esophageal disorder 2018    Pain in thoracic spine 07/15/2018    Epigastric abdominal pain 2018    Immunosuppressive management encounter following kidney transplant 2018    History of colonic polyps 2017    Chronic pain disorder 2017    Acid reflux 2017    Angina, class IV ([address] 2017    Mixed dyslipidemia 2017    Elevated blood uric acid level 2017    Gout 2015    Combined fat and carbohydrate induced hyperlipemia 2015    End stage kidney disease ([address] 10/27/2011    Essential hypertension 10/27/2011       Past Medical History:   Diagnosis Date    Anxiety     Cardiomegaly     Chronic kidney disease     Colon polyps     Depression     Gallbladder polyp     GERD (gastroesophageal reflux disease)     Gout     Gout     Hypertension     Hyperuricemia     Hypothyroid     Vitamin D deficiency      Past Surgical History:   Procedure Laterality Date    CRANIOTOMY N/A 1/3/2020    CRANIOTOMY FOR EXCISION OF BRAIN TUMOR performed by Bala Mcdonald MD at [address] [place] [address] Left     KIDNEY TRANSPLANT  03/03/2018    UPPER GASTROINTESTINAL ENDOSCOPY N/A 1/6/2020    EGD performed by Emerson Gan MD at [facility]       Chart Reviewed: Yes  Family / Caregiver Present: No  Diagnosis: 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 \"    Pre Treatment Pain Screening  Comments / Details: denies    Post Treatment Pain Screening:  Pain Assessment  Pain Assessment: (denies)    Prior Level of Function:  Social/Functional History  Lives With: Daughter(works days at post office)  Type of Home: House  Home Layout: Multi-level, Bed/Bath upstairs(9 steps with HR to second floor)  Home Access: Stairs to enter with rails  Entrance Stairs - Number of Steps: 2  ADL Assistance: Independent  Homemaking Assistance: Independent  Ambulation Assistance: Independent(without AD)  Transfer Assistance: Independent  Additional Comments: Pt states that she is home alone while her daughter works. Has another daughter local and 4 grandchildren of varying ages    OBJECTIVE:   Vision/Hearing:  Vision: (Impaired smooth pursuits and VOR; attends to full visual field when cued; difficult to assess peripheral vision accurately )  Vision Exceptions: Wears glasses for reading  Hearing: Within functional limits    Cognition:  Overall Orientation Status: Within Functional Limits  Follows Commands: Within Functional Limits  Observation/Palpation  Observation: No acute distress noted. Pt pleasant and motivated.      ROM:  RLE PROM: WFL  LLE PROM: WFL    Strength:  Strength RLE  Strength RLE: WNL  Strength 10  Picking up Objects from Standing Position: Supervision or Touching Assistance - 4  Stairs: Yes  WC Mobility: No Not Applicable (pt did not complete item prior to admission) - 9    ASSESSMENT:  Body structures, Functions, Activity limitations: Decreased functional mobility ; Decreased balance;Decreased coordination;Decreased vision/visual deficit  Decision Making: Medium Complexity  History: High  Exam: High  Clinical Presentation: Med    Prognosis: Good  PT Education: Goals;PT Role;Plan of Care;General Safety  Barriers to Learning: language barrier      CLINICAL IMPRESSION: Pt presents with impaired coordination and gaze stabilization which has negatively impacted her functional mobility and increases her risk of falls. Continued PT indicated to progress mobility and facilitate DC at highest level of indep and safety.      PLAN OF CARE:  Frequency: 1-2 treatment sessions per day, 5-7 days per week     Current Treatment Recommendations: Strengthening, Functional Mobility Training, Neuromuscular Re-education, Home Exercise Program, Equipment Evaluation, Education, & procurement, Transfer Training, Safety Education & Training, Balance Training, Endurance Training, Stair training, Patient/Caregiver Education & Training, Cognitive/Perceptual Training, Gait Training    Patient's Goal:  Get back home    GOALS:  Short term goals  Short term goal 1: Pt to complete HEP with indep  Short term goal 2: Pt to achieve 19/24 DGI to demonstrate improved balance and decreased falls risk  Long term goals  Long term goal 1: Pt to complete bed mobility indep throughout program  Long term goal 2: Pt to complete all transfers with indep  Long term goal 3: Pt to ambulate 300ft+ without AD indep on level and unlevel surfaces  Long term goal 4: Pt to manage 12 steps with HR and indep    ELOS:   Plan weeks: 1 wk    Therapy Time:    Individual   Time In 0930   Time Out 1000   Minutes 30     8 Minutes([facility])       Sb Herrera PT, 01/10/20 at 10:29 AM

## 2020-01-10 NOTE — PROGRESS NOTES
CRANIOTOMY FOR EXCISION OF BRAIN TUMOR performed by Jonathan Weiner MD at [address] Connector Left     KIDNEY TRANSPLANT  03/03/2018    UPPER GASTROINTESTINAL ENDOSCOPY N/A 1/6/2020    EGD performed by Eddie Bradford MD at [address]: 1/1/20    Date of Evaluation: 1/10/2020   Evaluating Therapist: JOY Coles    Assessment:  Cognitive Diagnosis: Pt presents with mild cognitive linguistic impairment characterized by decreased auditory comprehension and decreased working memory. Results taken from evaluation with use of  on 1/8/20   Diagnosis: mild cognitive impairment    Recommendations:  Requires SLP Intervention: Yes  Duration/Frequency of Treatment: 2-3x/week during LOS or until goals met             Goals:  Short-term Goals  Timeframe for Short-term Goals: 1-2 weeks  Goal 1: Pt will follow 2-3 step directions given orally with 85% accuracy with min cues to increase the pt's ability to follow directions provided by caregivers for safe follow through with ADLs. Goal 2: Pt will answer mid-high level Wh- questions with 85% accuracy with min cues to assist the caregiver in obtaining important information regarding the patient's personal, medical, and safety needs. Goal 3: To address pt's cognitive deficits and promote recall of personal and medical information, pt will answer questions addressing (remote, recent, delayed) recall with 80% accuracy and stand by cues. Goal 4: Pt will complete RIPA in 1-5 days to guide POC and treatment with use of  services  Long-term Goals  Timeframe for Long-term Goals: 1-2 weeks  Goal 1: Pt will improve her Receptive Language abilities to a modified independent level for comprehension of conversation and safety directions with familiar and unfamiliar communication partners. Goal 2: Pt will improve her Cognition from min assist to supervision for adequate functional recall and safety awareness for home. Gonzalo Lua Patient/family involved in developing goals and treatment plan: pt only    Subjective:   Previous level of function and limitations: pt reports no deficits  General  Chart Reviewed: Yes  Family / Caregiver Present: No  Subjective  Subjective: Alert, Cooperative, and Pleasant. Unable to find working  ipad. Understood simple directions. Social/Functional History  Lives With: Daughter  Type of Home: House  ADL Assistance: Independent  Homemaking Assistance: Independent  Ambulation Assistance: Independent  Transfer Assistance: Independent  Active : No  Additional Comments: Pt states that she is home alone while her daughter works. Has another daughter local and 4 grandchildren of varying ages  Vision  Vision: Impaired  Vision Exceptions: Wears glasses for reading  Hearing  Hearing: Within functional limits           Objective:     Oral/Motor  Oral Motor: Within functional limits    Auditory Comprehension  Yes/No Questions: Mild( MTDD from )  ID Letters: 100% acc  ID colors: 100% acc  Two Step Basic Commands: Mild(2/4x on  with )  Complex/Abstract Commands: Mild  Conversation: Mild  Interfering Components: Working memory; Hearing         Expression  Primary Mode of Expression: Verbal    Verbal Expression  Confrontation namin% acc with use of gestures when couldn't say english words  Verbal Expression: Within functional limits         Motor Speech  Motor Speech:  Within Functional Limits    Pragmatics/Social Functioning  Pragmatics: Within functional limits    Cognition:      Orientation  Overall Orientation Status: Within Functional Limits  Attention  Attention: Within Functional Limits  Memory  Memory: Exceptions to WFL(pt reported decreased recall of engligh language since admission, reported this on  with use of )  Short-term Memory: (recalled 3/3 words after 5 minute delay with use of  on )  Working Memory: Mild(retention of 2-3 step directions with  on )  Problem Solving  Problem Solving: Within Functional Limits  Numeric Reasoning  Numeric Reasoning: Unable to assess  Safety/Judgement  Safety/Judgement: Within Functional Limits(verbalized use of call light and showed SLP call light in room)    Additional Assessments:             Prognosis:  Speech Therapy Prognosis  Prognosis: Good  Individuals consulted  Consulted and agree with results and recommendations: Patient;RN(Tone)    Education:  Patient Education: Pt educated on results   Patient Education Response: Verbalizes understanding  Safety Devices in place: Yes  Type of devices:  All fall risk precautions in place    Pain:  Pain Assessment  Patient Currently in Pain: Denies  Pain Assessment: (denies)  Pain Level: 0    Comprehension:  5- Supervision/Stand by cues    Expression:  6- Modified Independent    Problem Solvin- Supervision/Stand by cues    Memory:  5- Supervision/Stand by cues             Therapy Time  SLP Individual Minutes  Time In: 1115  Time Out: 1130  Minutes: 15          Signature: Electronically signed by JOY Logan on 1/10/2020 at 11:47 AM

## 2020-01-10 NOTE — PROGRESS NOTES
Physical Therapy Rehab Treatment Note  [facility]/Department: [facility]  [facility]       NAME: Ana Paula Fierro  : 1945 (69 y.o.)  MRN: [id number]  CODE STATUS: Full Code    Date of Service: 1/10/2020     Restrictions:  Restrictions/Precautions: Fall Risk, Seizure    SUBJECTIVE:          Post Treatment Pain Screenin/10     OBJECTIVE:   Overall Orientation Status: Within Functional Limits  Follows Commands: Within Functional Limits             Transfers  Sit to Stand: Supervision  Stand to sit: Supervision    Ambulation  Ambulation?: Yes  Ambulation 1  Surface: level tile;carpet  Device: No Device  Assistance: Stand by assistance;Contact guard assistance  Quality of Gait: narrow POOJA with occasional scissoring. Deviates out of st path. Distance: 150ft    Stairs/Curb  Stairs?: Yes  Stairs  # Steps : 8  Stairs Height: 6\"  Rails: Right ascending  Assistance: Stand by assistance        Exercises  Neurodevelopmental Techniques: Gait drills: F/R/L without UE support. Obstacle course stepping over and around obstacles. Ambulating around dept locating and retreiving cones to challange balance with head turns. ASSESSMENT/COMMENTS:  Body structures, Functions, Activity limitations: Decreased functional mobility ; Decreased balance;Decreased coordination;Decreased vision/visual deficit  PLAN OF CARE/Safety:   Safety Devices  Type of devices:  All fall risk precautions in place    Therapy Time:   Individual   Time In 0   Time Out 1600   Minutes 30     Minutes:        Transfer/Bed mobility trainin      Gait training:15      Neuro re education:15     Therapeutic ex:0    Zaheer Handy PTA, 01/10/20 at 4:24 PM

## 2020-01-10 NOTE — PROGRESS NOTES
Occupational Therapy  Facility/Department: [facility]  Daily Treatment Note  NAME: Christian Medina  : 1945  MRN: [id number]    Date of Service: 1/10/2020    Discharge Recommendations:  Continue to assess pending progress    Assessment   Activity Tolerance: Patient Tolerated treatment well  Activity Tolerance: fair  Safety Devices  Safety Devices in place: Yes  Type of devices: All fall risk precautions in place           Patient Diagnosis(es): There were no encounter diagnoses. has a past medical history of Anxiety, Cardiomegaly, Chronic kidney disease, Colon polyps, Depression, Gallbladder polyp, GERD (gastroesophageal reflux disease), Gout, Gout, Hypertension, Hyperuricemia, Hypothyroid, and Vitamin D deficiency. has a past surgical history that includes Dialysis fistula creation (Left); Kidney transplant (2018); craniotomy (N/A, 1/3/2020); and Upper gastrointestinal endoscopy (N/A, 2020). Restrictions  Restrictions/Precautions  Restrictions/Precautions: Fall Risk, Seizure     Subjective  General  Chart Reviewed: Yes  Response to previous treatment: Patient with no complaints from previous session  Family / Caregiver Present: No  Referring Practitioner: Dr Beatrice Moody  Diagnosis: NTBI secondary to craniotomy for removal of left temporal anterior intracerebral tumor  Pre Treatment Pain Screening  Pain at present: 0  Scale Used: Numeric Score  Intervention List: Patient able to continue with treatment  Vital Signs  Patient Currently in Pain: No     Objective   ADL  Grooming: Setup(To perform hand hygiene while seated at sink. Set up with soap.  Unable to reach soap from [address])  Toileting: Supervision     Toilet Transfers  Toilet - Technique: Stand step(from )  Equipment Used: Grab bars  Toilet Transfer: Supervision  Toilet Transfers Comments: Pt used grab bars      Upper Extremity Function  UE Strengthing: Pt wore a 1/2#  wrist weight on her right wrist only (no left d/t fistula in wrist) to

## 2020-01-10 NOTE — PROGRESS NOTES
Occupational Therapy   Occupational Therapy Initial Assessment  Date: 1/10/2020   Patient Name: Ford Green  MRN: [id number]     : 1945    Date of Service: 1/10/2020    Discharge Recommendations:  Continue to assess pending progress       Assessment   Performance deficits / Impairments: Decreased functional mobility ; Decreased strength;Decreased endurance;Decreased high-level IADLs;Decreased ADL status; Decreased balance;Decreased cognition  Prognosis: Good  Decision Making: Medium Complexity  History: 6 complexities  Exam: 7 deficits  Assistance / Modification: SBA  REQUIRES OT FOLLOW UP: Yes  Activity Tolerance  Activity Tolerance: Patient Tolerated treatment well  Activity Tolerance: fair  Safety Devices  Safety Devices in place: Yes  Type of devices: All fall risk precautions in place  Restraints  Initially in place: No           Patient Diagnosis(es): There were no encounter diagnoses. has a past medical history of Anxiety, Cardiomegaly, Chronic kidney disease, Colon polyps, Depression, Gallbladder polyp, GERD (gastroesophageal reflux disease), Gout, Gout, Hypertension, Hyperuricemia, Hypothyroid, and Vitamin D deficiency. has a past surgical history that includes Dialysis fistula creation (Left); Kidney transplant (2018); craniotomy (N/A, 1/3/2020); and Upper gastrointestinal endoscopy (N/A, 2020).            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  Patient Currently in Pain: Denies  Pain Assessment  Pain Assessment: (denies)  Vital Signs  Patient Currently in Pain: Denies  Social/Functional History  Social/Functional History  Lives With: Daughter  Type of Home: House  Home Layout: Multi-level, Bed/Bath upstairs  Home Access: Stairs to enter with rails  Entrance Stairs - Number of Steps: 2  Bathroom Shower/Tub: Tub/Shower unit  Bathroom Equipment: Shower chair  Home Equipment: Rolling walker  ADL Assistance: Independent  Homemaking Assistance: Independent  Homemaking Responsibilities: Yes  Ambulation Assistance: Independent  Transfer Assistance: Independent  Active : No  Type of occupation: Reports that she worked as a   Leisure & Hobbies: TV  Additional Comments: Pt states that she is home alone while her daughter works. Has another daughter local and 4 grandchildren of varying ages       Objective   Vision: Impaired  Vision Exceptions: Wears glasses for reading  Hearing: Within functional limits    Orientation  Overall Orientation Status: Within Functional Limits  Observation/Palpation  Posture: Good  Observation: Pt's gead shaved with surgical scar above left temporal.  Bruising noted on forehead on left and right sides  Balance  Sitting Balance: Supervision  Standing Balance: Stand by assistance  Toilet Transfers  Toilet - Technique: Ambulating  Toilet Transfer: Supervision  Tub Transfers  Tub Transfers: Not tested  Shower Transfers  Shower - Transfer Type: To and From  Shower - Transfer To: Shower seat with back  Shower - Technique: Ambulating  Shower Transfers: Supervision  ADL  Feeding: Setup  Grooming: Setup  UE Bathing: Stand by assistance  LE Bathing: Stand by assistance  UE Dressing: Setup  LE Dressing: Setup  Toileting: Supervision  Tone RUE  RUE Tone: Normotonic  Tone LUE  LUE Tone: Normotonic  Coordination  Movements Are Fluid And Coordinated: No  Coordination and Movement description: Decreased speed;Left UE;Right UE;Tremors           Vision - Basic Assessment  Prior Vision: Wears glasses only for reading  Visual History: No significant visual history  Patient Visual Report: No visual complaint reported.   Visual Field Cut: No  Oculo Motor Control: Impaired  Impairments: Tracking  Cognition  Overall Cognitive Status: UE sensation and/or utilize compensatory techniques for safe completion of self-care as projected. []  Patient will improve static and dynamic standing balance to complete pants management at standing level     []  Patient will improve   UE Function (AROM, strength, motor control, tone normalization) to complete ADLs as projected. [x]  Patient will improve functional endurance to tolerate/complete 35-50 minutes of ADLs. [x]  Patient will improve bilateral UE strength and endurance to Good in order to participate in self-care activities as projected. []  Patient will improve   hand fine motor coordination to   in order to manage clothing fasteners/self-care containers in a timely manner     []  Patient will improve visual perception to   in order to improve participation in self care and leisure activities     [x]  Patient will perform kitchen mobility at device level without episodes of LOB and good safety awareness      [x]  Patient will perform basic room mobility at without device  level. [x]  Patient will access appropriate D/C site with as few architectural barriers as possible. []  Patient and/or caregiver will demonstrate understanding of hip precautions with   verbal/tactile cues. []  Patient and/or caregiver will demonstrate understanding of energy conservation techniques without verbal/tactile cues. [x]  Patient and/or caregiver will demonstrate understanding of recommended HEP for bilateral UE strength. [x]  Patient's cognition will improve to safely perform ADLs:  Comprehension: Mod I  Expression: Mod I  Social Interaction: Mod I  Problem Solving: Mod I  Memory:  Mod I        Therapy Time   Individual Concurrent Group Co-treatment   Time In 0830         Time Out 0930         Minutes 61                 ELSI Fernandez/L Electronically signed by ELSI Regalado/L on 0/94/20 at 11:35 AM

## 2020-01-10 NOTE — PLAN OF CARE
Problem: Coping:  Goal: Ability to cope will improve  Description  Ability to cope will improve  1/10/2020 1508 by Gina Landaverde RN  Outcome: Ongoing  1/10/2020 0158 by Lovely Malcolm. Roseline Finney RN  Outcome: Ongoing     Problem: Health Behavior:  Goal: Identification of resources available to assist in meeting health care needs will improve  Description  Identification of resources available to assist in meeting health care needs will improve  Outcome: Ongoing     Problem: Physical Regulation:  Goal: Complications related to the disease process, condition or treatment will be avoided or minimized  Description  Complications related to the disease process, condition or treatment will be avoided or minimized  Outcome: Ongoing     Problem: Role Relationship:  Goal: Ability to communicate needs accurately will improve - ADL needs  Description  Ability to communicate needs accurately will improve  Outcome: Ongoing     Problem: Skin Integrity:  Goal: Demonstration of wound healing without infection will improve  Description  Demonstration of wound healing without infection will improve  1/10/2020 1508 by Gian Landaverde RN  Outcome: Ongoing  1/10/2020 0158 by Lovely Malcolm. Roseline Finney RN  Outcome: Ongoing     Problem: Falls - Risk of:  Goal: Will remain free from falls  Description  Will remain free from falls  1/10/2020 1508 by Gina Landaverde RN  Outcome: Ongoing  1/10/2020 0158 by Lovely Malcolm. Roseline Finney RN  Outcome: Ongoing  Goal: Absence of physical injury  Description  Absence of physical injury  1/10/2020 1508 by Gina Landaverde RN  Outcome: Ongoing  1/10/2020 0158 by Lovely Malcolm.  Roseline Finney RN  Outcome: Ongoing     Problem: IP BALANCE  Goal: LTG - Patient will maintain balance to allow for safe/functional mobility  1/10/2020 1508 by Gina Landaverde RN  Outcome: Ongoing  1/10/2020 1030 by Zuly Hargrove PT  Outcome: Ongoing     Problem: IP COMMUNICATION/DYSARTHRIA  Goal: LTG - patient will improve expressive language skills to allow for communication of wants and needs in daily activities  1/10/2020 1508 by Shana Laguna RN  Outcome: Ongoing  1/10/2020 1155 by Kemi Love SLP  Outcome: Ongoing     Problem: IP SWALLOWING  Goal: LTG - patient will tolerate the least restrictive diet consistency to allow for safe consumption of daily meals  1/10/2020 1508 by Shana Laguna RN  Outcome: Ongoing  1/10/2020 1155 by Kemi Love, SLP  Outcome: Met This Shift

## 2020-01-10 NOTE — PROGRESS NOTES
[facility]   Facility/Department: [facility]  Speech Language Pathology  Clinical Bedside Swallow Evaluation    Pedro Pablo Mejia  : 1945 (75 y.o.)   MRN: [id number]  ROOM: [id number]/T729-47  ADMISSION DATE: 2020  PATIENT DIAGNOSIS(ES): Abnormality of gait and mobility [R26.9]  No chief complaint on file. Patient Active Problem List    Diagnosis Date Noted    Abnormality of gait and mobility due to 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   [facility] admit 20. 2020    Cardiomegaly 2020    Hypothyroid 2020    Vitamin D deficiency 2020    BMI 27.0-27.9,adult 2020    Hx of kidney transplant 2020    Generalized seizure ([address]     Aphasia     Cerebral malignant neoplasm ([address] 2020    Brain neoplasm ([address] 2020    Depression, major, recurrent, moderate (HCC) 10/08/2018    Postherpetic neuralgia 2018    Anxiety 2018    Esophageal disorder 2018    Pain in thoracic spine 07/15/2018    Epigastric abdominal pain 2018    Immunosuppressive management encounter following kidney transplant 2018    History of colonic polyps 2017    Chronic pain disorder 2017    Acid reflux 2017    Angina, class IV ([address] 2017    Mixed dyslipidemia 2017    Elevated blood uric acid level 2017    Gout 2015    Combined fat and carbohydrate induced hyperlipemia 2015    End stage kidney disease ([address] 10/27/2011    Essential hypertension 10/27/2011     Past Medical History:   Diagnosis Date    Anxiety     Cardiomegaly     Chronic kidney disease     Colon polyps     Depression     Gallbladder polyp     GERD (gastroesophageal reflux disease)     Gout     Gout     Hypertension     Hyperuricemia     Hypothyroid     Vitamin D deficiency      Past Surgical History:   Procedure Laterality Date    CRANIOTOMY N/A 1/3/2020 Pharyngeal: Pt presents with a suspected functional pharyngeal phase on all given PO trials with no s/s of aspiration or difficulty. Impression  Dysphagia Diagnosis: Swallow function appears grossly intact  Dysphagia Impression : Pt's swallow seems intact on this date with no s/s of aspiration on regular/thin trials. Dysphagia Outcome Severity Scale: Level 7: Normal in all situations     Treatment Plan  Requires SLP Intervention: No  Duration/Frequency of Treatment: N/A          Treatment/Goals  Short-term Goals  Goal 1: N/A  Long-term Goals  Goal 1: N/A    Prognosis  Prognosis  Prognosis for safe diet advancement: excellent  Individuals consulted  Consulted and agree with results and recommendations: Patient;RN    Education  Patient Education: Pt educated on results   Patient Education Response: Verbalizes understanding  Safety Devices in place: Yes  Type of devices:  All fall risk precautions in place    [x]  Zak Odell RN notified   [x]  Dr. Anushka Diaz notified via voicemail  [x]  OT/PT Departments notified via mailboxes    Pain:  Pain Assessment  Patient Currently in Pain: Denies  Pain Assessment: (denies)  Pain Level: 0       Therapy Time  SLP Individual Minutes  Time In: 1100  Time Out: 7603  Minutes: 15            Signature: Electronically signed by JOY Martell on 1/10/2020 at 11:54 AM

## 2020-01-10 NOTE — PROGRESS NOTES
[facility]   Acute  Rehabilitation  RECREATIONAL THERAPY    Recreation Therapy Note    Date:  1/10/2020        Patient Name: Mayi Stephens       MRN: [id number]        YOB: 1945 (75 y.o.)       Gender: female        RESTRICTIONS/PRECAUTIONS:  Restrictions/Precautions: Fall Risk, Seizure  Vision: (Impaired smooth pursuits and VOR; attends to full visual field when cued; difficult to assess peripheral vision accurately )  Hearing: Within functional limits    Time: 1025  Recreation Therapy referral received., Chart reviewed. , RT assessment started and Recreation Therapy services partially discussed with patient. Note: Met with pt. Pt able to indicate name and birthday. Pt in bed. No ipad  at bedside. CTRS used photographs depicting leisure pursuits. When shown picture of dog pt said she had two dogs. Other photos not useful at this time. iPad at nursing desk. Attempted to use it x2. Second attempt was a black screen with CTRS in bottom screen. No . Will attempt leisure assessment at a later time.      Electronically signed by: Chapin Szymanski  Date: 1/10/2020   Electronically signed by Chapin Szymanski on 1/10/2020 at 10:54 AM

## 2020-01-11 LAB
EKG ATRIAL RATE: 84 BPM
EKG P AXIS: 36 DEGREES
EKG P-R INTERVAL: 152 MS
EKG Q-T INTERVAL: 376 MS
EKG QRS DURATION: 94 MS
EKG QTC CALCULATION (BAZETT): 444 MS
EKG R AXIS: -34 DEGREES
EKG T AXIS: 112 DEGREES
EKG VENTRICULAR RATE: 84 BPM
TROPONIN: <0.01 NG/ML (ref 0–0.01)

## 2020-01-11 PROCEDURE — 1180000000 HC REHAB R&B

## 2020-01-11 PROCEDURE — 97112 NEUROMUSCULAR REEDUCATION: CPT

## 2020-01-11 PROCEDURE — 97530 THERAPEUTIC ACTIVITIES: CPT

## 2020-01-11 PROCEDURE — 84484 ASSAY OF TROPONIN QUANT: CPT

## 2020-01-11 PROCEDURE — 97116 GAIT TRAINING THERAPY: CPT

## 2020-01-11 PROCEDURE — 97535 SELF CARE MNGMENT TRAINING: CPT

## 2020-01-11 PROCEDURE — 99233 SBSQ HOSP IP/OBS HIGH 50: CPT | Performed by: PHYSICAL MEDICINE & REHABILITATION

## 2020-01-11 PROCEDURE — 6360000002 HC RX W HCPCS: Performed by: NURSE PRACTITIONER

## 2020-01-11 PROCEDURE — 93005 ELECTROCARDIOGRAM TRACING: CPT | Performed by: INTERNAL MEDICINE

## 2020-01-11 PROCEDURE — 97110 THERAPEUTIC EXERCISES: CPT

## 2020-01-11 PROCEDURE — 36415 COLL VENOUS BLD VENIPUNCTURE: CPT

## 2020-01-11 PROCEDURE — 6370000000 HC RX 637 (ALT 250 FOR IP): Performed by: NURSE PRACTITIONER

## 2020-01-11 RX ADMIN — PREDNISONE 5 MG: 5 TABLET ORAL at 10:01

## 2020-01-11 RX ADMIN — DULOXETINE HYDROCHLORIDE 30 MG: 30 CAPSULE, DELAYED RELEASE ORAL at 10:03

## 2020-01-11 RX ADMIN — MYCOPHENOLATE MOFETIL 500 MG: 250 CAPSULE ORAL at 10:03

## 2020-01-11 RX ADMIN — MYCOPHENOLATE MOFETIL 500 MG: 250 CAPSULE ORAL at 20:29

## 2020-01-11 RX ADMIN — MAGNESIUM HYDROXIDE 30 ML: 400 SUSPENSION ORAL at 20:33

## 2020-01-11 RX ADMIN — CINACALCET HYDROCHLORIDE 30 MG: 30 TABLET, FILM COATED ORAL at 15:35

## 2020-01-11 RX ADMIN — TACROLIMUS 3 MG: 1 CAPSULE ORAL at 10:04

## 2020-01-11 RX ADMIN — PANTOPRAZOLE SODIUM 20 MG: 20 TABLET, DELAYED RELEASE ORAL at 06:00

## 2020-01-11 RX ADMIN — LEVETIRACETAM 750 MG: 250 TABLET ORAL at 20:29

## 2020-01-11 RX ADMIN — AMLODIPINE BESYLATE 5 MG: 5 TABLET ORAL at 10:03

## 2020-01-11 RX ADMIN — METOPROLOL TARTRATE 25 MG: 25 TABLET, FILM COATED ORAL at 20:29

## 2020-01-11 RX ADMIN — METOPROLOL TARTRATE 25 MG: 25 TABLET, FILM COATED ORAL at 10:05

## 2020-01-11 RX ADMIN — TACROLIMUS 2 MG: 1 CAPSULE ORAL at 20:29

## 2020-01-11 RX ADMIN — LEVETIRACETAM 750 MG: 250 TABLET ORAL at 10:04

## 2020-01-11 RX ADMIN — SULFAMETHOXAZOLE AND TRIMETHOPRIM 1 TABLET: 400; 80 TABLET ORAL at 15:35

## 2020-01-11 RX ADMIN — DEXAMETHASONE INTENSOL 4 MG: 1 SOLUTION, CONCENTRATE ORAL at 10:02

## 2020-01-11 RX ADMIN — ENOXAPARIN SODIUM 40 MG: 40 INJECTION SUBCUTANEOUS at 10:05

## 2020-01-11 ASSESSMENT — PAIN DESCRIPTION - DESCRIPTORS
DESCRIPTORS: DULL;RADIATING
DESCRIPTORS: DULL

## 2020-01-11 ASSESSMENT — ENCOUNTER SYMPTOMS
NAUSEA: 0
SHORTNESS OF BREATH: 0
VOMITING: 0

## 2020-01-11 ASSESSMENT — PAIN SCALES - WONG BAKER
WONGBAKER_NUMERICALRESPONSE: 8
WONGBAKER_NUMERICALRESPONSE: 8

## 2020-01-11 ASSESSMENT — PAIN DESCRIPTION - LOCATION
LOCATION: CHEST
LOCATION: CHEST

## 2020-01-11 ASSESSMENT — PAIN SCALES - GENERAL
PAINLEVEL_OUTOF10: 0
PAINLEVEL_OUTOF10: 0

## 2020-01-11 ASSESSMENT — PAIN DESCRIPTION - ORIENTATION: ORIENTATION: LEFT;ANTERIOR

## 2020-01-11 NOTE — PROGRESS NOTES
Pt complain of chest pains. Vitals taken. RN notified and followed up. Electronically signed by Karen Collado LPN on 6/23/8025 at 1:77 AM    Pt has stated her pain has gotten less to a dull pain. She is resting with eyes closed at this time. Electronically signed by Karen Collado LPN on 2/13/9539 at 0:85 AM    Lab here for tropnins. Electronically signed by Karen Collado LPN on 3/47/0701 at 4:90 AM    Lungs clear. No pain with inhaling. At this time states pain is \"a little. \"  Woke shortly and is now relaxing with eyes closed. Electronically signed by Karen Collado LPN on 1/34/7846 at 1:99 AM    Morning meds given. Pt states pain in a little more than before but not pounding. Will continue to monitor. Electronically signed by Karen Collado LPN on 5/34/5351 at 62:10 AM    Pt states she is no longer having pains in her chest area. EKG done earlier. Troponins came back WNL.  Electronically signed by Karen Collado LPN on 9/06/4163 at 5:33 PM

## 2020-01-11 NOTE — PROGRESS NOTES
to the left frontal lobe and anterior to the left temporal lobe. Most likely postoperative. There is still effacement of the frontal, body and posterior horn of the left lateral ventricle with shift of the midline to the right of approximately 8 to 9 mm. The cisterns remain grossly unchanged. Again note is made of diffuse decreased attenuation throughout the subcortical white matter of the left frontal, parietal and temporal lobes most likely consistent with vasogenic edema. There are changes within the anterior aspect of the left middle cranial fossa which May reflect postoperative changes at site of tumor resection, removal.. The visualized portion of the paranasal sinuses, and mastoids are unremarkable. There are soft tissue changes in subcutaneous emphysema seen overlying left frontal parietal region and in the infratemporal fossa. Most likely postsurgical.     INTERVAL CRANIOTOMY WITH PROBABLE RESECTION REMOVAL OF LEFT anterior MIDDLE CRANIAL FOSSA TUMOR. PERSISTENT DIFFUSE VASOGENIC EDEMA OF THE LEFT FRONTAL PARIETAL TEMPORAL LOBES PERSISTENT EFFACEMENT OF THE LEFT LATERAL VENTRICLE WITH SHIFT OF MIDLINE TO THE RIGHT. OTHER FINDINGS DETAILED ABOVE      Ct Head Wo Contrast2020  Patient MRN: [id number] : 1945 Age:  69 years Order Date: 2020 8:45 AM. Gender: Female Exam: CT HEAD WO CONTRAST Number of Images: 141 Indication:   Change in mental status, cerebrovascular accident, hemorrhage Contrast dosage: None Comparison: None. Findings: This examination was performed on a CT scanner with dose reduction. Technique: Low-dose CT  acquisition technique included one of following options; 1 . Automated exposure control, 2. Adjustment of MA and or KV according to patient's size or 3. Use of iterative reconstruction. Large area of hypodensity left frontal lobe white matter, left parietal lobe, left temporal lobe, and left basal ganglia. There is 0.56 cm left to right subfalcine herniation.  No intraparenchymal hemorrhage definitively identified. No comparison studies. Pituitary gland and sellar/suprasellar regions are unremarkable. No Chiari malformation. . No acute fracture or lytic or blastic bony lesion. Optic globes and orbital contents unremarkable. Vascular calcifications within the bilateral internal carotid artery cavernous segments and the vertebral arteries. .     Findings communicated directly with Dr. Aleta Black  on 1/1/2020 9:25 AM . 1. Abnormal exam. Large area of hypodensity and edema with mass effect on left frontal, temporal and parietal lobes and left basal ganglia with left to right subfalcine herniation of 0.56 cm. Findings are concerning for potential underlying mass/malignancy with extensive edema and mass effect versus cerebrovascular infarction/accident with edema and mass effect. Further evaluation MRI of the brain with and without IV contrast is recommended. 2. Vascular calcifications within the bilateral internal carotid artery cavernous segments and the vertebral arteries. Ct Chest  1/4/2020  EXAMINATION:  CT SCAN CHEST. CLINICAL HISTORY:  Abnormal chest x-ray COMPARISON:  None TECHNIQUE:  Multiple serial axial images of the chest from the base neck through the upper abdomen with both sagittal coronal reconstruction was performed without intravenous or oral administration of contrast. FINDINGS:  There is a patchy mosaic appearance lung parenchyma that can be seen with small airway disease. The nodular density seen on the plain film may correspond to an area of atelectasis, scarring at the base of the left upper lobe extending to the pleura. No focal parenchymal abnormalities. There is an area of atelectasis, scarring base left lower lobe. No additional focal parenchymal abnormalities or pleural effusions or pneumothoraces. No significant periaortic, pretracheal, parahilar or subcarinal adenopathy. The esophagus is noted to be mildly ectatic in its proximal third.  There is there patent. The bilateral middle cerebral arteries through the trifurcation and opercular branches are patent. The vertebrobasilar system including the superior cerebellar and posterior cerebral arteries are patent. Left vertebral artery is dominant. Posterior communicating arteries are patent. The bilateral anterior cerebral arteries and anterior communicating artery are patent. No aneurysm or high-grade stenosis of the visualized cerebral vasculature. No significant interval change in edema of the left frontal, parietal, and temporal lobes. No significant interval change of 6 mm of rightward midline shift. Again identified is a 2.3 cm mass within the anterior left middle cranial fossa as detailed on recent MRI of the brain. No aneurysm or high-grade stenosis, or vascular malformation of the visualized cerebral vasculature. No significant interval change in left frontal, parietal, and temporal lobe edema and 6 mm of rightward midline shift. Xr Chest  2020  Patient MRN: [id number] : 1945 Age:  69 years Gender: Female Order Date: 2020 8:45 AM. Exam: XR CHEST PORTABLE Number of Views: 1 Indication:  Altered mental status and dizziness, possible aspiration Comparison: None Findings: Cardiomediastinal silhouette is enlarged. Vascular calcifications thoracic aorta. Bibasilar airspace disease. Osteopenia. No pneumothorax. Possible nodular abnormality left lower lung not well characterized, measured at approximately 1.4 cm     Impression:  1. Possible nodular abnormality left lower lung, not well characterized, measuring 1.4 cm, correlation with CT scan chest recommended. 2. Enlarged cardiomediastinal silhouette, the possibility of underlying pericardial effusion or other cardiac abnormalities are not excluded on the basis of this exam, clinical correlation recommended. . 3. Nonspecific bibasilar airspace disease, findings can be seen in infiltrate/pneumonia and/or atelectasis. Jack Espino  4. Vascular calcifications thoracic aorta     Mri Brain W Wo 2020  Patient MRN: [id number] : 1945 Age:  69 years Gender: Female Order Date: 2020 9:45 AM. Exam: MRI BRAIN W WO CONTRAST Number of Views: [id number] Indication:  Stroke Comparison: Brain MRI 2014. Head CT 2020. Contrast dosage: MultiHance, 7 mL, IV Findings: No evident restricted diffusion or acute/subacute cerebrovascular infarction/accident definitively identified. Pituitary gland and sellar/suprasellar regions are unremarkable. No Chiari malformation. . Hippocampal formations are symmetric in signal intensity and morphology bilaterally given the mass effect on the left compared with the normal-appearing right. No intrinsic noncontrast T1 shortening. T1 hypointense edema throughout the visualized left frontal, parietal, temporal lobe and basal ganglia. Abnormal T2 FLAIR hyperintensity throughout the left frontal, parietal and temporal lobe cortical and subcortical white matter and also extending in the left basal ganglia. Compression of the left lateral ventricular system. Approximately 0.56 cm left right subfalcine herniation. There is effacement of the left ambient cistern and the basilar cistern region. No tonsillar herniation. There is a dominant left vertebral artery, otherwise, normal expected appearance of visualized T2 flow voids. Optic globes and orbital contents are within normal limits. Visualized paranasal sinuses and mastoid air cells are free from fluid or mass. . No intraparenchymal hemorrhage. Pulsation artifact limits evaluation of the postcontrast exam and the cerebellum. Focal large enhancing mass in the left middle cranial fossa cyst left lung/left anterior temporal lobe, measuring approximately 2.3 cm anterior posterior by 2.1 cm transverse by 1.5 cm craniocaudal. No dural venous sinus thrombosis or occlusion is identified. No other enhancing mass is seen.      1. Enhancing mass/malignancy in the left middle cranial fossa/left anterior temporal lobe, findings suspicious for meningioma although glioblastoma is not excluded. There is extensive edema extending throughout the left frontal lobe, left parietal lobe and left basal ganglia/temporal lobe, without evidence of restricted diffusion, greater than expected. Findings could reflect extensive edema from the mass/malignancy. Other etiologies are not excluded. . Neurosurgical/neurological evaluation recommended. 2. Left to right subfalcine herniation of 0.56 cm. Neurosurgical consultation and evaluation is recommended. 3. Dominant left vertebral artery        Previous extensive, complex labs, notes and diagnostics reviewed and analyzed. ALLERGIES:    Allergies as of 01/09/2020 - Review Complete 01/09/2020   Allergen Reaction Noted    Benadryl [diphenhydramine]  05/08/2017    Chlorhexidine  05/08/2017      (please also verify by checking MAR)    Yesterday I evaluated this patient for periodic reassessment of medical and functional status. The patient was discussed in detail at the treatment team meeting focusing on current medical issues, progress in therapies, social issues, psychological issues, barriers to progress and strategies to address these barriers, and discharge planning. See the hand written addendum to rehab progress note. The patient continues to be high risk for future disability and their medical and rehabilitation prognosis continue to be good and therefore, we will continue the patient's rehabilitation course as planned. The patient's tentative discharge date was set. Patient and family education was discussed. The patient was made aware of the team discussion regarding their progress. Discharge plans were discussed along with barriers to progress and strategies to address these barriers, patient encouraged to continue to discuss discharge plans with . Complex Physical Medicine & Rehab Issues Assess & Plan:   1.  Severe abnormality of gait and mobility and impaired meeting every Monday to assess progress towards goals, discuss and address social, psychological and medical comorbidities and to address difficulties they may be having progressing in therapy. Patient and family education is in progress. The patient is to follow-up with their family physician after discharge. Complex Active General Medical Issues that complicate care Assess & Plan:    1. End stage kidney disease,   Hx of kidney transplant-limit toxic medications recheck BMP and CBC titrate Prograf and CellCept  2. Essential hypertension, occasional angina mixed dyslipidemia-vital signs every shift dose and titrate cardiac medications to include Norvasc Lopressor holding blood thinners postoperatively consult hospitalist for backup medical check troponins EKG was unremarkable 1/11/2020  3. Gout Elevated blood uric acid level-low purine diet when possible  4. Cerebral malignant neoplasm -status post resection follow-up with Dr. Rasheeda Barnes as an outpatient  5. Generalized seizure -add Keppra titrate Decadron for postop swelling  6. Postherpetic neuralgia-titrate pain medications using lowest effective dose  7. Epigastric abdominal pain-abdominal binder Lidoderm  8. Immunosuppressive management encounter following kidney transplant-vitamin B12 vitamin D co-Q10 strict handwashing  9. Pain in thoracic spine and generalized neuropathic pain-titrate Cymbalta, titrate Ultram and Tylenol and increased mattress  10. Vitamin D deficiency-bolus dose vitamin D  11. GERD-titrate Protonix monitor stools for blood  12.  Oral pharyngeal dysphasia-consult speech and language pathology add dental soft diet thin liquids           Brianna Sigala D.O., PM&R     Attending    [address]

## 2020-01-11 NOTE — PROGRESS NOTES
correct on first attempt. Patient completed 55 piece design with multiple curves with original design placed at midline. Patient placed a total of 43 pegs with 43 being correct on first attempt. Patient placed a total of 46 pegs with 46 being correct on second attempt. Patient with MIN errors with correct color choices of pegs. Patient engaged in therapeutic activity to increase B FM coordination/strengthening, B UE ROM/strengthening and cognition to increase I with ADL's, IADL's and transfers. Patient donned B 1 # wrist wts, able to reach in lateral and forward planes with 0 difficulty to assemble blocks and bolts design. Patient able to assemble 21 pc blocks and bolts design following visual design with MAX difficulty inserting bolts into blocks as patient inserted all but one bolt upside down. Patient with MIN difficulty threading wing nuts onto bolts. Patient noted to require MOD verbal cues for correct assembly. Plan   Plan  Times per week: 5-7x/wk 1-1 1/2 weeks  Current Treatment Recommendations: Strengthening, Functional Mobility Training, Home Management Training, Equipment Evaluation, Education, & procurement, Endurance Training, Balance Training, Neuromuscular Re-education, Self-Care / ADL, Safety Education & Training  G-Code     OutComes Score                                                  AM-PAC Score             Goals  Patient Goals   Patient goals :  To return to home when ready       Therapy Time   Individual Concurrent Group Co-treatment   Time In 1300         Time Out 1435         Minutes 95             ADL trainin minutes  Therapeutic activities: 70 minutes       Electronically signed by DOLORES Nogueira on 20 at 3:13 PM      DOLORES Nogueira

## 2020-01-11 NOTE — PROGRESS NOTES
EKG and Troponin level ordered. Patient c/o chest pain that started at 4 in the morning with a sharp pain. Patient is no c/o pain in her left chest radiating to her back. hospitalist aware.  Electronically signed by Parveen Champagne RN on 1/11/20 at 8:28 AM

## 2020-01-11 NOTE — PROGRESS NOTES
Occupational Therapy  Facility/Department: [facility]  Daily Treatment Note  NAME: Radha Marsh  : 1945  MRN: [id number]    Date of Service: 2020    Discharge Recommendations:  Continue to assess pending progress       Assessment      Activity Tolerance  Activity Tolerance: Patient Tolerated treatment well  Safety Devices  Safety Devices in place: Yes  Type of devices: All fall risk precautions in place         Patient Diagnosis(es): The primary encounter diagnosis was Cerebral malignant neoplasm ([facility] [address] 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 [facility] admit [facility]., Brain neoplasm [facility]), and Anxiety were also pertinent to this visit. has a past medical history of Anxiety, Cardiomegaly, Chronic kidney disease, Colon polyps, Depression, Gallbladder polyp, GERD (gastroesophageal reflux disease), Gout, Gout, Hypertension, Hyperuricemia, Hypothyroid, and Vitamin D deficiency. has a past surgical history that includes Dialysis fistula creation (Left); Kidney transplant (2018); craniotomy (N/A, 1/3/2020); and Upper gastrointestinal endoscopy (N/A, 2020). 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 Patient began to complain of chest pain during ADL. Therapist notified Francy Dang LPN.   BP taken

## 2020-01-12 PROCEDURE — 6370000000 HC RX 637 (ALT 250 FOR IP): Performed by: NURSE PRACTITIONER

## 2020-01-12 PROCEDURE — 1180000000 HC REHAB R&B

## 2020-01-12 PROCEDURE — 6370000000 HC RX 637 (ALT 250 FOR IP): Performed by: PHYSICAL MEDICINE & REHABILITATION

## 2020-01-12 PROCEDURE — 6360000002 HC RX W HCPCS: Performed by: NURSE PRACTITIONER

## 2020-01-12 PROCEDURE — 99232 SBSQ HOSP IP/OBS MODERATE 35: CPT | Performed by: PHYSICAL MEDICINE & REHABILITATION

## 2020-01-12 RX ORDER — CALCIUM CARBONATE 200(500)MG
500 TABLET,CHEWABLE ORAL 2 TIMES DAILY
Status: DISCONTINUED | OUTPATIENT
Start: 2020-01-12 | End: 2020-01-17 | Stop reason: HOSPADM

## 2020-01-12 RX ORDER — SUCRALFATE ORAL 1 G/10ML
1 SUSPENSION ORAL
Status: DISCONTINUED | OUTPATIENT
Start: 2020-01-12 | End: 2020-01-17 | Stop reason: HOSPADM

## 2020-01-12 RX ORDER — PANTOPRAZOLE SODIUM 20 MG/1
20 TABLET, DELAYED RELEASE ORAL
Status: DISCONTINUED | OUTPATIENT
Start: 2020-01-12 | End: 2020-01-17 | Stop reason: HOSPADM

## 2020-01-12 RX ORDER — LACTULOSE 10 G/15ML
30 SOLUTION ORAL DAILY
Status: COMPLETED | OUTPATIENT
Start: 2020-01-12 | End: 2020-01-12

## 2020-01-12 RX ADMIN — LEVETIRACETAM 750 MG: 250 TABLET ORAL at 10:09

## 2020-01-12 RX ADMIN — PANTOPRAZOLE SODIUM 20 MG: 20 TABLET, DELAYED RELEASE ORAL at 06:24

## 2020-01-12 RX ADMIN — SUCRALFATE 1 G: 1 SUSPENSION ORAL at 16:21

## 2020-01-12 RX ADMIN — MYCOPHENOLATE MOFETIL 500 MG: 250 CAPSULE ORAL at 10:08

## 2020-01-12 RX ADMIN — MYCOPHENOLATE MOFETIL 500 MG: 250 CAPSULE ORAL at 21:55

## 2020-01-12 RX ADMIN — ANTACID TABLETS 500 MG: 500 TABLET, CHEWABLE ORAL at 21:52

## 2020-01-12 RX ADMIN — DULOXETINE HYDROCHLORIDE 30 MG: 30 CAPSULE, DELAYED RELEASE ORAL at 10:10

## 2020-01-12 RX ADMIN — TACROLIMUS 3 MG: 1 CAPSULE ORAL at 10:08

## 2020-01-12 RX ADMIN — PANTOPRAZOLE SODIUM 20 MG: 20 TABLET, DELAYED RELEASE ORAL at 16:21

## 2020-01-12 RX ADMIN — TACROLIMUS 2 MG: 1 CAPSULE ORAL at 21:54

## 2020-01-12 RX ADMIN — METOPROLOL TARTRATE 25 MG: 25 TABLET, FILM COATED ORAL at 21:53

## 2020-01-12 RX ADMIN — METOPROLOL TARTRATE 25 MG: 25 TABLET, FILM COATED ORAL at 10:10

## 2020-01-12 RX ADMIN — LACTULOSE 30 G: 20 SOLUTION ORAL at 11:42

## 2020-01-12 RX ADMIN — LEVETIRACETAM 750 MG: 250 TABLET ORAL at 21:52

## 2020-01-12 RX ADMIN — DEXAMETHASONE INTENSOL 4 MG: 1 SOLUTION, CONCENTRATE ORAL at 10:07

## 2020-01-12 RX ADMIN — AMLODIPINE BESYLATE 5 MG: 5 TABLET ORAL at 10:10

## 2020-01-12 RX ADMIN — PREDNISONE 5 MG: 5 TABLET ORAL at 10:07

## 2020-01-12 RX ADMIN — SUCRALFATE 1 G: 1 SUSPENSION ORAL at 10:10

## 2020-01-12 RX ADMIN — CINACALCET HYDROCHLORIDE 30 MG: 30 TABLET, FILM COATED ORAL at 11:41

## 2020-01-12 RX ADMIN — SULFAMETHOXAZOLE AND TRIMETHOPRIM 1 TABLET: 400; 80 TABLET ORAL at 11:40

## 2020-01-12 RX ADMIN — ANTACID TABLETS 500 MG: 500 TABLET, CHEWABLE ORAL at 10:10

## 2020-01-12 ASSESSMENT — PAIN SCALES - GENERAL: PAINLEVEL_OUTOF10: 0

## 2020-01-12 NOTE — PROGRESS NOTES
Patient was up to the bathroom. She is not compliant with using her call light with both going or returning from the wash room. Pt was complaining with some indigestion or heart burn (mostly left sided). The head of the bed was propped up slightly soda crackers given with water. Call light with in reach and alarm on. Rounding and monitoring for safety through out the night.

## 2020-01-12 NOTE — PROGRESS NOTES
Subjective: The patient complains of severe  acute on chronic progressive balance deficits partially relieved by recent craniotomy with anterior tumor resection in the temporal area, PT, OT and exacerbated by poor sleep. I am concerned about patients complaints of chest pain in the a.m. of 1/11/2020 EKG is unremarkable troponins are pending. I am also concerned about her very poor balance. I have been concerned about her increased GERD and use of Lovenox. She is walking quite well and Lovenox will not be needed for DVT prophylaxis      I have encouraged patient to attend their adjustment to rehabilitation support group with rec therapy and rehabilitation psychology in order to improve their adjustments, well-being, and help their spiritual and cognitive recovery. ROS x10: The patient also complains of severely impaired mobility and activities of daily living. Otherwise no new problems with vision, hearing, nose, mouth, throat, dermal, cardiovascular, GI, , pulmonary, musculoskeletal, psychiatric or neurological. See Rehab H&P on Rehab chart dated . Vital signs:  BP (!) 157/77   Pulse 74   Temp 97 °F (36.1 °C) (Oral)   Resp 17   Ht 5' 1\" (1.549 m)   Wt 138 lb (62.6 kg)   SpO2 95%   BMI 26.07 kg/m²   I/O:   PO/Intake:  fair PO intake, no problems observed or reported. Bowel/Bladder:  continent, no problems noted. General:  Patient is well developed, adequately nourished, non-obese and     well kempt. HEENT:    PERRLA, hearing intact to loud voice, external inspection of ear     and nose benign. Inspection of lips, tongue and gums benign  Musculoskeletal: No significant change in strength or tone. All joints stable. Inspection and palpation of digits and nails show no clubbing,       cyanosis or inflammatory conditions. Neuro/Psychiatric: Affect: flat but pleasant. Alert and oriented to person, place and     situation.   No significant change in deep tendon reflexes or     sensation  Lungs:  CTA-B. Respiration effort is normal at rest.     Heart:   S1 = S2, RRR. No loud murmurs. Abdomen:  Soft, non-tender, no enlargement of liver or spleen. Extremities:  No significant lower extremity edema or tenderness. Skin:   Intact to general survey, no visualized or palpated problems. Healing craniotomy incision    Rehabilitation:  Physical therapy: FIMS:  Bed Mobility: Scooting: Modified independent    Transfers: Sit to Stand: Supervision  Stand to sit: Supervision  Bed to Chair: Supervision, Ambulation 1  Surface: level tile, carpet  Device: No Device  Assistance: Stand by assistance, Contact guard assistance  Quality of Gait: narrow POOJA with occasional scissoring. Deviates out of path. Occ reaching for side rail. Gait Deviations: Slow Marisa, Decreased step length, Decreased arm swing, Deviated path  Distance: 150ft  Comments: Occ VC's for environmental awareness in busy environment, Stairs  # Steps : 12  Stairs Height: 6\"  Rails: Right ascending  Assistance: Supervision  Comment: Steady pace. Reciprocal pattern. FIMS:  ,  , Assessment: Pt demonstrates good motivation and participation this AM despite c/o chest pain. Pt continues to have occ LOB with ambulation secondary to occ scissoring. Pt challenged this session with L/R head rotations during ambulation, min A required to correct occ LOB. Occupational therapy: FIMS:   ,  ,      Speech therapy: FIMS:        Lab/X-ray studies reviewed, analyzed and discussed with patient and staff:   Recent Results (from the past 24 hour(s))   Troponin    Collection Time: 01/11/20  9:05 AM   Result Value Ref Range    Troponin <0.010 0.000 - 0.010 ng/mL       Ct Head Wo  1/4/2020  CT HEAD WO CONTRAST CLINICAL HISTORY:  Postop COMPARISON: January 1, 2020.  Clinical note: Please see MRI report of brain January 1, 2024 additional details TECHNIQUE: Multiple unenhanced serial axial images of the brain from the vertex of the skull to the base of the skull were performed. FINDINGS: There is been interval left-sided craniotomy since prior examination. There is pneumocephaly seen anterior to the left frontal lobe and anterior to the left temporal lobe. Most likely postoperative. There is still effacement of the frontal, body and posterior horn of the left lateral ventricle with shift of the midline to the right of approximately 8 to 9 mm. The cisterns remain grossly unchanged. Again note is made of diffuse decreased attenuation throughout the subcortical white matter of the left frontal, parietal and temporal lobes most likely consistent with vasogenic edema. There are changes within the anterior aspect of the left middle cranial fossa which May reflect postoperative changes at site of tumor resection, removal.. The visualized portion of the paranasal sinuses, and mastoids are unremarkable. There are soft tissue changes in subcutaneous emphysema seen overlying left frontal parietal region and in the infratemporal fossa. Most likely postsurgical.     INTERVAL CRANIOTOMY WITH PROBABLE RESECTION REMOVAL OF LEFT anterior MIDDLE CRANIAL FOSSA TUMOR. PERSISTENT DIFFUSE VASOGENIC EDEMA OF THE LEFT FRONTAL PARIETAL TEMPORAL LOBES PERSISTENT EFFACEMENT OF THE LEFT LATERAL VENTRICLE WITH SHIFT OF MIDLINE TO THE RIGHT. OTHER FINDINGS DETAILED ABOVE      Ct Head Wo Contrast2020  Patient MRN: [id number] : 1945 Age:  69 years Order Date: 2020 8:45 AM. Gender: Female Exam: CT HEAD WO CONTRAST Number of Images: 141 Indication:   Change in mental status, cerebrovascular accident, hemorrhage Contrast dosage: None Comparison: None. Findings: This examination was performed on a CT scanner with dose reduction. Technique: Low-dose CT  acquisition technique included one of following options; 1 . Automated exposure control, 2. Adjustment of MA and or KV according to patient's size or 3. Use of iterative reconstruction.  Large area of hypodensity left frontal lobe white matter, left parietal lobe, left temporal lobe, and left basal ganglia. There is 0.56 cm left to right subfalcine herniation. No intraparenchymal hemorrhage definitively identified. No comparison studies. Pituitary gland and sellar/suprasellar regions are unremarkable. No Chiari malformation. . No acute fracture or lytic or blastic bony lesion. Optic globes and orbital contents unremarkable. Vascular calcifications within the bilateral internal carotid artery cavernous segments and the vertebral arteries. .     Findings communicated directly with Dr. Ag Miguel  on 1/1/2020 9:25 AM . 1. Abnormal exam. Large area of hypodensity and edema with mass effect on left frontal, temporal and parietal lobes and left basal ganglia with left to right subfalcine herniation of 0.56 cm. Findings are concerning for potential underlying mass/malignancy with extensive edema and mass effect versus cerebrovascular infarction/accident with edema and mass effect. Further evaluation MRI of the brain with and without IV contrast is recommended. 2. Vascular calcifications within the bilateral internal carotid artery cavernous segments and the vertebral arteries. Ct Chest  1/4/2020  EXAMINATION:  CT SCAN CHEST. CLINICAL HISTORY:  Abnormal chest x-ray COMPARISON:  None TECHNIQUE:  Multiple serial axial images of the chest from the base neck through the upper abdomen with both sagittal coronal reconstruction was performed without intravenous or oral administration of contrast. FINDINGS:  There is a patchy mosaic appearance lung parenchyma that can be seen with small airway disease. The nodular density seen on the plain film may correspond to an area of atelectasis, scarring at the base of the left upper lobe extending to the pleura. No focal parenchymal abnormalities. There is an area of atelectasis, scarring base left lower lobe. No additional focal parenchymal abnormalities or pleural effusions or pneumothoraces.  No significant periaortic, pretracheal, parahilar or subcarinal adenopathy. The esophagus is noted to be mildly ectatic in its proximal third. There is there is some associated dilatation of the proximal third with debris noted. Findings may represent an area of impaction. No surrounding extraluminal air or fluid is noted within  the region to suggest darline perforation. . An esophageal diverticulum was not excluded this point. The field-of-view visualized abdominal contents show partially visualized images of the kidneys which suggest atrophy. There are bilateral areas low-attenuation the largest the left measures 2.4 cm and the largest on the right which is exophytic at the posterior pole region measures 1.7 cm. These are of low-attenuation but not fully characterized on this noncontrast study. Visualized osseous structures show multilevel degenerative changes of the thoracic spine     1. THE PROXIMAL ESOPHAGUS IS MILDLY ECTATIC. THERE IS AN AREA OF PROXIMAL. STILL DILATATION WITH DEBRIS NOTED. FINDINGS MAY REPRESENT AN AREA OF IMPACTION. THERE MAY BE A ASSOCIATED ESOPHAGEAL DIVERTICULUM. NO DARLINE PERFORATION. FURTHER EVALUATION RECOMMENDED. CONSIDER GI CONSULT 2. THE VISUALIZED PORTION OF KIDNEYS SHOW THAT THERE ARE ATROPHIC. THERE IS AREAS LOW-ATTENUATION BILATERALLY LIKELY RENAL CYST BUT NOT FULLY CHARACTERIZED IN THIS NONCONTRAST STUDY. CORRELATE CLINICALLY AND FOLLOW-UP. OTHER FINDINGS AS DETAILED ABOVE      Mra Head  1/2/2020  EXAM: MR angiography of the head without contrast. History: Meningioma Technique: Multiplanar multisequence MRI of the brain was performed. 3-D time-of-flight axial images were performed through the [place]. 3-D volume rendered images of the [place] performed. Comparison: MRI of the brain from January 1, 2000 Findings: The bilateral distal cervical internal carotid arteries through the skull base are patent.  The bilateral middle cerebral arteries through the trifurcation and excluded. There is extensive edema extending throughout the left frontal lobe, left parietal lobe and left basal ganglia/temporal lobe, without evidence of restricted diffusion, greater than expected. Findings could reflect extensive edema from the mass/malignancy. Other etiologies are not excluded. . Neurosurgical/neurological evaluation recommended. 2. Left to right subfalcine herniation of 0.56 cm. Neurosurgical consultation and evaluation is recommended. 3. Dominant left vertebral artery        Previous extensive, complex labs, notes and diagnostics reviewed and analyzed. ALLERGIES:    Allergies as of 01/09/2020 - Review Complete 01/09/2020   Allergen Reaction Noted    Benadryl [diphenhydramine]  05/08/2017    Chlorhexidine  05/08/2017      (please also verify by checking MAR)    Patient was not interested in attending the support group or art therapy yesterday. Complex Physical Medicine & Rehab Issues Assess & Plan:   1. Severe abnormality of gait and mobility and impaired self-care and ADL's secondary to progressive balance deficits status post left temporal intracranial tumor. Functional and medical status reassessed regarding patients ability to participate in therapies and patient found to be able to participate in acute intensive comprehensive inpatient rehabilitation program including PT/OT to improve balance, ambulation, ADLs, and to improve the P/AROM. Therapeutic modifications regarding activities in therapies, place, amount of time per day and intensity of therapy made daily. In bed therapies or bedside therapies prn.   2. Bowel and Bladder dysfunction:  frequent toileting, ambulate to bathroom with assistance, check post void residuals. Check for C.difficile x1 if >2 loose stools in 24 hours, continue bowel & bladder program.  Monitor bowel and bladder function. Lactinex 2 PO every AC.   MOM prn, Brown Bomb prn, Glycerin suppository prn, enema prn.  3. Severe osteoarthritis and neck pain as well as generalized OA pain: reassess pain every shift and prior to and after each therapy session, give prn Tylenol and add Norco as needed, modalities prn in therapy, Lidoderm, K-pad prn.   4. Skin healing left craniotomy incision and breakdown risk:  continue pressure relief program.  Daily skin exams and reports from nursing. 5. Severe fatigue due to nutritional and hydration deficiency: Add vitamin B12 vitamin D and CoQ10 continue to monitor I&Os, calorie counts prn, dietary consult prn.  6. Acute episodic insomnia with situational adjustment disorder:  prn Ambien, monitor for day time sedation. 7. Falls risk elevated:  patient to use call light to get nursing assistance to get up, bed and chair alarm. 8. Elevated DVT risk: progressive activities in PT, continue prophylaxis SREEDHAR hose, elevation and Lovenox on hold due to increased GERD. 9. Complex discharge planning: Focus on balance and medical stability as well as healing plans discharge January 17, 2020 home with outpatient services. Weekly team meeting every Monday to assess progress towards goals, discuss and address social, psychological and medical comorbidities and to address difficulties they may be having progressing in therapy. Patient and family education is in progress. The patient is to follow-up with their family physician after discharge. Complex Active General Medical Issues that complicate care Assess & Plan:    1. End stage kidney disease,   Hx of kidney transplant-limit toxic medications recheck BMP and CBC titrate Prograf and CellCept  2. Essential hypertension, occasional angina mixed dyslipidemia-vital signs every shift dose and titrate cardiac medications to include Norvasc Lopressor holding blood thinners postoperatively consult hospitalist for backup medical check troponins EKG was unremarkable 1/11/2020  3. Gout Elevated blood uric acid level-low purine diet when possible  4.    Cerebral malignant neoplasm -status post resection follow-up with Dr. Yaritza James as an outpatient  5. Generalized seizure -add Keppra titrate Decadron for postop swelling  6. Postherpetic neuralgia-titrate pain medications using lowest effective dose  7. Epigastric abdominal pain-abdominal binder Lidoderm  8. Immunosuppressive management encounter following kidney transplant-vitamin B12 vitamin D co-Q10 strict handwashing  9. Pain in thoracic spine and generalized neuropathic pain-titrate Cymbalta, titrate Ultram and Tylenol and increased mattress  10. Vitamin D deficiency-bolus dose vitamin D  11. GERD-titrate Protonix monitor stools for blood hold Lovenox.   12. Oral pharyngeal dysphasia-consult speech and language pathology add dental soft diet thin liquids           Yamilka Blair D.O., PM&R     Attending    [address]

## 2020-01-13 PROCEDURE — 99232 SBSQ HOSP IP/OBS MODERATE 35: CPT | Performed by: PSYCHIATRY & NEUROLOGY

## 2020-01-13 PROCEDURE — 97116 GAIT TRAINING THERAPY: CPT

## 2020-01-13 PROCEDURE — 97112 NEUROMUSCULAR REEDUCATION: CPT

## 2020-01-13 PROCEDURE — 6370000000 HC RX 637 (ALT 250 FOR IP): Performed by: PHYSICAL MEDICINE & REHABILITATION

## 2020-01-13 PROCEDURE — 99233 SBSQ HOSP IP/OBS HIGH 50: CPT | Performed by: PHYSICAL MEDICINE & REHABILITATION

## 2020-01-13 PROCEDURE — 97535 SELF CARE MNGMENT TRAINING: CPT

## 2020-01-13 PROCEDURE — 97530 THERAPEUTIC ACTIVITIES: CPT

## 2020-01-13 PROCEDURE — 6370000000 HC RX 637 (ALT 250 FOR IP): Performed by: NURSE PRACTITIONER

## 2020-01-13 PROCEDURE — 1180000000 HC REHAB R&B

## 2020-01-13 PROCEDURE — 93010 ELECTROCARDIOGRAM REPORT: CPT | Performed by: INTERNAL MEDICINE

## 2020-01-13 RX ADMIN — MYCOPHENOLATE MOFETIL 500 MG: 250 CAPSULE ORAL at 10:12

## 2020-01-13 RX ADMIN — ANTACID TABLETS 500 MG: 500 TABLET, CHEWABLE ORAL at 21:15

## 2020-01-13 RX ADMIN — ANTACID TABLETS 500 MG: 500 TABLET, CHEWABLE ORAL at 10:12

## 2020-01-13 RX ADMIN — METOPROLOL TARTRATE 25 MG: 25 TABLET, FILM COATED ORAL at 10:12

## 2020-01-13 RX ADMIN — TACROLIMUS 2 MG: 1 CAPSULE ORAL at 21:17

## 2020-01-13 RX ADMIN — LEVETIRACETAM 750 MG: 250 TABLET ORAL at 21:14

## 2020-01-13 RX ADMIN — SULFAMETHOXAZOLE AND TRIMETHOPRIM 1 TABLET: 400; 80 TABLET ORAL at 12:37

## 2020-01-13 RX ADMIN — PREDNISONE 5 MG: 5 TABLET ORAL at 10:12

## 2020-01-13 RX ADMIN — MYCOPHENOLATE MOFETIL 500 MG: 250 CAPSULE ORAL at 21:18

## 2020-01-13 RX ADMIN — LEVETIRACETAM 750 MG: 250 TABLET ORAL at 10:12

## 2020-01-13 RX ADMIN — TACROLIMUS 3 MG: 1 CAPSULE ORAL at 10:12

## 2020-01-13 RX ADMIN — CINACALCET HYDROCHLORIDE 30 MG: 30 TABLET, FILM COATED ORAL at 12:37

## 2020-01-13 RX ADMIN — SUCRALFATE 1 G: 1 SUSPENSION ORAL at 10:12

## 2020-01-13 RX ADMIN — PANTOPRAZOLE SODIUM 20 MG: 20 TABLET, DELAYED RELEASE ORAL at 16:15

## 2020-01-13 RX ADMIN — TRAMADOL HYDROCHLORIDE 50 MG: 50 TABLET, FILM COATED ORAL at 21:14

## 2020-01-13 RX ADMIN — DULOXETINE HYDROCHLORIDE 30 MG: 30 CAPSULE, DELAYED RELEASE ORAL at 10:12

## 2020-01-13 RX ADMIN — METOPROLOL TARTRATE 25 MG: 25 TABLET, FILM COATED ORAL at 21:14

## 2020-01-13 RX ADMIN — SUCRALFATE 1 G: 1 SUSPENSION ORAL at 16:15

## 2020-01-13 RX ADMIN — AMLODIPINE BESYLATE 5 MG: 5 TABLET ORAL at 10:12

## 2020-01-13 RX ADMIN — SUCRALFATE 1 G: 1 SUSPENSION ORAL at 07:12

## 2020-01-13 RX ADMIN — PANTOPRAZOLE SODIUM 20 MG: 20 TABLET, DELAYED RELEASE ORAL at 07:12

## 2020-01-13 ASSESSMENT — ENCOUNTER SYMPTOMS
NAUSEA: 0
SHORTNESS OF BREATH: 0
TROUBLE SWALLOWING: 0
WHEEZING: 0
CHEST TIGHTNESS: 0
COLOR CHANGE: 0
COUGH: 0
VOMITING: 0

## 2020-01-13 ASSESSMENT — PAIN SCALES - GENERAL
PAINLEVEL_OUTOF10: 0
PAINLEVEL_OUTOF10: 0
PAINLEVEL_OUTOF10: 4

## 2020-01-13 NOTE — PROGRESS NOTES
Occupational Therapy  Fac[facility]/Department: [facility]  Daily Treatment Note  NAME: Jennifer Price  : 1945  MRN: [id number]    Date of Service: 2020    Discharge Recommendations:  Continue to assess pending progress       Assessment      Safety Devices  Safety Devices in place: Yes  Type of devices: All fall risk precautions in place         Patient Diagnosis(es): The primary encounter diagnosis was Cerebral malignant neoplasm ([facility] [address] 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 [facility] admit [facility]., Brain neoplasm [facility]), and Anxiety were also pertinent to this visit. has a past medical history of Anxiety, Cardiomegaly, Chronic kidney disease, Colon polyps, Depression, Gallbladder polyp, GERD (gastroesophageal reflux disease), Gout, Gout, Hypertension, Hyperuricemia, Hypothyroid, and Vitamin D deficiency. has a past surgical history that includes Dialysis fistula creation (Left); Kidney transplant (2018); craniotomy (N/A, 1/3/2020); and Upper gastrointestinal endoscopy (N/A, 2020). 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 Pt refused shower, agreeable to sponge bath.     ADL  Grooming: Setup(washing face only)  UE Bathing: Stand by assistance(pt completed standing)  Additional Comments: Pt completed sponge bath ADL tasks standing at sink with wc behind. Plan   Plan  Times per week: 5-7x/wk 1-1 1/2 weeks  Current Treatment Recommendations: Strengthening, Functional Mobility Training, Home Management Training, Equipment Evaluation, Education, & procurement, Endurance Training, Balance Training, Neuromuscular Re-education, Self-Care / ADL, Safety Education & Training       Goals  Patient Goals   Patient goals : To return to home when ready       Therapy Time   Individual Concurrent Group Co-treatment   Time In 0830         Time Out 0845         Minutes 15             OT session started until covering OT arrived.    ADL training: 15 minutes    Jenny Aden OT    Electronically signed by Jenny Aden OT on 1/13/2020 at 9:49 AM

## 2020-01-13 NOTE — PROGRESS NOTES
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       , Chart reviewed      , Patient interviewed     and Recreation Therapy services discussed with patient. Patient reports pain is a  Not an issue at the moment     Ability to provide information regarding leisure and recreation interests:   Language barrier    Patient:    Is aware and engages in leisure and recreational pursuits which include the following recreational domains:   Phi, Has interest in: Pet Therapy and Music Therapy, Educated on relaxation techniques and Provided with leisure education    Interests: Pt stated she plays games on her ipad (she had at bedside). She also states she speaks to her dtr on her ipad. She reports they have family dogs at her dtr's home. Pt states she watches [employer] Tv and listens to [employer]. Past interests: crocheting. Observed/noted emotions:    Cooperative and Pleasant ; smiling    Patient provided with the following accessible and independently used recreation/leisure resources when in hospital room:   5 days week large print orientation/puzzle activity handout to be left for family if they choose to translate. Pt to view date for orientation as well.      Recommendations:   Recreation Therapy services offered to all [facility] inpatients:  Recommended    Electronically signed by: Verna Hopkins  Date: 1/13/2020  Electronically signed by Verna Hopkins on 1/13/2020 at 10:19 AM

## 2020-01-13 NOTE — PROGRESS NOTES
reflexes or     sensation  Lungs:  CTA-B. Respiration effort is normal at rest.     Heart:   S1 = S2, RRR. No loud murmurs. Abdomen:  Soft, non-tender, no enlargement of liver or spleen. Extremities:  No significant lower extremity edema or tenderness. Skin:   Intact to general survey, no visualized or palpated problems. Healing craniotomy incision    Rehabilitation:  Physical therapy: FIMS:  Bed Mobility: Scooting: Modified independent    Transfers: Sit to Stand: Supervision  Stand to sit: Supervision  Bed to Chair: Supervision, Ambulation 1  Surface: level tile, carpet  Device: No Device  Assistance: Stand by assistance, Contact guard assistance  Quality of Gait: narrow POOJA with occasional scissoring. Deviates out of path. Occ reaching for side rail. Gait Deviations: Slow Marisa, Decreased step length, Decreased arm swing, Deviated path  Distance: 150ft  Comments: Occ VC's for environmental awareness in busy environment, Stairs  # Steps : 12  Stairs Height: 6\"  Rails: Right ascending  Assistance: Supervision  Comment: Steady pace. Reciprocal pattern. FIMS:  ,  , Assessment: Pt demonstrates good motivation and participation this AM despite c/o chest pain. Pt continues to have occ LOB with ambulation secondary to occ scissoring. Pt challenged this session with L/R head rotations during ambulation, min A required to correct occ LOB. Occupational therapy: FIMS:   ,  ,      Speech therapy: FIMS:        Lab/X-ray studies reviewed, analyzed and discussed with patient and staff:   No results found for this or any previous visit (from the past 24 hour(s)). Ct Head Wo  1/4/2020   INTERVAL CRANIOTOMY WITH PROBABLE RESECTION REMOVAL OF LEFT anterior MIDDLE CRANIAL FOSSA TUMOR. PERSISTENT DIFFUSE VASOGENIC EDEMA OF THE LEFT FRONTAL PARIETAL TEMPORAL LOBES PERSISTENT EFFACEMENT OF THE LEFT LATERAL VENTRICLE WITH SHIFT OF MIDLINE TO THE RIGHT.  OTHER FINDINGS DETAILED ABOVE      Ct Head Wo THERE MAY BE A ASSOCIATED ESOPHAGEAL DIVERTICULUM. NO LINDA PERFORATION. FURTHER EVALUATION RECOMMENDED. CONSIDER GI CONSULT 2. THE VISUALIZED PORTION OF KIDNEYS SHOW THAT THERE ARE ATROPHIC. THERE IS AREAS LOW-ATTENUATION BILATERALLY LIKELY RENAL CYST BUT NOT FULLY CHARACTERIZED IN THIS NONCONTRAST STUDY. CORRELATE CLINICALLY AND FOLLOW-UP. OTHER FINDINGS AS DETAILED ABOVE      Mra Head  1/2/2020  No aneurysm or high-grade stenosis, or vascular malformation of the visualized cerebral vasculature. No significant interval change in left frontal, parietal, and temporal lobe edema and 6 mm of rightward midline shift. Xr Chest  1/1/2020   Impression:  1. Possible nodular abnormality left lower lung, not well characterized, measuring 1.4 cm, correlation with CT scan chest recommended. 2. Enlarged cardiomediastinal silhouette, the possibility of underlying pericardial effusion or other cardiac abnormalities are not excluded on the basis of this exam, clinical correlation recommended. . 3. Nonspecific bibasilar airspace disease, findings can be seen in infiltrate/pneumonia and/or atelectasis. Cheryl Juan 4. Vascular calcifications thoracic aorta     Mri Brain W Wo 1/1/2020  1. Enhancing mass/malignancy in the left middle cranial fossa/left anterior temporal lobe, findings suspicious for meningioma although glioblastoma is not excluded. There is extensive edema extending throughout the left frontal lobe, left parietal lobe and left basal ganglia/temporal lobe, without evidence of restricted diffusion, greater than expected. Findings could reflect extensive edema from the mass/malignancy. Other etiologies are not excluded. . Neurosurgical/neurological evaluation recommended. 2. Left to right subfalcine herniation of 0.56 cm. Neurosurgical consultation and evaluation is recommended. 3. Dominant left vertebral artery        Previous extensive, complex labs, notes and diagnostics reviewed and analyzed.      ALLERGIES:    Allergies as of medications to include Norvasc Lopressor holding blood thinners postoperatively consult hospitalist for backup medical check troponins EKG was unremarkable 1/11/2020  3. Gout Elevated blood uric acid level-low purine diet when possible  4. Cerebral malignant neoplasm -status post resection follow-up with Dr. Ureña Daily as an outpatient  5. Generalized seizure -add Keppra titrate Decadron for postop swelling  6. Postherpetic neuralgia-titrate pain medications using lowest effective dose  7. Epigastric abdominal pain-abdominal binder Lidoderm  8. Immunosuppressive management encounter following kidney transplant-vitamin B12 vitamin D co-Q10 strict handwashing  9. Pain in thoracic spine and generalized neuropathic pain-titrate Cymbalta, titrate Ultram and Tylenol and increased mattress  10. Vitamin D deficiency-bolus dose vitamin D  11. GERD-titrate Protonix monitor stools for blood hold Lovenox. Add Carafate add Tums.   12. Oral pharyngeal dysphasia-consult speech and language pathology add dental soft diet thin liquids           Radha Coleman D.O., PM&R     Attending    [address]

## 2020-01-13 NOTE — PROGRESS NOTES
Physical Therapy Rehab Treatment Note  Facility/Department: [facility]  [place] [facility] [place]       NAME: Dustin Barrios  : 1945 (69 y.o.)  MRN: [id number]  CODE STATUS: Full Code    Date of Service: 2020     Restrictions:  Restrictions/Precautions: Fall Risk, Seizure    SUBJECTIVE: Subjective: \"tired today\"        Post Treatment Pain Screenin/10     OBJECTIVE:              Bed mobility  Bridging: Modified independent   Rolling to Left: Modified independent  Rolling to Right: Modified independent  Supine to Sit: Modified independent    Transfers  Sit to Stand: Modified independent  Stand to sit: Modified independent  Bed to Chair: Modified independent  Car Transfer: Modified independent    Ambulation  Ambulation?: Yes  Ambulation 1  Surface: level tile;carpet;ramp  Device: No Device  Assistance: Stand by assistance  Quality of Gait: Narrow POOJA with occasional scissoring. Deviates out of path. Pt corrects. Distance: 150ft           Exercises  Hip Abduction: hooklying YTB x20; hooklying add with ball x20  Neurodevelopmental Techniques: Ambulating around dept locating and retreiving cones to challange balance and environmental scanning. Standing step taps on 4 in block and toe taps on cones without UE support. Gait with horizontal and verticle head turns. Other exercises 1: S/L hip abd x10, s/l clams x10, S/L SKTC x10 B, bridges x10     ASSESSMENT/COMMENTS:  Assessment: Gait drills challanged with head turns. PLAN OF CARE/Safety:   Plan Comment: Cont.  POC    Therapy Time:   Individual   Time In 1330   Time Out 1400   Minutes 30     Minutes:        Transfer/Bed mobility trainin      Gait training:10      Neuro re education:10     Therapeutic ex:10    Brooklynn Molina PTA, 20 at 4:41 PM

## 2020-01-13 NOTE — PROGRESS NOTES
syncope, facial asymmetry, speech difficulty, weakness, light-headedness, numbness and headaches. Psychiatric/Behavioral: Negative for agitation, confusion and hallucinations. The patient is not nervous/anxious. Physical Exam  Vitals signs and nursing note reviewed. Constitutional:       General: She is not in acute distress. Appearance: She is not diaphoretic. HENT:      Head: Normocephalic and atraumatic. Eyes:      Extraocular Movements: Extraocular movements intact. Pupils: Pupils are equal, round, and reactive to light. Cardiovascular:      Rate and Rhythm: Normal rate and regular rhythm. Pulmonary:      Effort: Pulmonary effort is normal. No respiratory distress. Breath sounds: Normal breath sounds. Abdominal:      General: Bowel sounds are normal. There is no distension. Palpations: Abdomen is soft. Tenderness: There is no tenderness. Skin:     General: Skin is warm and dry. Neurological:      General: No focal deficit present. Mental Status: She is alert and oriented to person, place, and time. Cranial Nerves: No cranial nerve deficit. Sensory: No sensory deficit. Motor: No weakness.       Coordination: Coordination normal.      Deep Tendon Reflexes: Reflexes normal.           Medications:  Reviewed    Infusion Medications:   Scheduled Medications:    pantoprazole  20 mg Oral BID AC    calcium carbonate  500 mg Oral BID    sucralfate  1 g Oral TID AC    amLODIPine  5 mg Oral Daily    cinacalcet  30 mg Oral Daily    DULoxetine  30 mg Oral Daily    levETIRAcetam  750 mg Oral BID    metoprolol tartrate  25 mg Oral BID    mycophenolate  500 mg Oral BID    predniSONE  5 mg Oral Daily    sulfamethoxazole-trimethoprim  1 tablet Oral Daily    tacrolimus  2 mg Oral QPM    tacrolimus  3 mg Oral QAM     PRN Meds: magnesium hydroxide, traMADol, acetaminophen, fleet, lactulose    Labs:   No results for input(s): WBC, HGB, HCT, PLT in the last 72 hours. No results for input(s): NA, K, CL, CO2, BUN, CREATININE, CALCIUM, PHOS in the last 72 hours. Invalid input(s): MAGNES  No results for input(s): AST, ALT, BILIDIR, BILITOT, ALKPHOS in the last 72 hours. No results for input(s): INR in the last 72 hours. Recent Labs     20  0905   TROPONINI <0.010       Urinalysis:   Lab Results   Component Value Date    NITRU Negative 2020    BLOODU Negative 2020    SPECGRAV 1.014 2020    GLUCOSEU Negative 2020       Radiology:   Most recent    EEG No procedure found. MRI of Brain   Results for orders placed during the hospital encounter of 20   MRI Brain W WO Contrast    Narrative Patient MRN: [id number]    : 1945    Age:  69 years    Gender: Female    Order Date: 2020 9:45 AM.     Exam: MRI BRAIN W WO CONTRAST    Number of Views: 2261     Indication:  Stroke    Comparison: Brain MRI 2014. Head CT 2020. Contrast dosage: MultiHance, 7 mL, IV    Findings:   No evident restricted diffusion or acute/subacute cerebrovascular infarction/accident definitively identified. Pituitary gland and sellar/suprasellar regions are unremarkable. No Chiari malformation. . Hippocampal formations are symmetric in signal intensity and morphology bilaterally given the mass effect on the left compared with the normal-appearing right. No   intrinsic noncontrast T1 shortening. T1 hypointense edema throughout the visualized left frontal, parietal, temporal lobe and basal ganglia. Abnormal T2 FLAIR hyperintensity throughout the left frontal, parietal and temporal lobe cortical and subcortical white matter and also extending in the left basal ganglia. Compression of the left lateral ventricular system. Approximately 0.56 cm left   right subfalcine herniation. There is effacement of the left ambient cistern and the basilar cistern region. No tonsillar herniation.     There is a dominant left vertebral artery, otherwise, normal expected appearance of visualized T2 flow voids. Optic globes and orbital contents are within normal limits. Visualized paranasal sinuses and mastoid air cells are free from fluid or mass. . No   intraparenchymal hemorrhage. Pulsation artifact limits evaluation of the postcontrast exam and the cerebellum. Focal large enhancing mass in the left middle cranial fossa cyst left lung/left anterior temporal lobe, measuring approximately 2.3 cm anterior posterior by 2.1 cm   transverse by 1.5 cm craniocaudal.    No dural venous sinus thrombosis or occlusion is identified. No other enhancing mass is seen. Impression 1. Enhancing mass/malignancy in the left middle cranial fossa/left anterior temporal lobe, findings suspicious for meningioma although glioblastoma is not excluded. There is extensive edema extending throughout the left frontal lobe, left parietal lobe   and left basal ganglia/temporal lobe, without evidence of restricted diffusion, greater than expected. Findings could reflect extensive edema from the mass/malignancy. Other etiologies are not excluded. . Neurosurgical/neurological evaluation recommended. 2. Left to right subfalcine herniation of 0.56 cm. Neurosurgical consultation and evaluation is recommended. 3. Dominant left vertebral artery         No results found for this or any previous visit. MRA of the Head and Neck: No results found for this or any previous visit. No results found for this or any previous visit. Results for orders placed during the hospital encounter of 01/01/20   MRA HEAD WO CONTRAST    Narrative EXAM: MR angiography of the head without contrast.    History: Meningioma    Technique: Multiplanar multisequence MRI of the brain was performed. 3-D time-of-flight axial images were performed through the [facility]. 3-D volume rendered images of the [facility] performed. Comparison: MRI of the brain from January 1, 2000    Findings:     The made of diffuse decreased attenuation throughout the subcortical white matter of the left frontal, parietal and temporal lobes most likely consistent with vasogenic edema. There are changes within the anterior aspect of the left middle cranial fossa which May reflect postoperative changes at site of tumor resection, removal..    The visualized portion of the paranasal sinuses, and mastoids are unremarkable. There are soft tissue changes in subcutaneous emphysema seen overlying left frontal parietal region and in the infratemporal fossa. Most likely postsurgical.      Impression INTERVAL CRANIOTOMY WITH PROBABLE RESECTION REMOVAL OF LEFT anterior MIDDLE CRANIAL FOSSA TUMOR. PERSISTENT DIFFUSE VASOGENIC EDEMA OF THE LEFT FRONTAL PARIETAL TEMPORAL LOBES  PERSISTENT EFFACEMENT OF THE LEFT LATERAL VENTRICLE WITH SHIFT OF MIDLINE TO THE RIGHT. OTHER FINDINGS DETAILED ABOVE    All CT scans at this facility use dose modulation, iterative reconstruction, and/or weight based dosing when appropriate to reduce radiation dose to as low as reasonably achievable. No results found for this or any previous visit. No results found for this or any previous visit. Carotid duplex: No results found for this or any previous visit. No results found for this or any previous visit. No results found for this or any previous visit. Echo No results found for this or any previous visit. Assessment/Plan:    Left cerebral tumor with cerebral edema  S/p left craniotomy 1/3/20  The tumor may suggest a meningioma, pathology pending  Expressive aphasia, improved  Cont Keppra  Decadron wean completed  Hx kidney transplant on immunosuppressive drugs  PT/OT/ST  Improving    Deandre Green MD, Petr Clarke, American Board of Psychiatry & Neurology  Board Certified in Vascular Neurology  Board Certified in Neuromuscular Medicine  Certified in Neurorehabilitation         Collaborating physicians: Dr Darius Green    Electronically signed by Gilford Buffalo, APRN - CNP on 1/13/2020 at 6:54 PM

## 2020-01-13 NOTE — PROGRESS NOTES
Pt is resting in bed. VSS. Focus assessment complete. Pt has no pain or SOB. Will continue POC with LPN.

## 2020-01-13 NOTE — CONSULTS
Neurology Consult Note  Name: Layla Bazzi  Age: 69 y.o. Gender: female  CodeStatus: Full Code  Allergies: Benadryl [Diphenhydramine]  Chlorhexidine    Chief Complaint:No chief complaint on file. Primary Care Provider: Rehana Amaral DO  InpatientTreatment Team: Treatment Team: Attending Provider: Nuzhat Grayson DO; Consulting Physician: Aidee Villasenor MD; Consulting Physician: Nilesh Monique MD; Consulting Physician: Louis Powell DO; LPN: Karissa Tom LPN; Hospitalist: Suha Carrington MD; Registered Nurse: Gabriella Franco RN; Patient Care Tech: Chioma Dent; Patient Care Tech: Dot Basilio  Admission Date: 1/9/2020      HPI   Pt seen and examined on rehab unit for neurology consult. Pt was admitted to [facility] from 1/1/9 to 1/9/20 for 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 Pt had left craniotomy on 1/3/20. Hospital records reviewed. Started on decadron and Keppra. No seizure activity. Aphasia improving. Currently A&O x3, NAD, cooperative. No focal deficits. Afebrile. Denies Headache, double vision, blurry vision, difficulty with speech, difficulty with swallowing, weakness, numbness, pain, nausea, vomiting, choking, neck pain, dizziness. No acute neuro complaints/concerns.    Active Ambulatory Problems     Diagnosis Date Noted    End stage kidney disease ([address] 10/27/2011    Combined fat and carbohydrate induced hyperlipemia 02/23/2015    Essential hypertension 10/27/2011    Acid reflux 05/08/2017    Gout 03/03/2015    Angina, class IV ([address] 05/08/2017    Mixed dyslipidemia 05/08/2017    Elevated blood uric acid level 03/21/2017    Brain neoplasm ([address] 01/01/2020    Cerebral malignant neoplasm ([address] 01/02/2020    Generalized seizure ([address]     Aphasia     Anxiety 07/20/2018    Chronic pain disorder 06/20/2017    Postherpetic neuralgia 09/19/2018    Depression, major, recurrent, moderate (HCC) 10/08/2018    Epigastric abdominal pain 04/05/2018    History of colonic polyps 09/26/2017    Immunosuppressive management encounter following kidney transplant 04/02/2018    Pain in thoracic spine 07/15/2018    Esophageal disorder 07/20/2018    Abnormality of gait and mobility due to 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 [facility] admit 01/09/20. 01/09/2020    Cardiomegaly 01/09/2020    Hypothyroid 01/09/2020    Vitamin D deficiency 01/09/2020    BMI 27.0-27.9,adult 01/09/2020    Hx of kidney transplant 01/09/2020     Resolved Ambulatory Problems     Diagnosis Date Noted    No Resolved Ambulatory Problems     Past Medical History:   Diagnosis Date    Chronic kidney disease     Colon polyps     Depression     Gallbladder polyp     GERD (gastroesophageal reflux disease)     Hypertension     Hyperuricemia        Vitals:    01/13/20 0748   BP: 119/71   Pulse: 82   Resp: 18   Temp: 98 °F (36.7 °C)   SpO2: 96%   Breast Cancer-related family history is not on file. Social History     Socioeconomic History    Marital status:      Spouse name: Not on file    Number of children: Not on file    Years of education: Not on file    Highest education level: Not on file   Occupational History    Occupation: [de-identified]    Occupation: Caregiver to the elderly   Social Needs    Financial resource strain: Not very hard    Food insecurity:     Worry: Never true     Inability: Never true    Transportation needs:     Medical: No     Non-medical: No   Tobacco Use    Smoking status: Never Smoker    Smokeless tobacco: Never Used   Substance and Sexual Activity    Alcohol use: Never     Frequency: Never    Drug use: Never    Sexual activity: Not Currently     Partners: Male   Lifestyle    Physical activity:     Days per week: 0 days     Minutes per session: 0 min    Stress:  Only a little   Relationships    Social connections:     Talks on phone: More than three times a week     Gets together: More than three times a week     Attends Orthodoxy service: More than 4 times per year     Active member of club or organization: No     Attends meetings of clubs or organizations: Never     Relationship status:     Intimate partner violence:     Fear of current or ex partner: No     Emotionally abused: No     Physically abused: No     Forced sexual activity: No   Other Topics Concern    Not on file   Social History Narrative         Lives With: 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 She is a [address], [place]. 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 Type of Home: House Multi-level, Bed/Bath upstairs    Home Access: Stairs to enter with rails    Entrance Stairs - Number of Steps: 2    Bathroom Shower/Tub: Tub/Shower unit    Bathroom Equipment: Shower chair    Home Equipment: Rolling walker    ADL Assistance: Independent    Homemaking Assistance: Independent    Homemaking Responsibilities: (Pt reports that she was independent)    Ambulation Assistance: Independent    Transfer Assistance: Independent    Active : No    Additional Comments: has 2 dtrs that stop in daily    Social/Functional History             Review of Systems   Constitutional: Negative for appetite change, chills, fatigue and fever. HENT: Negative for hearing loss and trouble swallowing. Eyes: Negative for visual disturbance. Respiratory: Negative for cough, chest tightness, shortness of breath and wheezing. Cardiovascular: Negative for chest pain, palpitations and leg swelling. Gastrointestinal: Negative for nausea and vomiting. Musculoskeletal: Negative for gait problem. Skin: Negative for color change and rash. Neurological: Negative for dizziness, tremors, seizures, syncope, facial asymmetry, speech difficulty, weakness, light-headedness, numbness and headaches.    Psychiatric/Behavioral: Negative for agitation, confusion and hallucinations. The patient is not nervous/anxious. Physical Exam  Vitals signs and nursing note reviewed. Constitutional:       General: She is not in acute distress. Appearance: She is not diaphoretic. HENT:      Head: Normocephalic and atraumatic. Eyes:      Extraocular Movements: Extraocular movements intact. Pupils: Pupils are equal, round, and reactive to light. Cardiovascular:      Rate and Rhythm: Normal rate and regular rhythm. Pulmonary:      Effort: Pulmonary effort is normal. No respiratory distress. Breath sounds: Normal breath sounds. Abdominal:      General: Bowel sounds are normal. There is no distension. Palpations: Abdomen is soft. Tenderness: There is no tenderness. Skin:     General: Skin is warm and dry. Neurological:      General: No focal deficit present. Mental Status: She is alert and oriented to person, place, and time. Cranial Nerves: No cranial nerve deficit. Motor: No weakness. Medications:  Reviewed    Infusion Medications:   Scheduled Medications:    pantoprazole  20 mg Oral BID AC    calcium carbonate  500 mg Oral BID    sucralfate  1 g Oral TID AC    amLODIPine  5 mg Oral Daily    cinacalcet  30 mg Oral Daily    DULoxetine  30 mg Oral Daily    levETIRAcetam  750 mg Oral BID    metoprolol tartrate  25 mg Oral BID    mycophenolate  500 mg Oral BID    predniSONE  5 mg Oral Daily    sulfamethoxazole-trimethoprim  1 tablet Oral Daily    tacrolimus  2 mg Oral QPM    tacrolimus  3 mg Oral QAM     PRN Meds: magnesium hydroxide, traMADol, acetaminophen, fleet, lactulose    Labs:   No results for input(s): WBC, HGB, HCT, PLT in the last 72 hours. No results for input(s): NA, K, CL, CO2, BUN, CREATININE, CALCIUM, PHOS in the last 72 hours. Invalid input(s): MAGNES  No results for input(s): AST, ALT, BILIDIR, BILITOT, ALKPHOS in the last 72 hours.   No results for input(s): INR in the last 72 cerebral arteries are patent. Left vertebral artery is dominant. Posterior communicating arteries are patent. The bilateral anterior cerebral arteries and anterior communicating artery are patent. No aneurysm or high-grade stenosis of the visualized cerebral vasculature. No significant interval change in edema of the left frontal, parietal, and temporal lobes. No significant interval change of 6 mm of rightward midline shift. Again identified is a 2.3 cm mass within the anterior left middle cranial fossa as detailed on   recent MRI of the brain. Impression No aneurysm or high-grade stenosis, or vascular malformation of the visualized cerebral vasculature. No significant interval change in left frontal, parietal, and temporal lobe edema and 6 mm of rightward midline shift. CT of the Head:   Results for orders placed during the hospital encounter of 01/01/20   CT HEAD WO CONTRAST    Narrative CT HEAD WO CONTRAST    CLINICAL HISTORY:  Postop     COMPARISON: January 1, 2020. Clinical note: Please see MRI report of brain January 1, 2024 additional details    TECHNIQUE: Multiple unenhanced serial axial images of the brain from the vertex of the skull to the base of the skull were performed. FINDINGS: There is been interval left-sided craniotomy since prior examination. There is pneumocephaly seen anterior to the left frontal lobe and anterior to the left temporal lobe. Most likely postoperative. There is still effacement of the frontal, body and posterior horn of the left lateral ventricle with shift of the midline to the right of approximately 8 to 9 mm. The cisterns remain grossly unchanged. Again note is made of diffuse decreased attenuation throughout the subcortical white matter of the left frontal, parietal and temporal lobes most likely consistent with vasogenic edema.   There are changes within the anterior aspect of the left middle cranial fossa which May reflect postoperative changes at site of tumor resection, removal..    The visualized portion of the paranasal sinuses, and mastoids are unremarkable. There are soft tissue changes in subcutaneous emphysema seen overlying left frontal parietal region and in the infratemporal fossa. Most likely postsurgical.      Impression INTERVAL CRANIOTOMY WITH PROBABLE RESECTION REMOVAL OF LEFT anterior MIDDLE CRANIAL FOSSA TUMOR. PERSISTENT DIFFUSE VASOGENIC EDEMA OF THE LEFT FRONTAL PARIETAL TEMPORAL LOBES  PERSISTENT EFFACEMENT OF THE LEFT LATERAL VENTRICLE WITH SHIFT OF MIDLINE TO THE RIGHT. OTHER FINDINGS DETAILED ABOVE    All CT scans at this facility use dose modulation, iterative reconstruction, and/or weight based dosing when appropriate to reduce radiation dose to as low as reasonably achievable. No results found for this or any previous visit. No results found for this or any previous visit. Carotid duplex: No results found for this or any previous visit. No results found for this or any previous visit. No results found for this or any previous visit. Echo No results found for this or any previous visit. Assessment/Plan:    Left cerebral tumor with cerebral edema  S/p left craniotomy 1/3/20  The tumor may suggest a meningioma, pathology pending  Expressive aphasia, improved  will continue on Decadron and Keppra for now.   Decadron wean  Hx kidney transplant on immunosuppressive drugs  PT/OT/ST  Improving      Collaborating physicians: Dr Shira Garcia    Electronically signed by AMY Benoit CNP on 1/13/2020 at 6:40 PM

## 2020-01-14 LAB
ANION GAP SERPL CALCULATED.3IONS-SCNC: 12 MEQ/L (ref 9–15)
BUN BLDV-MCNC: 23 MG/DL (ref 8–23)
CALCIUM SERPL-MCNC: 9.5 MG/DL (ref 8.5–9.9)
CHLORIDE BLD-SCNC: 100 MEQ/L (ref 95–107)
CO2: 22 MEQ/L (ref 20–31)
CREAT SERPL-MCNC: 0.9 MG/DL (ref 0.5–0.9)
GFR AFRICAN AMERICAN: >60
GFR NON-AFRICAN AMERICAN: >60
GLUCOSE BLD-MCNC: 90 MG/DL (ref 70–99)
POTASSIUM SERPL-SCNC: 4.3 MEQ/L (ref 3.4–4.9)
SODIUM BLD-SCNC: 134 MEQ/L (ref 135–144)

## 2020-01-14 PROCEDURE — 99232 SBSQ HOSP IP/OBS MODERATE 35: CPT | Performed by: PHYSICAL MEDICINE & REHABILITATION

## 2020-01-14 PROCEDURE — 97112 NEUROMUSCULAR REEDUCATION: CPT

## 2020-01-14 PROCEDURE — 97110 THERAPEUTIC EXERCISES: CPT

## 2020-01-14 PROCEDURE — 97535 SELF CARE MNGMENT TRAINING: CPT

## 2020-01-14 PROCEDURE — 6370000000 HC RX 637 (ALT 250 FOR IP): Performed by: NURSE PRACTITIONER

## 2020-01-14 PROCEDURE — 97116 GAIT TRAINING THERAPY: CPT

## 2020-01-14 PROCEDURE — 6370000000 HC RX 637 (ALT 250 FOR IP): Performed by: PHYSICAL MEDICINE & REHABILITATION

## 2020-01-14 PROCEDURE — 1180000000 HC REHAB R&B

## 2020-01-14 PROCEDURE — 97530 THERAPEUTIC ACTIVITIES: CPT

## 2020-01-14 PROCEDURE — 97129 THER IVNTJ 1ST 15 MIN: CPT

## 2020-01-14 PROCEDURE — 80048 BASIC METABOLIC PNL TOTAL CA: CPT

## 2020-01-14 PROCEDURE — 36415 COLL VENOUS BLD VENIPUNCTURE: CPT

## 2020-01-14 RX ADMIN — METOPROLOL TARTRATE 25 MG: 25 TABLET, FILM COATED ORAL at 20:12

## 2020-01-14 RX ADMIN — LEVETIRACETAM 750 MG: 250 TABLET ORAL at 09:44

## 2020-01-14 RX ADMIN — TACROLIMUS 3 MG: 1 CAPSULE ORAL at 09:47

## 2020-01-14 RX ADMIN — SULFAMETHOXAZOLE AND TRIMETHOPRIM 1 TABLET: 400; 80 TABLET ORAL at 12:10

## 2020-01-14 RX ADMIN — SUCRALFATE 1 G: 1 SUSPENSION ORAL at 17:58

## 2020-01-14 RX ADMIN — TACROLIMUS 2 MG: 1 CAPSULE ORAL at 20:12

## 2020-01-14 RX ADMIN — METOPROLOL TARTRATE 25 MG: 25 TABLET, FILM COATED ORAL at 09:43

## 2020-01-14 RX ADMIN — PREDNISONE 5 MG: 5 TABLET ORAL at 09:43

## 2020-01-14 RX ADMIN — PANTOPRAZOLE SODIUM 20 MG: 20 TABLET, DELAYED RELEASE ORAL at 06:04

## 2020-01-14 RX ADMIN — AMLODIPINE BESYLATE 5 MG: 5 TABLET ORAL at 09:43

## 2020-01-14 RX ADMIN — MYCOPHENOLATE MOFETIL 500 MG: 250 CAPSULE ORAL at 20:13

## 2020-01-14 RX ADMIN — ANTACID TABLETS 500 MG: 500 TABLET, CHEWABLE ORAL at 09:44

## 2020-01-14 RX ADMIN — ANTACID TABLETS 500 MG: 500 TABLET, CHEWABLE ORAL at 20:12

## 2020-01-14 RX ADMIN — DULOXETINE HYDROCHLORIDE 30 MG: 30 CAPSULE, DELAYED RELEASE ORAL at 09:43

## 2020-01-14 RX ADMIN — CINACALCET HYDROCHLORIDE 30 MG: 30 TABLET, FILM COATED ORAL at 12:10

## 2020-01-14 RX ADMIN — PANTOPRAZOLE SODIUM 20 MG: 20 TABLET, DELAYED RELEASE ORAL at 17:58

## 2020-01-14 RX ADMIN — SUCRALFATE 1 G: 1 SUSPENSION ORAL at 09:43

## 2020-01-14 RX ADMIN — LEVETIRACETAM 750 MG: 250 TABLET ORAL at 20:11

## 2020-01-14 RX ADMIN — SUCRALFATE 1 G: 1 SUSPENSION ORAL at 06:04

## 2020-01-14 RX ADMIN — MYCOPHENOLATE MOFETIL 500 MG: 250 CAPSULE ORAL at 09:44

## 2020-01-14 ASSESSMENT — ENCOUNTER SYMPTOMS
DIARRHEA: 0
COUGH: 0
NAUSEA: 0
SHORTNESS OF BREATH: 0
VOMITING: 0

## 2020-01-14 ASSESSMENT — PAIN SCALES - GENERAL
PAINLEVEL_OUTOF10: 0
PAINLEVEL_OUTOF10: 0

## 2020-01-14 NOTE — PLAN OF CARE
Problem: Coping:  Goal: Ability to cope will improve  Description  Ability to cope will improve  1/14/2020 1200 by Noreen Espana RN  Outcome: Ongoing  1/14/2020 0431 by Marni Lord. Mariana Arriaga RN  Outcome: Ongoing     Problem: Health Behavior:  Goal: Identification of resources available to assist in meeting health care needs will improve  Description  Identification of resources available to assist in meeting health care needs will improve  Outcome: Ongoing     Problem: Role Relationship:  Goal: Ability to communicate needs accurately will improve - ADL needs  Description  Ability to communicate needs accurately will improve  Outcome: Ongoing     Problem: Skin Integrity:  Goal: Demonstration of wound healing without infection will improve  Description  Demonstration of wound healing without infection will improve  1/14/2020 1200 by Noreen Espana RN  Outcome: Ongoing  1/14/2020 0431 by Marni Lord. Mariana Arriaga RN  Outcome: Ongoing     Problem: Falls - Risk of:  Goal: Will remain free from falls  Description  Will remain free from falls  1/14/2020 1200 by Noreen Espana RN  Outcome: Ongoing  1/14/2020 0431 by Marni Lord. Mariana Arriaga RN  Outcome: Ongoing  Goal: Absence of physical injury  Description  Absence of physical injury  1/14/2020 1200 by Noreen Espana RN  Outcome: Ongoing  1/14/2020 0431 by Marni Lord. Mariana Arriaga RN  Outcome: Ongoing     Problem: Pain:  Goal: Pain level will decrease  Description  Pain level will decrease  1/14/2020 1200 by Noreen Espana RN  Outcome: Ongoing  1/14/2020 0431 by Marni Lord. Mariana Arriaga RN  Outcome: Ongoing  Goal: Control of acute pain  Description  Control of acute pain  1/14/2020 1200 by Noreen Espana RN  Outcome: Ongoing  1/14/2020 0431 by Marni Lord. Mariana Arriaga RN  Outcome: Ongoing  Goal: Control of chronic pain  Description  Control of chronic pain  1/14/2020 1200 by Noreen Espana RN  Outcome: Ongoing  1/14/2020 0431 by Marni Lord.  Mariana Arriaga RN  Outcome: Ongoing

## 2020-01-14 NOTE — PROGRESS NOTES
Nephrology Progress Note    Assessment:  S/P Kidney transplant  Anemia  Brain tumor  Hypertension  Hx Gout      Plan: no change in medications  Rehab continues    Patient Active Problem List:     End stage kidney disease ([facility] [address] [facility]     Combined fat and carbohydrate induced hyperlipemia     Essential hypertension     Acid reflux     Gout     Angina, class IV (HCC)     Mixed dyslipidemia     Elevated blood uric acid level     Brain neoplasm (HCC)     Cerebral malignant neoplasm (HCC)     Generalized seizure (HCC)     Aphasia     Anxiety     Chronic pain disorder     Postherpetic neuralgia     Depression, major, recurrent, moderate (HCC)     Epigastric abdominal pain     History of colonic polyps     Immunosuppressive management encounter following kidney transplant     Pain in thoracic spine     Esophageal disorder     Abnormality of gait and mobility due to 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 [facility] admit 01/09/20. Cardiomegaly     Hypothyroid     Vitamin D deficiency     BMI 27.0-27.9,adult     Hx of kidney transplant      Subjective:  Admit Date: 1/9/2020    Interval History: feels  Tolerating rehab    Medications:  Scheduled Meds:   pantoprazole  20 mg Oral BID AC    calcium carbonate  500 mg Oral BID    sucralfate  1 g Oral TID AC    amLODIPine  5 mg Oral Daily    cinacalcet  30 mg Oral Daily    DULoxetine  30 mg Oral Daily    levETIRAcetam  750 mg Oral BID    metoprolol tartrate  25 mg Oral BID    mycophenolate  500 mg Oral BID    predniSONE  5 mg Oral Daily    sulfamethoxazole-trimethoprim  1 tablet Oral Daily    tacrolimus  2 mg Oral QPM    tacrolimus  3 mg Oral QAM     Continuous Infusions:    CBC: No results for input(s): WBC, HGB, PLT in the last 72 hours.   CMP:    Recent Labs     01/14/20  0602   *   K 4.3      CO2 22   BUN 23   CREATININE 0.90   GLUCOSE 90   CALCIUM 9.5   LABGLOM >60.0     Troponin: No results for input(s): TROPONINI in the last 72 hours. BNP: No results for input(s): BNP in the last 72 hours. INR: No results for input(s): INR in the last 72 hours. Lipids: No results for input(s): CHOL, LDLDIRECT, TRIG, HDL, AMYLASE, LIPASE in the last 72 hours. Liver: No results for input(s): AST, ALT, ALKPHOS, PROT, LABALBU, BILITOT in the last 72 hours. Invalid input(s): BILDIR  Iron:  No results for input(s): IRONS, FERRITIN in the last 72 hours. Invalid input(s): LABIRONS  Urinalysis: No results for input(s): UA in the last 72 hours.     Objective:  Vitals: BP (!) 146/88   Pulse 71   Temp 98 °F (36.7 °C) (Oral)   Resp 18   Ht 5' 1\" (1.549 m)   Wt 138 lb 3.7 oz (62.7 kg)   SpO2 98%   BMI 26.12 kg/m²    Wt Readings from Last 3 Encounters:   01/12/20 138 lb 3.7 oz (62.7 kg)   01/04/20 144 lb 11.2 oz (65.6 kg)   09/26/17 151 lb (68.5 kg)      24HR INTAKE/OUTPUT:  No intake or output data in the 24 hours ending 01/14/20 1127    General: alert, in no apparent distress  HEENT: normocephalic, atraumatic, anicteric  Neck: supple, no mass  Lungs: non-labored respirations, clear to auscultation bilaterally  Heart: regular rate and rhythm, 1/6 systolic murmurs or rubs  Abdomen: soft, non-tender, non-distended  Ext: no cyanosis, no peripheral edema left forearm fistula  Neuro: alert and oriented, no gross abnormalities  Psych: normal mood and affect  Skin: no rash      Electronically signed by Ubaldo Raman MD

## 2020-01-14 NOTE — PROGRESS NOTES
[facility]   Facility/Department: [facility]  Speech Language Pathology    Radha Marsh  1945  [id number]    Date: 1/14/2020      Speech Therapy attempted to see Radha Marsh on this date for a/an:    Treatment    Pt was unable to be seen due to: Other: Attempted to use video phone for Translation Zhanna on iPad, however, no answer was received on multiple attempts for up to 15 minutes.  Pt speaks very limited English and speech therapy was unable to commence without [facility]       Electronically signed by JOY Lee on 1/14/20 at 11:56 AM

## 2020-01-14 NOTE — PLAN OF CARE
Problem: Coping:  Goal: Ability to cope will improve  Description  Ability to cope will improve  Outcome: Ongoing     Problem: Skin Integrity:  Goal: Demonstration of wound healing without infection will improve  Description  Demonstration of wound healing without infection will improve  Outcome: Ongoing     Problem: Falls - Risk of:  Goal: Will remain free from falls  Description  Will remain free from falls  Outcome: Ongoing  Goal: Absence of physical injury  Description  Absence of physical injury  Outcome: Ongoing     Problem: Pain:  Goal: Pain level will decrease  Description  Pain level will decrease  Outcome: Ongoing  Goal: Control of acute pain  Description  Control of acute pain  Outcome: Ongoing  Goal: Control of chronic pain  Description  Control of chronic pain  Outcome: Ongoing

## 2020-01-14 NOTE — PROGRESS NOTES
Head Wo Contrast2020  Patient MRN: [id number] : 1945 Age:  69 years Order Date: 2020 8:45 AM. Gender: Female Exam: CT HEAD    Findings communicated directly with Dr. Mercedes Rico  on 2020 9:25 AM . 1. Abnormal exam. Large area of hypodensity and edema with mass effect on left frontal, temporal and parietal lobes and left basal ganglia with left to right subfalcine herniation of 0.56 cm. Findings are concerning for potential underlying mass/malignancy with extensive edema and mass effect versus cerebrovascular infarction/accident with edema and mass effect. Further evaluation MRI of the brain with and without IV contrast is recommended. 2. Vascular calcifications within the bilateral internal carotid artery cavernous segments and the vertebral arteries. Ct Chest  2020   1. THE PROXIMAL ESOPHAGUS IS MILDLY ECTATIC. THERE IS AN AREA OF PROXIMAL. STILL DILATATION WITH DEBRIS NOTED. FINDINGS MAY REPRESENT AN AREA OF IMPACTION. THERE MAY BE A ASSOCIATED ESOPHAGEAL DIVERTICULUM. NO LINDA PERFORATION. FURTHER EVALUATION RECOMMENDED. CONSIDER GI CONSULT 2. THE VISUALIZED PORTION OF KIDNEYS SHOW THAT THERE ARE ATROPHIC. THERE IS AREAS LOW-ATTENUATION BILATERALLY LIKELY RENAL CYST BUT NOT FULLY CHARACTERIZED IN THIS NONCONTRAST STUDY. CORRELATE CLINICALLY AND FOLLOW-UP. OTHER FINDINGS AS DETAILED ABOVE      Mra Head  2020  No aneurysm or high-grade stenosis, or vascular malformation of the visualized cerebral vasculature. No significant interval change in left frontal, parietal, and temporal lobe edema and 6 mm of rightward midline shift. Xr Chest  2020   Impression:  1. Possible nodular abnormality left lower lung, not well characterized, measuring 1.4 cm, correlation with CT scan chest recommended.  2. Enlarged cardiomediastinal silhouette, the possibility of underlying pericardial effusion or other cardiac abnormalities are not excluded on the basis of this exam, clinical correlation recommended. . 3. Nonspecific bibasilar airspace disease, findings can be seen in infiltrate/pneumonia and/or atelectasis. Rishabh Founds 4. Vascular calcifications thoracic aorta     Mri Brain W Wo 1/1/2020  1. Enhancing mass/malignancy in the left middle cranial fossa/left anterior temporal lobe, findings suspicious for meningioma although glioblastoma is not excluded. There is extensive edema extending throughout the left frontal lobe, left parietal lobe and left basal ganglia/temporal lobe, without evidence of restricted diffusion, greater than expected. Findings could reflect extensive edema from the mass/malignancy. Other etiologies are not excluded. . Neurosurgical/neurological evaluation recommended. 2. Left to right subfalcine herniation of 0.56 cm. Neurosurgical consultation and evaluation is recommended. 3. Dominant left vertebral artery        Previous extensive, complex labs, notes and diagnostics reviewed and analyzed. ALLERGIES:    Allergies as of 01/09/2020 - Review Complete 01/09/2020   Allergen Reaction Noted    Benadryl [diphenhydramine]  05/08/2017    Chlorhexidine  05/08/2017      (please also verify by checking MAR)    Yesterday I evaluated this patient for periodic reassessment of medical and functional status. The patient was discussed in detail at the treatment team meeting focusing on current medical issues, progress in therapies, social issues, psychological issues, barriers to progress and strategies to address these barriers, and discharge planning. See the hand written addendum to rehab progress note. The patient continues to be high risk for future disability and their medical and rehabilitation prognosis continue to be good and therefore, we will continue the patient's rehabilitation course as planned. The patient's tentative discharge date was set. Patient and family education was discussed. The patient was made aware of the team discussion regarding their progress.   Discharge plans were light to get nursing assistance to get up, bed and chair alarm. 8. Elevated DVT risk: progressive activities in PT, continue prophylaxis SREEDHAR hose, elevation and Lovenox discontinued due to increased GERD. 9. Complex discharge planning: Focus on balance and medical stability as well as healing plans discharge January 17, 2020 home with outpatient services. Status post weekly team meeting every Monday to assess progress towards goals, discuss and address social, psychological and medical comorbidities and to address difficulties they may be having progressing in therapy. Patient and family education is in progress. The patient is to follow-up with their family physician after discharge. Complex Active General Medical Issues that complicate care Assess & Plan:    1. End stage kidney disease,   Hx of kidney transplant-limit toxic medications recheck BMP and CBC titrate Prograf and CellCept titrate prednisone for the swelling at the tumor site. Patient is not on prednisone for the kidney transplant. Consult Dr. Chapin Martinez. 2.   Essential hypertension, occasional angina mixed dyslipidemia-vital signs every shift dose and titrate cardiac medications to include Norvasc Lopressor holding blood thinners postoperatively consult hospitalist for backup medical check troponins EKG was unremarkable 1/11/2020  3. Gout Elevated blood uric acid level-low purine diet when possible  4. Cerebral malignant neoplasm -status post resection follow-up with Dr. Lang Boxer as an outpatient  5. Generalized seizure -add Keppra titrate Decadron for postop swelling  6. Postherpetic neuralgia-titrate pain medications using lowest effective dose  7. Epigastric abdominal pain-abdominal binder Lidoderm  8. Immunosuppressive management encounter following kidney transplant-vitamin B12 vitamin D co-Q10 strict handwashing  9.    Pain in thoracic spine and generalized neuropathic pain-titrate Cymbalta, titrate Ultram and Tylenol and

## 2020-01-14 NOTE — PROGRESS NOTES
Physical Therapy Rehab Treatment Note  [facility]Department[facility]  [facility]       NAME: Jason Liang  : 1945 (18 y.o.)  MRN: [id number]  CODE STATUS: Full Code    Date of Service: 2020  Chart Reviewed: Yes  Family / Caregiver Present: Yes(daughter observed)    Restrictions:  Restrictions/Precautions: Fall Risk, Seizure    SUBJECTIVE:  Pt states she is tired but she is pushing to do better. Pts daughter states someone is usually always home during the day. States people are in and out. Pre and post Treatment Pain Screenin/10     OBJECTIVE:              Bed mobility  Rolling to Left: Modified independent  Rolling to Right: Modified independent  Supine to Sit: Modified independent  Sit to Supine: Modified independent    Transfers  Sit to Stand: Modified independent  Stand to sit: Modified independent    Ambulation  Ambulation?: Yes  Ambulation 1  Surface: level tile;uneven;carpet  Device: No Device  Assistance: Supervision  Quality of Gait: Pt unsteady x1 with head turn however able to correct. Decreased arm swing. Distance: [id number]    Stairs/Curb  Stairs?: Yes  Stairs  # Steps : 12  Stairs Height: 6\"  Rails: Right ascending  Assistance: Supervision        Exercises  Neurodevelopmental Techniques: Ambulating around dept locating and retreiving cones to challange balance and environmental scanning. Standing step taps on 4 in block. Other exercises  Other exercises 1: S/L hip abd x10, s/l clams x10, S/L SKTC x10 B, bridges x10 to increase hip strength. ASSESSMENT/COMMENTS:  Assessment: Overall imroved ambulatory balance with only 1 episode of unsteadiness with head turn. PLAN OF CARE/Safety:   Plan Comment: Cont.  POC  Therapy Time:   Individual   Time In 0900   Time Out 0930   Minutes 30     Minutes:      Transfer/Bed mobility trainin      Gait training:10      Neuro re education:10     Therapeutic ex:10      Renu Waller PTA, 20 at 9:53 AM

## 2020-01-14 NOTE — PROGRESS NOTES
1500         Minutes 60           ADL trainin minutes  Therapeutic activities: 9 minutes  Cognitive Retrainin minutes    Electronically signed by ELSI Rivero/L on 20 at 2:59 PM      Brock Cervantes OTR/NAZANIN

## 2020-01-14 NOTE — PROGRESS NOTES
Physical Therapy Rehab Treatment Note  Facility/Department: [facility]  Room: [facility]/V115-43       NAME: Ilia Manuel  : 1945 (80 y.o.)  MRN: [id number]  CODE STATUS: Full Code    Date of Service: 2020  Chart Reviewed: Yes  Family / Caregiver Present:No    Restrictions:  Restrictions/Precautions: Fall Risk, Seizure    SUBJECTIVE:  Pt with no new reports. Post Treatment Pain Screenin/10     OBJECTIVE:              Bed mobility  Rolling to Left: Modified independent  Rolling to Right: Modified independent  Supine to Sit: Modified independent  Sit to Supine: Modified independent    Transfers  Sit to Stand: Modified independent  Stand to sit: Modified independent    Ambulation  Ambulation?: Yes  Ambulation 1  Surface: level tile;uneven;carpet  Device: No Device  Assistance: Supervision  Quality of Gait: Narrow POOJA Decreased arm swing. Distance: [id number]    Stairs/Curb  Stairs?: Yes  Stairs  # Steps : 12  Stairs Height: 6\"  Rails: Right ascending  Assistance: Supervision        Exercises  Bridging: (x10)  Straight Leg Raise: x10  Heelslides: x10  Neurodevelopmental Techniques: DGI tasks: gait with head turns (verticle and horizontal) and gait at various speeds. Comments: Sink ex's x10 ea  Verbal and manual cues for posture and technique. ASSESSMENT/COMMENTS:  Assessment: Pt deviates out of st path with during gait drills with head turns however able to correct. PLAN OF CARE/Safety:   Plan Comment: Cont.  POC    Therapy Time:   Individual   Time In 1330   Time Out 1400   Minutes 30     Minutes:      Transfer/Bed mobility trainin      Gait training:10      Neuro re education:10     Therapeutic ex:Saloni Leyva PTA, 20 at 1:56 PM

## 2020-01-14 NOTE — PROGRESS NOTES
Dustin Barrios is a 69 y.o. female patient.  Pt was seen and evaluated, no overnight events, no new complaints, know the pt from previous encounters    Current Facility-Administered Medications   Medication Dose Route Frequency Provider Last Rate Last Dose    pantoprazole (PROTONIX) tablet 20 mg  20 mg Oral BID AC Ivana Scullin, DO   20 mg at 01/14/20 0604    calcium carbonate (TUMS) chewable tablet 500 mg  500 mg Oral BID Ivana Scullin, DO   500 mg at 01/14/20 0944    sucralfate (CARAFATE) 1 GM/10ML suspension 1 g  1 g Oral TID AC Ivana Scullin, DO   1 g at 01/14/20 0943    magnesium hydroxide (MILK OF MAGNESIA) 400 MG/5ML suspension 30 mL  30 mL Oral Daily PRN Gurdeep Staggers, APRN - CNP   30 mL at 01/11/20 2033    amLODIPine (NORVASC) tablet 5 mg  5 mg Oral Daily [place] Staggers, APRN - CNP   5 mg at 01/14/20 4861    cinacalcet (SENSIPAR) tablet 30 mg  30 mg Oral Daily Gurdeep Staggers, APRN - CNP   30 mg at 01/14/20 1210    DULoxetine (CYMBALTA) extended release capsule 30 mg  30 mg Oral Daily [place] Staggers, APRN - CNP   30 mg at 01/14/20 0943    levETIRAcetam (KEPPRA) tablet 750 mg  750 mg Oral BID [place] Staggers, APRN - CNP   750 mg at 01/14/20 0944    metoprolol tartrate (LOPRESSOR) tablet 25 mg  25 mg Oral BID Gurdeep Staggers, APRN - CNP   25 mg at 01/14/20 7963    mycophenolate (CELLCEPT) capsule 500 mg  500 mg Oral BID Gurdeep Staggers, APRN - CNP   500 mg at 01/14/20 0944    predniSONE (DELTASONE) tablet 5 mg  5 mg Oral Daily [place] Staggers, APRN - CNP   5 mg at 01/14/20 0943    sulfamethoxazole-trimethoprim (BACTRIM;SEPTRA) 400-80 MG per tablet 1 tablet  1 tablet Oral Daily [place] Staggers, APRN - CNP   1 tablet at 01/14/20 1210    tacrolimus (PROGRAF) capsule 2 mg  2 mg Oral QPM [place] Staggers, APRN - CNP   2 mg at 01/13/20 2117    tacrolimus (PROGRAF) capsule 3 mg  3 mg Oral QAM AMY Gonzalez CNP   3 mg at 01/14/20 0947    traMADol (ULTRAM) tablet 50 mg  50 mg Oral Q6H PRN Aria

## 2020-01-15 PROCEDURE — 97530 THERAPEUTIC ACTIVITIES: CPT

## 2020-01-15 PROCEDURE — 97129 THER IVNTJ 1ST 15 MIN: CPT

## 2020-01-15 PROCEDURE — 97116 GAIT TRAINING THERAPY: CPT

## 2020-01-15 PROCEDURE — 99232 SBSQ HOSP IP/OBS MODERATE 35: CPT | Performed by: PSYCHIATRY & NEUROLOGY

## 2020-01-15 PROCEDURE — 97130 THER IVNTJ EA ADDL 15 MIN: CPT

## 2020-01-15 PROCEDURE — 99221 1ST HOSP IP/OBS SF/LOW 40: CPT | Performed by: NURSE PRACTITIONER

## 2020-01-15 PROCEDURE — 97110 THERAPEUTIC EXERCISES: CPT

## 2020-01-15 PROCEDURE — 97535 SELF CARE MNGMENT TRAINING: CPT

## 2020-01-15 PROCEDURE — 99232 SBSQ HOSP IP/OBS MODERATE 35: CPT | Performed by: PHYSICAL MEDICINE & REHABILITATION

## 2020-01-15 PROCEDURE — 97112 NEUROMUSCULAR REEDUCATION: CPT

## 2020-01-15 PROCEDURE — 6370000000 HC RX 637 (ALT 250 FOR IP): Performed by: PHYSICAL MEDICINE & REHABILITATION

## 2020-01-15 PROCEDURE — 1180000000 HC REHAB R&B

## 2020-01-15 PROCEDURE — 6370000000 HC RX 637 (ALT 250 FOR IP): Performed by: NURSE PRACTITIONER

## 2020-01-15 RX ADMIN — TACROLIMUS 2 MG: 1 CAPSULE ORAL at 20:01

## 2020-01-15 RX ADMIN — TACROLIMUS 3 MG: 1 CAPSULE ORAL at 10:37

## 2020-01-15 RX ADMIN — MYCOPHENOLATE MOFETIL 500 MG: 250 CAPSULE ORAL at 10:36

## 2020-01-15 RX ADMIN — CINACALCET HYDROCHLORIDE 30 MG: 30 TABLET, FILM COATED ORAL at 12:00

## 2020-01-15 RX ADMIN — LEVETIRACETAM 750 MG: 250 TABLET ORAL at 10:38

## 2020-01-15 RX ADMIN — PREDNISONE 5 MG: 5 TABLET ORAL at 10:38

## 2020-01-15 RX ADMIN — PANTOPRAZOLE SODIUM 20 MG: 20 TABLET, DELAYED RELEASE ORAL at 06:44

## 2020-01-15 RX ADMIN — DULOXETINE HYDROCHLORIDE 30 MG: 30 CAPSULE, DELAYED RELEASE ORAL at 10:38

## 2020-01-15 RX ADMIN — METOPROLOL TARTRATE 25 MG: 25 TABLET, FILM COATED ORAL at 20:01

## 2020-01-15 RX ADMIN — SUCRALFATE 1 G: 1 SUSPENSION ORAL at 10:39

## 2020-01-15 RX ADMIN — METOPROLOL TARTRATE 25 MG: 25 TABLET, FILM COATED ORAL at 10:38

## 2020-01-15 RX ADMIN — ANTACID TABLETS 500 MG: 500 TABLET, CHEWABLE ORAL at 20:01

## 2020-01-15 RX ADMIN — SULFAMETHOXAZOLE AND TRIMETHOPRIM 1 TABLET: 400; 80 TABLET ORAL at 12:00

## 2020-01-15 RX ADMIN — PANTOPRAZOLE SODIUM 20 MG: 20 TABLET, DELAYED RELEASE ORAL at 18:02

## 2020-01-15 RX ADMIN — SUCRALFATE 1 G: 1 SUSPENSION ORAL at 18:02

## 2020-01-15 RX ADMIN — ANTACID TABLETS 500 MG: 500 TABLET, CHEWABLE ORAL at 10:39

## 2020-01-15 RX ADMIN — AMLODIPINE BESYLATE 5 MG: 5 TABLET ORAL at 10:39

## 2020-01-15 RX ADMIN — LEVETIRACETAM 750 MG: 250 TABLET ORAL at 20:00

## 2020-01-15 RX ADMIN — MYCOPHENOLATE MOFETIL 500 MG: 250 CAPSULE ORAL at 20:01

## 2020-01-15 RX ADMIN — SUCRALFATE 1 G: 1 SUSPENSION ORAL at 06:44

## 2020-01-15 ASSESSMENT — PAIN DESCRIPTION - LOCATION: LOCATION: HEAD

## 2020-01-15 ASSESSMENT — ENCOUNTER SYMPTOMS
CONSTIPATION: 0
VOMITING: 0
SINUS PAIN: 0
TROUBLE SWALLOWING: 0
DIARRHEA: 0
SHORTNESS OF BREATH: 0
WHEEZING: 0
BACK PAIN: 0
COUGH: 0
SORE THROAT: 0
CHEST TIGHTNESS: 0
COLOR CHANGE: 0
NAUSEA: 0
ABDOMINAL PAIN: 0

## 2020-01-15 ASSESSMENT — PAIN DESCRIPTION - DESCRIPTORS: DESCRIPTORS: ACHING

## 2020-01-15 ASSESSMENT — PAIN DESCRIPTION - PAIN TYPE: TYPE: ACUTE PAIN

## 2020-01-15 ASSESSMENT — PAIN SCALES - GENERAL
PAINLEVEL_OUTOF10: 0
PAINLEVEL_OUTOF10: 0
PAINLEVEL_OUTOF10: 5

## 2020-01-15 NOTE — PROGRESS NOTES
[facility]   Facility/Department: [facility]  Speech Language Pathology  Insurance Progress Note           Glenis Irvin  1945  [id number]/[id number]      Date: 1/15/2020      SPEECH THERAPY    CURRENT GOALS:  Short-term Goals  Timeframe for Short-term Goals: 1-2 weeks  Goal 1: Pt will follow 2-3 step directions given orally with 85% accuracy with min cues to increase the pt's ability to follow directions provided by caregivers for safe follow through with ADLs. Goal 2: Pt will answer mid-high level Wh- questions with 85% accuracy with min cues to assist the caregiver in obtaining important information regarding the patient's personal, medical, and safety needs. Goal 3: To address pt's cognitive deficits and promote recall of personal and medical information, pt will answer questions addressing (remote, recent, delayed) recall with 80% accuracy and stand by cues. Goal 4: Pt will complete RIPA in 1-5 days to guide POC and treatment with use of  services  Long-term Goals  Timeframe for Long-term Goals: 1-2 weeks  Goal 1: Pt will improve her Receptive Language abilities to a modified independent level for comprehension of conversation and safety directions with familiar and unfamiliar communication partners. Goal 2: Pt will improve her Cognition from min assist to supervision for adequate functional recall and safety awareness for home. Vj Crump Short-term Goals  Goal 1: N/A  Compensatory Swallowing Strategies: Upright as possible for all oral intake      UPDATED GOALS:  Goal 1: Pt will follow 2-3 step directions given orally with 85% accuracy with min cues to increase the pt's ability to follow directions provided by caregivers for safe follow through with ADLs. Goal 2: To increase safety awareness and judgment for safe completion of ADLs secondary to pt's cognitive deficits, pt will sequence common activities of daily living with (verbal/written) steps with 80% accuracy and mild cues. Goal 3:  To address pt's cognitive deficits and promote recall of personal and medical information, pt will answer questions addressing (remote, recent, delayed) recall with 80% accuracy and stand by cues. Goal 4: Pt will be educated on word-finding strategies, and use them in structured and unstructured tasks in 90% of given opportunities with mild verbal cues to help the pt express his/her personal, safety, and medical needs in the presence of language deficits. Goal 5: To increase safety awareness and judgment for safe completion of ADLs secondary to pt's cognitive deficits, pt will complete abstract reasoning tasks (i.e. Word deduction, convergent and divergent naming, similarities/differences) with 80% accuracy and min cues. Long-term Goals  Timeframe for Long-term Goals: 1-2 weeks  Goal 1: Pt will improve her language abilities to a modified independent level for comprehension of conversation and safety directions with familiar and unfamiliar communication partners and to improve the ability to express complex personal and medical information. Goal 2: Pt will improve her Cognition from min assist to supervision for adequate functional recall and safety awareness for home. .        Patient's Response to Therapy:  Ms. Deena Deras has been an active participant in her speech therapy sessions thus far. She requires an  to target cognitive-communicative deficits in her native language. Ms. Deena Deras and her daughter report decreased word finding and overall ability to express her complex wants, needs, feelings, and ideas. Speech therapy remains medically necessary due to Ms. Delatorre's current mild cognitive-communicative impairment negatively impacting her level of safety and independence. Goals have been adjusted above after a thorough discussion that took place with the patient and her family.             NATIONAL OUTCOMES MEASUREMENT SYSTEM (NOMS):  CURRENT NOMS:    SPOKEN LANGUAGE COMPREHENSION  Rating:

## 2020-01-15 NOTE — PROGRESS NOTES
Occupational Therapy  Facility/Department: [facility]  Daily Treatment Note  NAME: Brigid Miles  : 1945  MRN: [id number]    Date of Service: 1/15/2020    Discharge Recommendations:  Continue to assess pending progress       Assessment      REQUIRES OT FOLLOW UP: Yes  Activity Tolerance  Activity Tolerance: Patient Tolerated treatment well  Activity Tolerance: Pt reported being tired  Safety Devices  Safety Devices in place: Yes  Type of devices: All fall risk precautions in place         Patient Diagnosis(es): The primary encounter diagnosis was Cerebral malignant neoplasm ([facility] [address] 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 [facility] admit 20., Brain neoplasm [facility]), and Anxiety were also pertinent to this visit. has a past medical history of Anxiety, Cardiomegaly, Chronic kidney disease, Colon polyps, Depression, Gallbladder polyp, GERD (gastroesophageal reflux disease), Gout, Gout, Hypertension, Hyperuricemia, Hypothyroid, and Vitamin D deficiency. has a past surgical history that includes Dialysis fistula creation (Left); Kidney transplant (2018); craniotomy (N/A, 1/3/2020); and Upper gastrointestinal endoscopy (N/A, 2020).     Restrictions  Restrictions/Precautions  Restrictions/Precautions: Fall Risk, Seizure  Subjective   General  Chart Reviewed: Yes  Response to previous treatment: Patient with no complaints from previous session  Family / Caregiver Present: Yes  Referring Practitioner: Dr Betty Vasquez  Diagnosis: NTBI secondary to craniotomy for removal of left temporal anterior intracerebral tumor  Pain Assessment  Pain Assessment: 0-10  Pain Level: 5  Pain Type: Acute pain  Pain Location: Head  Pain Descriptors: Aching  Pre Treatment Pain Screening  Intervention List: Patient able to continue with treatment;Nurse called to administer meds  Comments / Details: Pt requested Meds for after therapy session. Jose Dry LPN contacted [facility] meds. Vital Signs  Patient Currently in Pain: Yes   Orientation:  A&O x2     Objective              Coordination/Problem solving  Fine Motor: Pt instructed In problem solving of non interlocking [place] puzzle and Ocean scene interlocking puzzle activity. Pt min verb and visual cues with extended time required to complete both puzzles. Pt had more difficulty with the interlocking puzzle. Plan   Plan  Times per week: 5-7x/wk 1-1 1/2 weeks  Current Treatment Recommendations: Strengthening, Functional Mobility Training, Home Management Training, Equipment Evaluation, Education, & procurement, Endurance Training, Balance Training, Neuromuscular Re-education, Self-Care / ADL, Safety Education & Training               Goals  Patient Goals   Patient goals :  To return to home when ready       Therapy Time   Individual Concurrent Group Co-treatment   Time In 0930         Time Out 1030         Minutes 60           Electronically signed by ELSI Lang/L on 1/15/20 at 12:57 PM  Cognitive Retrainin minutes       Nohemy Cervantes OTR/L

## 2020-01-15 NOTE — PROGRESS NOTES
Subjective: The patient complains of severe  acute on chronic progressive balance deficits partially relieved by recent craniotomy with anterior tumor resection in the temporal area, PT, OT and exacerbated by poor sleep. I am concerned about patients complaints of chest pain in the a.m. of 1/11/2020 EKG is unremarkable troponins are pending. I am also concerned about her very poor balance. We are focusing on not in therapy and she seems to be improving nicely. She needs to be 100% independent when she goes home as her daughter works full-time. She seems to be improving nicely I am keeping her until Friday for more family training and to make her independent in the room. She will go home with her daughter at discharge. ROS x10: The patient also complains of severely impaired mobility and activities of daily living. Otherwise no new problems with vision, hearing, nose, mouth, throat, dermal, cardiovascular, GI, , pulmonary, musculoskeletal, psychiatric or neurological. See Rehab H&P on Rehab chart dated . Vital signs:  /71   Pulse 77   Temp 98 °F (36.7 °C) (Oral)   Resp 18   Ht 5' 1\" (1.549 m)   Wt 138 lb 3.7 oz (62.7 kg)   SpO2 96%   BMI 26.12 kg/m²   I/O:   PO/Intake:  fair PO intake, no problems observed or reported. Bowel/Bladder:  Continent, severe opiate related constipation  General:  Patient is well developed, adequately nourished, non-obese and     well kempt. HEENT:    PERRLA, hearing intact to loud voice, external inspection of ear     and nose benign. Inspection of lips, tongue and gums benign  Musculoskeletal: No significant change in strength or tone. All joints stable. Inspection and palpation of digits and nails show no clubbing,       cyanosis or inflammatory conditions. Neuro/Psychiatric: Affect: flat but pleasant. Alert and oriented to person, place and     situation.   No significant change in deep tendon reflexes or     sensation  Lungs:  CTA-B. Respiration effort is normal at rest.     Heart:   S1 = S2, RRR. No loud murmurs. Abdomen:  Soft, non-tender, no enlargement of liver or spleen. Extremities:  No significant lower extremity edema or tenderness. Skin:   Intact to general survey, no visualized or palpated problems. Healing craniotomy incision    Rehabilitation:  Physical therapy: FIMS:  Bed Mobility: Scooting: Independent    Transfers: Sit to Stand: Modified independent  Stand to sit: Modified independent  Bed to Chair: Modified independent, Ambulation 1  Surface: level tile, uneven, carpet  Device: No Device  Assistance: Supervision  Quality of Gait: Pt unsteady x1 with head turn however able to correct. Decreased arm swing. Gait Deviations: Slow Marisa, Decreased step length, Decreased arm swing, Deviated path  Distance: [id number]  Comments: Supervision gait in pt's room. Pt safely reaches out for furniture to steady self when able.  , Stairs  # Steps : 12  Stairs Height: 6\"  Rails: Right ascending  Assistance: Supervision  Comment: Steady pace. Reciprocal pattern. FIMS:  ,  , Assessment: Pt deviates out of st path with during gait drills with head turns however able to correct. Occupational therapy: FIMS:   ,  ,      Speech therapy: FIMS:        Lab/X-ray studies reviewed, analyzed and discussed with patient and staff:   No results found for this or any previous visit (from the past 24 hour(s)). Ct Head Wo  2020   INTERVAL CRANIOTOMY WITH PROBABLE RESECTION REMOVAL OF LEFT anterior MIDDLE CRANIAL FOSSA TUMOR. PERSISTENT DIFFUSE VASOGENIC EDEMA OF THE LEFT FRONTAL PARIETAL TEMPORAL LOBES PERSISTENT EFFACEMENT OF THE LEFT LATERAL VENTRICLE WITH SHIFT OF MIDLINE TO THE RIGHT.  OTHER FINDINGS DETAILED ABOVE      Ct Head Wo Contrast2020  Patient MRN: [id number] : 1945 Age:  69 years Order Date: 2020 8:45 AM. Gender: Female Exam: CT HEAD    Findings communicated directly with  Bess  on 1/1/2020 9:25 AM . 1. Abnormal exam. Large area of hypodensity and edema with mass effect on left frontal, temporal and parietal lobes and left basal ganglia with left to right subfalcine herniation of 0.56 cm. Findings are concerning for potential underlying mass/malignancy with extensive edema and mass effect versus cerebrovascular infarction/accident with edema and mass effect. Further evaluation MRI of the brain with and without IV contrast is recommended. 2. Vascular calcifications within the bilateral internal carotid artery cavernous segments and the vertebral arteries. Ct Chest  1/4/2020   1. THE PROXIMAL ESOPHAGUS IS MILDLY ECTATIC. THERE IS AN AREA OF PROXIMAL. STILL DILATATION WITH DEBRIS NOTED. FINDINGS MAY REPRESENT AN AREA OF IMPACTION. THERE MAY BE A ASSOCIATED ESOPHAGEAL DIVERTICULUM. NO LINDA PERFORATION. FURTHER EVALUATION RECOMMENDED. CONSIDER GI CONSULT 2. THE VISUALIZED PORTION OF KIDNEYS SHOW THAT THERE ARE ATROPHIC. THERE IS AREAS LOW-ATTENUATION BILATERALLY LIKELY RENAL CYST BUT NOT FULLY CHARACTERIZED IN THIS NONCONTRAST STUDY. CORRELATE CLINICALLY AND FOLLOW-UP. OTHER FINDINGS AS DETAILED ABOVE      Mra Head  1/2/2020  No aneurysm or high-grade stenosis, or vascular malformation of the visualized cerebral vasculature. No significant interval change in left frontal, parietal, and temporal lobe edema and 6 mm of rightward midline shift. Xr Chest  1/1/2020   Impression:  1. Possible nodular abnormality left lower lung, not well characterized, measuring 1.4 cm, correlation with CT scan chest recommended. 2. Enlarged cardiomediastinal silhouette, the possibility of underlying pericardial effusion or other cardiac abnormalities are not excluded on the basis of this exam, clinical correlation recommended. . 3. Nonspecific bibasilar airspace disease, findings can be seen in infiltrate/pneumonia and/or atelectasis. Dionisio Santos  4. Vascular calcifications thoracic aorta     Mri Brain W Wo

## 2020-01-15 NOTE — PROGRESS NOTES
Pt is AO x 4 and pleasant, needs safety direction at time to ring approprietly, focal assessment performed and in agreement with LPN, call light within reach for pt safety bed alarm engaged for pt safety Electronically signed by Aure Daugherty RN on 1/15/2020 at 10:15 AM

## 2020-01-15 NOTE — PROGRESS NOTES
Pt complained of headache pain during therapy this morning but stated it had gone away and didn't want tylenol. No further concerns at this time.  Electronically signed by Emerald Xavier LPN on 6/11/2062 at 26:62 PM

## 2020-01-15 NOTE — PROGRESS NOTES
Occupational Therapy  Facility/Department: [facility]  Daily Treatment Note  NAME: Hieu Raymundo  : 1945  MRN: [id number]    Date of Service: 1/15/2020    Discharge Recommendations:  Continue to assess pending progress       Assessment      REQUIRES OT FOLLOW UP: Yes  Activity Tolerance  Activity Tolerance: Patient Tolerated treatment well  Activity Tolerance: Pt reported being tired  Safety Devices  Safety Devices in place: Yes  Type of devices: All fall risk precautions in place         Patient Diagnosis(es): The primary encounter diagnosis was Cerebral malignant neoplasm ([facility] [address] 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 [facility] admit 20., Brain neoplasm [facility]), and Anxiety were also pertinent to this visit. has a past medical history of Anxiety, Cardiomegaly, Chronic kidney disease, Colon polyps, Depression, Gallbladder polyp, GERD (gastroesophageal reflux disease), Gout, Gout, Hypertension, Hyperuricemia, Hypothyroid, and Vitamin D deficiency. has a past surgical history that includes Dialysis fistula creation (Left); Kidney transplant (2018); craniotomy (N/A, 1/3/2020); and Upper gastrointestinal endoscopy (N/A, 2020).     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

## 2020-01-15 NOTE — CARE COORDINATION
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 Pt physical ability to be independent in room/suite:  [x] Yes    []   No   Comment:    2. Pt demonstrates cognitive ability to be independent in room/suite:  [x] Yes    []   No   Comment:    3. Pt demonstrates emotional ability to be independent in room/suite:  [x] Yes    []   No   Comment:    4. Special Considerations/Equipment if needed: [] NA   Comment:    Recommend independent in room/suite:  [x] Yes    []   No            Signature: Carlos Jean Baptiste RN    Occupational Therapy:  1. Pt physical ability to be independent in room/suite:  [x] Yes    []   No   Comment:     2. Pt demonstrates cognitive ability to be independent in room/suite:  [x] Yes    []   No   Comment:    3. Pt demonstrates emotional ability to be independent in room/suite:  [x] Yes    []   No   Comment:    4. Special Considerations/Equipment if needed: [x] NA   Comment:    Recommend independent in room/suite:  [x] Yes    []   No       Signature: Electronically signed by SHANE De La Torre on 1/15/20 at 3:39 PM        Physical Therapy:  1. Pt physical ability to be independent in room/suite:  [x] Yes    []   No   Comment:     2. Pt demonstrates cognitive ability to be independent in room/suite:  [x] Yes    []   No   Comment:    3. Pt demonstrates emotional ability to be independent in room/suite:  [x] Yes    []   No   Comment:    4.  Special Considerations/Equipment if needed: [x] NA   Comment:    Recommend independent in room/suite:  [] Yes    []   No       Signature: Electronically signed by Richard Richey PTA on 1/15/2020 at 3:25 PM      Physician: The recommendations and comments have been reviewed.           Physician Signature: Dr Daryle Slater, DO  Electronically signed by Elsa Vera RN on 1/15/20 at 5:41 PM

## 2020-01-15 NOTE — PROGRESS NOTES
Neurology Daily Progress Note  Name: Lance Mccartney  Age: 69 y.o. Gender: female  CodeStatus: Full Code  Allergies: Benadryl [Diphenhydramine]  Chlorhexidine    Chief Complaint:No chief complaint on file. Primary Care Provider: Terry Pena DO  InpatientTreatment Team: Treatment Team: Attending Provider: Mg Soto DO; Consulting Physician: Ching Smith MD; Consulting Physician: Deidre Ferrer MD; Consulting Physician: Elena Torres DO; Hospitalist: Aden Conley MD; Consulting Physician: Harsh Pickard MD; Occupational Therapist: Aj Nguyen, OTR/L; Registered Nurse: Mirlande Leonardo, DIPAK; Occupational Therapist Assistant: Brielle Dawn, Brandy Collado; LPN: Karen Collado LPN; Registered Nurse: Patrick Francis RN  Admission Date: 1/9/2020      HPI Pt seen and examined on rehab unit for neurology follow up. Pt was admitted to [facility] from 1/1/9 to 1/9/20 for 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 Pt had left craniotomy on 1/3/20. Hospital records reviewed. Started on decadron and Keppra. No seizure activity. Aphasia improving. Decadron wean completed. Currently A&O x3, NAD, cooperative. No focal deficits. Afebrile. Denies Headache, double vision, blurry vision, difficulty with speech, difficulty with swallowing, weakness, numbness, pain, nausea, vomiting, choking, neck pain, dizziness. No acute neuro complaints/concerns    Vitals:    01/15/20 1201   BP:    Pulse: 72   Resp:    Temp:    SpO2:       Review of Systems   Constitutional: Negative for appetite change, chills, fatigue and fever. HENT: Negative for hearing loss and trouble swallowing. Eyes: Negative for visual disturbance. Respiratory: Negative for cough, chest tightness, shortness of breath and wheezing. Cardiovascular: Negative for chest pain, palpitations and leg swelling. Gastrointestinal: Negative for nausea and vomiting. Musculoskeletal: Negative for gait problem.    Skin: Negative for color change and rash. Neurological: Negative for dizziness, tremors, seizures, syncope, facial asymmetry, speech difficulty, weakness, light-headedness, numbness and headaches. Psychiatric/Behavioral: Negative for agitation, confusion and hallucinations. The patient is not nervous/anxious. Physical Exam  Vitals signs and nursing note reviewed. Constitutional:       General: She is not in acute distress. Appearance: She is not diaphoretic. HENT:      Head: Normocephalic. Comments: Left surgical site well approx, no erythema or drainange  Eyes:      Extraocular Movements: Extraocular movements intact. Pupils: Pupils are equal, round, and reactive to light. Cardiovascular:      Rate and Rhythm: Normal rate and regular rhythm. Pulmonary:      Effort: Pulmonary effort is normal. No respiratory distress. Breath sounds: Normal breath sounds. Abdominal:      General: Bowel sounds are normal. There is no distension. Palpations: Abdomen is soft. Tenderness: There is no tenderness. Skin:     General: Skin is warm and dry. Neurological:      General: No focal deficit present. Mental Status: She is alert and oriented to person, place, and time. Cranial Nerves: No cranial nerve deficit. Sensory: No sensory deficit. Motor: No weakness.       Coordination: Coordination normal.         Medications:  Reviewed    Infusion Medications:   Scheduled Medications:    pantoprazole  20 mg Oral BID AC    calcium carbonate  500 mg Oral BID    sucralfate  1 g Oral TID AC    amLODIPine  5 mg Oral Daily    cinacalcet  30 mg Oral Daily    DULoxetine  30 mg Oral Daily    levETIRAcetam  750 mg Oral BID    metoprolol tartrate  25 mg Oral BID    mycophenolate  500 mg Oral BID    predniSONE  5 mg Oral Daily    sulfamethoxazole-trimethoprim  1 tablet Oral Daily    tacrolimus  2 mg Oral QPM    tacrolimus  3 mg Oral QAM     PRN Meds: magnesium hydroxide, traMADol, acetaminophen, tonsillar herniation. There is a dominant left vertebral artery, otherwise, normal expected appearance of visualized T2 flow voids. Optic globes and orbital contents are within normal limits. Visualized paranasal sinuses and mastoid air cells are free from fluid or mass. . No   intraparenchymal hemorrhage. Pulsation artifact limits evaluation of the postcontrast exam and the cerebellum. Focal large enhancing mass in the left middle cranial fossa cyst left lung/left anterior temporal lobe, measuring approximately 2.3 cm anterior posterior by 2.1 cm   transverse by 1.5 cm craniocaudal.    No dural venous sinus thrombosis or occlusion is identified. No other enhancing mass is seen. Impression 1. Enhancing mass/malignancy in the left middle cranial fossa/left anterior temporal lobe, findings suspicious for meningioma although glioblastoma is not excluded. There is extensive edema extending throughout the left frontal lobe, left parietal lobe   and left basal ganglia/temporal lobe, without evidence of restricted diffusion, greater than expected. Findings could reflect extensive edema from the mass/malignancy. Other etiologies are not excluded. . Neurosurgical/neurological evaluation recommended. 2. Left to right subfalcine herniation of 0.56 cm. Neurosurgical consultation and evaluation is recommended. 3. Dominant left vertebral artery         No results found for this or any previous visit. MRA of the Head and Neck: No results found for this or any previous visit. No results found for this or any previous visit. Results for orders placed during the hospital encounter of 01/01/20   MRA HEAD WO CONTRAST    Narrative EXAM: MR angiography of the head without contrast.    History: Meningioma    Technique: Multiplanar multisequence MRI of the brain was performed. 3-D time-of-flight axial images were performed through the [place] [facility] [place].  3-D volume rendered images of the [place] [facility] [place] performed. Comparison: MRI of the brain from January 1, 2000    Findings: The bilateral distal cervical internal carotid arteries through the skull base are patent. The bilateral middle cerebral arteries through the trifurcation and opercular branches are patent. The vertebrobasilar system including the superior cerebellar and posterior cerebral arteries are patent. Left vertebral artery is dominant. Posterior communicating arteries are patent. The bilateral anterior cerebral arteries and anterior communicating artery are patent. No aneurysm or high-grade stenosis of the visualized cerebral vasculature. No significant interval change in edema of the left frontal, parietal, and temporal lobes. No significant interval change of 6 mm of rightward midline shift. Again identified is a 2.3 cm mass within the anterior left middle cranial fossa as detailed on   recent MRI of the brain. Impression No aneurysm or high-grade stenosis, or vascular malformation of the visualized cerebral vasculature. No significant interval change in left frontal, parietal, and temporal lobe edema and 6 mm of rightward midline shift. CT of the Head:   Results for orders placed during the hospital encounter of 01/01/20   CT HEAD WO CONTRAST    Narrative CT HEAD WO CONTRAST    CLINICAL HISTORY:  Postop     COMPARISON: January 1, 2020. Clinical note: Please see MRI report of brain January 1, 2024 additional details    TECHNIQUE: Multiple unenhanced serial axial images of the brain from the vertex of the skull to the base of the skull were performed. FINDINGS: There is been interval left-sided craniotomy since prior examination. There is pneumocephaly seen anterior to the left frontal lobe and anterior to the left temporal lobe. Most likely postoperative.   There is still effacement of the frontal, body and posterior horn of the left lateral ventricle with shift of the midline to the right of approximately 8 to 9 mm. The cisterns remain grossly unchanged. Again note is made of diffuse decreased attenuation throughout the subcortical white matter of the left frontal, parietal and temporal lobes most likely consistent with vasogenic edema. There are changes within the anterior aspect of the left middle cranial fossa which May reflect postoperative changes at site of tumor resection, removal..    The visualized portion of the paranasal sinuses, and mastoids are unremarkable. There are soft tissue changes in subcutaneous emphysema seen overlying left frontal parietal region and in the infratemporal fossa. Most likely postsurgical.      Impression INTERVAL CRANIOTOMY WITH PROBABLE RESECTION REMOVAL OF LEFT anterior MIDDLE CRANIAL FOSSA TUMOR. PERSISTENT DIFFUSE VASOGENIC EDEMA OF THE LEFT FRONTAL PARIETAL TEMPORAL LOBES  PERSISTENT EFFACEMENT OF THE LEFT LATERAL VENTRICLE WITH SHIFT OF MIDLINE TO THE RIGHT. OTHER FINDINGS DETAILED ABOVE    All CT scans at this facility use dose modulation, iterative reconstruction, and/or weight based dosing when appropriate to reduce radiation dose to as low as reasonably achievable. No results found for this or any previous visit. No results found for this or any previous visit. Carotid duplex: No results found for this or any previous visit. No results found for this or any previous visit. No results found for this or any previous visit. Echo No results found for this or any previous visit. Assessment/Plan:    Left cerebral tumor with cerebral edema  S/p left craniotomy 1/3/20  Pathology shows meningioma, meningothelial type  Expressive aphasia, improved  Cont Keppra  Decadron wean completed  Hx kidney transplant on immunosuppressive drugs  PT/OT/ST  Improving    Deandre DARRYN Kitchen MD, [address], American Board of Psychiatry & Neurology  Board Certified in Vascular Neurology  Board Certified in Neuromuscular Medicine  Certified in Neurorehabilitation         Collaborating physicians: Dr Jose Alfredo Gibbons    Electronically signed by Gilford Buffalo, APRN - CNP on 1/15/2020 at 4:01 PM

## 2020-01-15 NOTE — PROGRESS NOTES
Nutrition Assessment (Low Risk)    Type and Reason for Visit: Initial(noted wound healing supplement ordered upon transfer from Medical floor)    Nutrition Recommendations: Continue current diet, Discontinue ONS    Nutrition Assessment:  Patient assessed for nutritional risk. Deemed to be at low risk at this time. Will continue to monitor for changes in status. Pt with good intake at meals with no nutritional complaints/surgical wound healing well. Will discontinue wound healing supplement.     Malnutrition Assessment:  · Malnutrition Status:  No Malnutrition    Nutrition Risk Level   Risk Level: Low    Nutrition Diagnosis:   · Problem: Increased nutrient needs(resolved)  · Etiology: Increased demand for energy/nutrients    Signs and symptoms: Presence of wounds(healed)    Nutrition Intervention:  Food and/or Delivery: Continue current diet, Discontinue ONS  Nutrition Education/Counseling/Coordination of Care:  Continued Inpatient Monitoring      Electronically signed by Jean-Pierre Chandler RD, LD on 1/15/20 at 1:52 PM

## 2020-01-16 PROCEDURE — 1180000000 HC REHAB R&B

## 2020-01-16 PROCEDURE — 97116 GAIT TRAINING THERAPY: CPT

## 2020-01-16 PROCEDURE — 97112 NEUROMUSCULAR REEDUCATION: CPT

## 2020-01-16 PROCEDURE — 97129 THER IVNTJ 1ST 15 MIN: CPT

## 2020-01-16 PROCEDURE — 6370000000 HC RX 637 (ALT 250 FOR IP): Performed by: PHYSICAL MEDICINE & REHABILITATION

## 2020-01-16 PROCEDURE — 6370000000 HC RX 637 (ALT 250 FOR IP): Performed by: NURSE PRACTITIONER

## 2020-01-16 PROCEDURE — 97110 THERAPEUTIC EXERCISES: CPT

## 2020-01-16 PROCEDURE — 97530 THERAPEUTIC ACTIVITIES: CPT

## 2020-01-16 PROCEDURE — 99232 SBSQ HOSP IP/OBS MODERATE 35: CPT | Performed by: PHYSICAL MEDICINE & REHABILITATION

## 2020-01-16 PROCEDURE — 97535 SELF CARE MNGMENT TRAINING: CPT

## 2020-01-16 RX ADMIN — PANTOPRAZOLE SODIUM 20 MG: 20 TABLET, DELAYED RELEASE ORAL at 06:20

## 2020-01-16 RX ADMIN — LEVETIRACETAM 750 MG: 250 TABLET ORAL at 20:29

## 2020-01-16 RX ADMIN — TACROLIMUS 2 MG: 1 CAPSULE ORAL at 20:29

## 2020-01-16 RX ADMIN — SUCRALFATE 1 G: 1 SUSPENSION ORAL at 06:20

## 2020-01-16 RX ADMIN — SUCRALFATE 1 G: 1 SUSPENSION ORAL at 12:35

## 2020-01-16 RX ADMIN — TACROLIMUS 3 MG: 1 CAPSULE ORAL at 09:28

## 2020-01-16 RX ADMIN — SULFAMETHOXAZOLE AND TRIMETHOPRIM 1 TABLET: 400; 80 TABLET ORAL at 12:35

## 2020-01-16 RX ADMIN — MYCOPHENOLATE MOFETIL 500 MG: 250 CAPSULE ORAL at 20:29

## 2020-01-16 RX ADMIN — PREDNISONE 5 MG: 5 TABLET ORAL at 09:28

## 2020-01-16 RX ADMIN — ANTACID TABLETS 500 MG: 500 TABLET, CHEWABLE ORAL at 09:28

## 2020-01-16 RX ADMIN — ANTACID TABLETS 500 MG: 500 TABLET, CHEWABLE ORAL at 20:29

## 2020-01-16 RX ADMIN — LEVETIRACETAM 750 MG: 250 TABLET ORAL at 09:28

## 2020-01-16 RX ADMIN — DULOXETINE HYDROCHLORIDE 30 MG: 30 CAPSULE, DELAYED RELEASE ORAL at 09:28

## 2020-01-16 RX ADMIN — METOPROLOL TARTRATE 25 MG: 25 TABLET, FILM COATED ORAL at 20:29

## 2020-01-16 RX ADMIN — CINACALCET HYDROCHLORIDE 30 MG: 30 TABLET, FILM COATED ORAL at 12:34

## 2020-01-16 RX ADMIN — METOPROLOL TARTRATE 25 MG: 25 TABLET, FILM COATED ORAL at 09:28

## 2020-01-16 RX ADMIN — SUCRALFATE 1 G: 1 SUSPENSION ORAL at 15:50

## 2020-01-16 RX ADMIN — PANTOPRAZOLE SODIUM 20 MG: 20 TABLET, DELAYED RELEASE ORAL at 15:50

## 2020-01-16 RX ADMIN — MYCOPHENOLATE MOFETIL 500 MG: 250 CAPSULE ORAL at 09:28

## 2020-01-16 RX ADMIN — AMLODIPINE BESYLATE 5 MG: 5 TABLET ORAL at 09:28

## 2020-01-16 ASSESSMENT — PAIN SCALES - GENERAL
PAINLEVEL_OUTOF10: 0

## 2020-01-16 NOTE — CARE COORDINATION
Cardiomegaly 01/09/2020    Hypothyroid 01/09/2020    Vitamin D deficiency 01/09/2020    BMI 27.0-27.9,adult 01/09/2020    Hx of kidney transplant 01/09/2020    Generalized seizure ([facility]      Aphasia      Cerebral malignant neoplasm ([address] 01/02/2020    Brain neoplasm ([facility] 01/01/2020    Depression, major, recurrent, moderate (HCC) 10/08/2018    Postherpetic neuralgia 09/19/2018    Anxiety 07/20/2018    Esophageal disorder 07/20/2018    Pain in thoracic spine 07/15/2018    Epigastric abdominal pain 04/05/2018    Immunosuppressive management encounter following kidney transplant 04/02/2018    History of colonic polyps 09/26/2017    Chronic pain disorder 06/20/2017    Acid reflux 05/08/2017    Angina, class IV ([address] 05/08/2017    Mixed dyslipidemia 05/08/2017    Elevated blood uric acid level 03/21/2017    Gout 03/03/2015    Combined fat and carbohydrate induced hyperlipemia 02/23/2015    End stage kidney disease ([address] 10/27/2011    Essential hypertension 10/27/2011      Past Medical History        Past Medical History:   Diagnosis Date    Anxiety      Cardiomegaly      Chronic kidney disease      Colon polyps      Depression      Gallbladder polyp      GERD (gastroesophageal reflux disease)      Gout      Gout      Hypertension      Hyperuricemia      Hypothyroid      Vitamin D deficiency           Past Surgical History         Past Surgical History:   Procedure Laterality Date    CRANIOTOMY N/A 1/3/2020     CRANIOTOMY FOR EXCISION OF BRAIN TUMOR performed by Swetha Ramesh MD at [address] [place] [address]   03/03/2018    UPPER GASTROINTESTINAL ENDOSCOPY N/A 1/6/2020     EGD performed by Adebayo Bustamante MD at [facility]               Allergies   Allergen Reactions    Benadryl [Diphenhydramine]         Unknown       Chlorhexidine         unknown         DATE ONSET: 1/1/20     Date of Evaluation: 1/10/2020   Evaluating Therapist: Alfreda Mendez Epigastric abdominal pain 04/05/2018    Immunosuppressive management encounter following kidney transplant 04/02/2018    History of colonic polyps 09/26/2017    Chronic pain disorder 06/20/2017    Acid reflux 05/08/2017    Angina, class IV ([facility] 05/08/2017    Mixed dyslipidemia 05/08/2017    Elevated blood uric acid level 03/21/2017    Gout 03/03/2015    Combined fat and carbohydrate induced hyperlipemia 02/23/2015    End stage kidney disease ([facility] [facility] 10/27/2011    Essential hypertension 10/27/2011      Past Medical History        Past Medical History:   Diagnosis Date    Anxiety      Cardiomegaly      Chronic kidney disease      Colon polyps      Depression      Gallbladder polyp      GERD (gastroesophageal reflux disease)      Gout      Gout      Hypertension      Hyperuricemia      Hypothyroid      Vitamin D deficiency           Past Surgical History         Past Surgical History:   Procedure Laterality Date    CRANIOTOMY N/A 1/3/2020     CRANIOTOMY FOR EXCISION OF BRAIN TUMOR performed by Minna Padilla MD at [address] [place] [address]   03/03/2018    UPPER GASTROINTESTINAL ENDOSCOPY N/A 1/6/2020     EGD performed by Cassandra العلي MD at [address] [place] [address] Avenue: 1/1/20     Date of Evaluation: 1/10/2020   Evaluating Therapist: JOY Darling     Assessment:  Cognitive Diagnosis: Pt presents with mild cognitive linguistic impairment characterized by decreased auditory comprehension and decreased working memory.  Results taken from evaluation with use of  on 1/8/20   Diagnosis: mild cognitive impairment     Recommendations:  Requires SLP Intervention: Yes  Duration/Frequency of Treatment: 2-3x/week during LOS or until goals met     Goals:  Short-term Goals  Timeframe for Short-term Goals: 1-2 weeks  Goal 1: Pt will follow 2-3 step directions given orally with 85% accuracy with min cues to increase the pt's ability to follow reading  Visual History: No significant visual history  Patient Visual Report: No visual complaint reported. Visual Field Cut: No  Oculo Motor Control: Impaired  Impairments: Tracking  Cognition  Overall Cognitive Status: Exceptions  Arousal/Alertness: Delayed responses to stimuli  Following Commands: Follows one step commands with increased time  Attention Span: Appears intact  Memory: Appears intact  Safety Judgement: Good awareness of safety precautions  Problem Solving: Assistance required to implement solutions  Insights: Decreased awareness of deficits  Initiation: Requires cues for some  Sequencing: Requires cues for some  Cognition Comment: Comp 4, expression 5, social 5, problem 4, memory 4. Pt speaks [place] and [place]  Perception  Overall Perceptual Status: WFL  Sensation  Overall Sensation Status: WFL         LUE AROM (degrees)  LUE AROM : WFL  Left Hand AROM (degrees)  Left Hand AROM: WFL  RUE AROM (degrees)  RUE AROM : WFL  Right Hand AROM (degrees)  Right Hand AROM: WFL  LUE Strength  Gross LUE Strength: WFL  L Hand General: 4/5  RUE Strength  Gross RUE Strength: WFL  R Hand General: 4/5  Hand Dominance  Hand Dominance: Right      OT SLUMS unable to be performed secondary to language barrier at this time. Plan   Plan  Times per week: 5-7x/wk 1-1 1/2 weeks  Current Treatment Recommendations: Strengthening, Functional Mobility Training, Home Management Training, Equipment Evaluation, Education, & procurement, Endurance Training, Balance Training, Neuromuscular Re-education, Self-Care / ADL, Safety Education & Training     G-Code  OutComes Score                                                AM-PAC Score     Goals  Patient Goals   Patient goals : To return to home when ready      [x]? 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   [facility] admit 20. 2020    Cardiomegaly 2020    Hypothyroid 2020    Vitamin D deficiency 2020    BMI 27.0-27.9,adult 2020    Hx of kidney transplant 2020    Generalized seizure ([address]      Aphasia      Cerebral malignant neoplasm ([address] 2020    Brain neoplasm ([facility] 2020    Depression, major, recurrent, moderate (HCC) 10/08/2018    Postherpetic neuralgia 2018    Anxiety 2018    Esophageal disorder 2018    Pain in thoracic spine 07/15/2018    Epigastric abdominal pain 2018    Immunosuppressive management encounter following kidney transplant 2018    History of colonic polyps 2017    Chronic pain disorder 2017    Acid reflux 2017    Angina, class IV ([address] 2017    Mixed dyslipidemia 2017    Elevated blood uric acid level 2017    Gout 2015    Combined fat and carbohydrate induced hyperlipemia 2015    End stage kidney disease ([address] 10/27/2011    Essential hypertension 10/27/2011         Past Medical History   Past Medical History:   Diagnosis Date    Anxiety      Cardiomegaly      Chronic kidney disease      Colon polyps      Depression      Gallbladder polyp      GERD (gastroesophageal reflux disease)      Gout      Gout   Functional Limits  Follows Commands: Within Functional Limits  Observation/Palpation  Observation: No acute distress noted. Pt pleasant and motivated.      ROM:  RLE PROM: WFL  LLE PROM: WFL     Strength:  Strength RLE  Strength RLE: WNL  Strength LLE  Strength LLE: WNL     Neuro:  Sensation  Overall Sensation Status: WFL  Balance  Sitting - Static: Good  Sitting - Dynamic: Good  Standing - Static: Good  Standing - Dynamic: Fair  Comments: DGI = 12 (high falls risk). Pt retrieves object from floor with supervision. Pt completes DGI tasks with instruction for improved stability. Motor Control  Gross Motor?: (mild ataxia R LE)     Bed mobility  Bridging: Modified independent   Rolling to Left: Modified independent  Rolling to Right: Modified independent  Supine to Sit: Modified independent  Sit to Supine: Modified independent  Scooting: Modified independent     Transfers  Sit to Stand: Supervision  Stand to sit: Supervision  Bed to Chair: Supervision  Car Transfer: Supervision     Ambulation 1  Surface: level tile  Device: No Device  Assistance: Stand by assistance;Contact guard assistance  Quality of Gait: inconsistent path and pattern, especially involving R LE. Slow pacing with NBOS. Able to self correct however deviations increase in frequency with turns and multitask commands. Distance: 150ft     Stairs/Curb  Stairs?: Yes  Stairs  # Steps : 4  Stairs Height: 6\"  Rails: Right ascending  Assistance: Stand by assistance  Comment: Slow completion. Reciprocal pattern.        Activity Tolerance  Activity Tolerance: Patient Tolerated treatment well            Quality Indicators (IRF-BHARAT):  Rolling L and R: Independent - 6  Sit>Supine: Independent - 6  Supine>Sit:  Independent - 6  Sit>Stand: Supervision or Touching Assistance - 4  Chair/Bed>Chair Transfer: Supervision or Touching Assistance - 4  Car Transfers: Supervision or Touching Assistance - 4  Walk 10 ft: Supervision or Touching Assistance - 4  Walk 50 ft with two 90 degree turns: Supervision or Touching Assistance - 4  Walk 150 ft in [address]: Supervision or Touching Assistance - 4  Walking 10 ft on Unlevel Surface: Not attempted due to Environmental Limitations (i.e. weather, equipment unavailable) - 10  Picking up Objects from Standing Position: Supervision or Touching Assistance - 4  Stairs: Yes  WC Mobility: No Not Applicable (pt did not complete item prior to admission) - 9     ASSESSMENT:  Body structures, Functions, Activity limitations: Decreased functional mobility ; Decreased balance;Decreased coordination;Decreased vision/visual deficit  Decision Making: Medium Complexity  History: High  Exam: High  Clinical Presentation: Med     Prognosis: Good  PT Education: Goals;PT Role;Plan of Care;General Safety  Barriers to Learning: language barrier        CLINICAL IMPRESSION: Pt presents with impaired coordination and gaze stabilization which has negatively impacted her functional mobility and increases her risk of falls.  Continued PT indicated to progress mobility and facilitate DC at highest level of indep and safety.      PLAN OF CARE:  Frequency: 1-2 treatment sessions per day, 5-7 days per week  Current Treatment Recommendations: Strengthening, Functional Mobility Training, Neuromuscular Re-education, Home Exercise Program, Equipment Evaluation, Education, & procurement, Transfer Training, Safety Education & Training, Balance Training, Endurance Training, Stair training, Patient/Caregiver Education & Training, Cognitive/Perceptual Training, Gait Training     Patient's Goal:  Get back home     GOALS:  Short term goals  Short term goal 1: Pt to complete HEP with indep  Short term goal 2: Pt to achieve 19/24 DGI to demonstrate improved balance and decreased falls risk  Long term goals  Long term goal 1: Pt to complete bed mobility indep throughout program  Long term goal 2: Pt to complete all transfers with indep  Long term goal 3: Pt to ambulate 300ft+ without AD indep on level and unlevel surfaces  Long term goal 4: Pt to manage 12 steps with HR and indep     ELOS:   Plan weeks: 1 wk     Pamela Francois, PT, 01/10/20 at 10:29 AM  ==============================================================  Debra Chaudhari   [facility]  Speech Language Pathology  Insurance Progress Note           Ilia Coahoma  1945  [id number]        Date: 1/15/2020        SPEECH THERAPY     CURRENT GOALS:  Short-term Goals  Timeframe for Short-term Goals: 1-2 weeks  Goal 1: Pt will follow 2-3 step directions given orally with 85% accuracy with min cues to increase the pt's ability to follow directions provided by caregivers for safe follow through with ADLs. Goal 2: Pt will answer mid-high level Wh- questions with 85% accuracy with min cues to assist the caregiver in obtaining important information regarding the patient's personal, medical, and safety needs. Goal 3: To address pt's cognitive deficits and promote recall of personal and medical information, pt will answer questions addressing (remote, recent, delayed) recall with 80% accuracy and stand by cues. Goal 4: Pt will complete RIPA in 1-5 days to guide POC and treatment with use of  services  Long-term Goals  Timeframe for Long-term Goals: 1-2 weeks  Goal 1: Pt will improve her Receptive Language abilities to a modified independent level for comprehension of conversation and safety directions with familiar and unfamiliar communication partners. Goal 2: Pt will improve her Cognition from min assist to supervision for adequate functional recall and safety awareness for home. Jean Paul Law Short-term Goals  Goal 1: N/A  Compensatory Swallowing Strategies: Upright as possible for all oral intake      UPDATED GOALS:  Goal 1: Pt will follow 2-3 step directions given orally with 85% accuracy with min cues to increase the pt's ability to follow directions provided by caregivers for safe follow through with ADLs.       Goal safe  Recommending a safety alert call system for @ home.     Electronically signed by:    SHANE De La Torre  2020, 10:54 AM   -----------------------------------------------------------------------------------------------  In-Patient Rehabilitation Insurance Update Note:   Physical Therapy  [id number]        NAME: Jl Bass  : 1945  MRN: [id number]     Current Level of Function:  Bed mobility  Bridging: Independent  Rolling to Left: Independent  Rolling to Right: Independent  Supine to Sit: Independent  Sit to Supine: Independent  Scooting: Independent     Transfers  Sit to Stand: Independent  Stand to sit:  Independent  Bed to Chair: Independent  Car Transfer: Independent     Ambulation  Ambulation?: Yes  More Ambulation?: No  Ambulation 1  Surface: level tile;carpet  Device: No Device  Assistance: Modified Independent  Quality of Gait: Occasional deviation from straightline path, reciprocal pattern   Gait Deviations: Decreased arm swing  Distance: 300' x 2  Stairs/Curb  Stairs?: Yes   Stairs  # Steps : 12  Stairs Height: 6\"  Rails: None  Curbs: 6\"  Device: No Device  Assistance: Modified independent   Comment: Reciprocal pattern good safety     Assessment:  Met all goals     Updated Goals:  Short term goals  Short term goal 1: Pt to complete HEP with indep  Short term goal 2: Pt to achieve  DGI to demonstrate improved balance and decreased falls risk  Long term goals  Long term goal 1: Pt to complete bed mobility indep throughout program  Long term goal 2: Pt to complete all transfers with indep  Long term goal 3: Pt to ambulate 300ft+ without AD indep on level and unlevel surfaces  Long term goal 4: Pt to manage 12 steps with HR and indep      Toney Griffith, PT, 20 at 12:33 PM  ======================================================  Electronically signed by Krystal Higgins RN on 20 at 2:11 PM

## 2020-01-16 NOTE — PROGRESS NOTES
[facility]   Facility/Department: [facility]  Speech Language Pathology  Discharge Report        Patient: Paty Celaya  : 1945    Date: 2020    NATIONAL OUTCOMES MEASUREMENT SYSTEM (NOMS):    STATUS AT INITIATION OF THERAPY:Pt presents with mild cognitive linguistic impairment characterized by decreased auditory comprehension and decreased working memory. Results taken from evaluation with use of  on 20   DIET: Regular/thin      FIM:  Comprehension          Expression   []7 - Independent   []7 - Indpendent   []6 - Modified Independent  [x]6 - Modified Independent   [x]5 - Supervision   []5 - Supervision   []4 - Min Assist   []4 - Min Assist   []3 - Mod Assist   []3 - Mod Assist   []2 - Max Assist   []2 - Max Assist   []1 - Dependent   []1 - Dependent    Problem Solving        Memory   []7 - Independent   []7 - Independent   []6 - Modified Independent  []6 - Modified Independent   [x]5 - Supervision   []5 - Supervision   []4 - Min Assist   [x]4 - Min Assist   []3 - Mod Assist   []3 - Mod Assist   []2 - Max Assist   []2 - Max Assist   []1 - Dependent   [] 1 -Dependent          Treatment Area(s):  Cognition    Progress made:  Pt participated in 2 treatment sessions since initial evaluation on rehab. Goals were made in the area of memory and word finding. Short-term Goals  Timeframe for Short-term Goals: 1-2 weeks  Goal 1: Pt will follow 2-3 step directions given orally with 85% accuracy with min cues to increase the pt's ability to follow directions provided by caregivers for safe follow through with ADLs. Goal 2: To increase safety awareness and judgment for safe completion of ADLs secondary to pt's cognitive deficits, pt will sequence common activities of daily living with (verbal/written) steps with 80% accuracy and mild cues. Goal 3:  To address pt's cognitive deficits and promote recall of personal and medical information, pt will answer questions addressing (remote, recent, delayed) recall with 80% accuracy and stand by cues. Goal 4: Pt will be educated on word-finding strategies, and use them in structured and unstructured tasks in 90% of given opportunities with mild verbal cues to help the pt express his/her personal, safety, and medical needs in the presence of language deficits. Goal 5: To increase safety awareness and judgment for safe completion of ADLs secondary to pt's cognitive deficits, pt will complete abstract reasoning tasks (i.e. Word deduction, convergent and divergent naming, similarities/differences) with 80% accuracy and min cues. Long-term Goals  Timeframe for Long-term Goals: 1-2 weeks  Goal 1: Pt will improve her language abilities to a modified independent level for comprehension of conversation and safety directions with familiar and unfamiliar communication partners and to improve the ability to express complex personal and medical information. Goal 2: Pt will improve her Cognition from min assist to supervision for adequate functional recall and safety awareness for home. Rishabh Cotter Short-term Goals  Goal 1: N/A  Compensatory Swallowing Strategies: Upright as possible for all oral intake     STATUS AT DISCHARGE:  DIET: Regular/thin      SPOKEN LANGUAGE COMPREHENSION  Ratin    SPOKEN LANGUAGE EXPRESSION  Ratin-5    MOTOR SPEECH  Ratin    PROBLEM SOLVING  Ratin    MEMORY  Ratin -5    Functional Status at time of Discharge:    · Cognition: Patient demonstrates minimal cognitive deficits. · Communication: Patient demonstrates no communication deficits. · Language: Patient demonstrates minimal language deficits. · Motor Speech: Patient demonstrates no motor speech deficits. · Swallow: Patient demonstrates no dysphagia.                                  Patient is discharged to Home               [] Recommend continued speech therapy   [x] Speech Therapy is no longer warranted      Signature:  Zabrina Zurita CCC-SLP, Date: 1/16/2020, Time: 3:53 PM

## 2020-01-16 NOTE — PROGRESS NOTES
Subjective: The patient complains of severe  acute on chronic progressive balance deficits partially relieved by recent craniotomy with anterior tumor resection in the temporal area, PT, OT and exacerbated by poor sleep. I am concerned about patients complaints of chest pain in the a.m. of 1/11/2020 EKG is unremarkable troponins are pending. Her balance and endurance continue to improve. I will start her to transition to independent in the room while here and we will modify her medications to them more manageable regimen at home. I will start patient and family education regarding her medications. ROS x10: The patient also complains of severely impaired mobility and activities of daily living. Otherwise no new problems with vision, hearing, nose, mouth, throat, dermal, cardiovascular, GI, , pulmonary, musculoskeletal, psychiatric or neurological. See Rehab H&P on Rehab chart dated . Vital signs:  BP (!) 140/77   Pulse 75   Temp 97 °F (36.1 °C) (Oral)   Resp 17   Ht 5' 1\" (1.549 m)   Wt 138 lb 3.7 oz (62.7 kg)   SpO2 97%   BMI 26.12 kg/m²   I/O:   PO/Intake:  fair PO intake, no problems observed or reported. Bowel/Bladder:  Continent, severe opiate related constipation  General:  Patient is well developed, adequately nourished, non-obese and     well kempt. HEENT:    PERRLA, hearing intact to loud voice, external inspection of ear     and nose benign. Inspection of lips, tongue and gums benign  Musculoskeletal: No significant change in strength or tone. All joints stable. Inspection and palpation of digits and nails show no clubbing,       cyanosis or inflammatory conditions. Neuro/Psychiatric: Affect: flat but pleasant. Alert and oriented to person, place and     situation. No significant change in deep tendon reflexes or     sensation  Lungs:  CTA-B. Respiration effort is normal at rest.     Heart:   S1 = S2, RRR. No loud murmurs.     Abdomen:  Soft, non-tender, no infiltrate/pneumonia and/or atelectasis. [place] 4. Vascular calcifications thoracic aorta     Mri Brain W Wo 1/1/2020  1. Enhancing mass/malignancy in the left middle cranial fossa/left anterior temporal lobe, findings suspicious for meningioma although glioblastoma is not excluded. There is extensive edema extending throughout the left frontal lobe, left parietal lobe and left basal ganglia/temporal lobe, without evidence of restricted diffusion, greater than expected. Findings could reflect extensive edema from the mass/malignancy. Other etiologies are not excluded. . Neurosurgical/neurological evaluation recommended. 2. Left to right subfalcine herniation of 0.56 cm. Neurosurgical consultation and evaluation is recommended. 3. Dominant left vertebral artery        Previous extensive, complex labs, notes and diagnostics reviewed and analyzed. ALLERGIES:    Allergies as of 01/09/2020 - Review Complete 01/09/2020   Allergen Reaction Noted    Benadryl [diphenhydramine]  05/08/2017    Chlorhexidine  05/08/2017      (please also verify by checking MAR)      I reviewed her [facility] prescription monitoring service data sheets in hopes of eliminating polypharmacy and weaning to the lowest effective dose of pain medications and eliminating the concomitant use of benzodiazepines. I see no medications of concern. I see no habits of combining sedatives and narcotics. Complex Physical Medicine & Rehab Issues Assess & Plan:   1. Severe abnormality of gait and mobility and impaired self-care and ADL's secondary to progressive balance deficits status post left temporal intracranial tumor. Functional and medical status reassessed regarding patients ability to participate in therapies and patient found to be able to participate in acute intensive comprehensive inpatient rehabilitation program including PT/OT to improve balance, ambulation, ADLs, and to improve the P/AROM.   Therapeutic modifications regarding activities in therapies, place, amount of time per day and intensity of therapy made daily. In bed therapies or bedside therapies prn.   2. Bowel severe opiate related constipation and Bladder dysfunction:  frequent toileting, ambulate to bathroom with assistance, check post void residuals. Check for C.difficile x1 if >2 loose stools in 24 hours, continue bowel & bladder program.  Monitor bowel and bladder function. Lactinex 2 PO every AC. MOM prn, Brown Bomb prn, Glycerin suppository prn, enema prn. Add lactulose daily. 3. Severe osteoarthritis and neck pain as well as generalized OA pain: reassess pain every shift and prior to and after each therapy session, give prn Tylenol and add Norco as needed, she will wean off of the Ultram due to seizure risk modalities prn in therapy, Lidoderm, K-pad prn. She may benefit from something on Norco at home and would likely benefit from palliative care at discharge. I will add palliative care consult. 4. Skin healing left craniotomy incision and breakdown risk:  continue pressure relief program.  Daily skin exams and reports from nursing. 5. Severe fatigue due to nutritional and hydration deficiency: Add vitamin B12 vitamin D and CoQ10 continue to monitor I&Os, calorie counts prn, dietary consult prn.  6. Acute episodic insomnia with situational adjustment disorder:  prn Ambien, monitor for day time sedation. 7. Falls risk elevated:  patient to use call light to get nursing assistance to get up, bed and chair alarm. 8. Elevated DVT risk: progressive activities in PT, continue prophylaxis SREEDHAR hose, elevation and Lovenox discontinued due to increased GERD. 9. Complex discharge planning: Focus on balance and medical stability as well as healing plans discharge January 17, 2020 home with outpatient services.   Status post weekly team meeting  Monday to assess progress towards goals, discuss and address social, psychological and medical comorbidities and to address difficulties they may be having progressing in therapy. Patient and family education is in progress. The patient is to follow-up with their family physician after discharge. Complex Active General Medical Issues that complicate care Assess & Plan:    1. End stage kidney disease,   Hx of kidney transplant-limit toxic medications recheck BMP and CBC titrate Prograf and CellCept titrate prednisone for the swelling at the tumor site. Patient is not on prednisone for the kidney transplant. Consult Dr. Celeste Spears. 2.   Essential hypertension, occasional angina mixed dyslipidemia-vital signs every shift dose and titrate cardiac medications to include Norvasc Lopressor holding blood thinners postoperatively consult hospitalist for backup medical check troponins EKG was unremarkable 1/11/2020  3. Gout Elevated blood uric acid level-low purine diet when possible  4. Cerebral malignant neoplasm -status post resection follow-up with Dr. Pete Vieira as an outpatient  5. Generalized seizure -add Keppra titrate Decadron for postop swelling  6. Postherpetic neuralgia-titrate pain medications using lowest effective dose  7. Epigastric abdominal pain-abdominal binder Lidoderm  8. Immunosuppressive management encounter following kidney transplant-vitamin B12 vitamin D co-Q10 strict handwashing  9. Pain in thoracic spine and generalized neuropathic pain-titrate Cymbalta, titrate Ultram and Tylenol and increased mattress  10. Vitamin D deficiency-bolus dose vitamin D  11. Active severe GERD-titrate Protonix monitor stools for blood discontinue Lovenox. Continue to titrate Carafate add Tums.   12. Oral pharyngeal dysphasia-consult speech and language pathology add dental soft diet thin liquids           Marylou Patterson D.O., PM&R     Attending    [address]

## 2020-01-16 NOTE — PROGRESS NOTES
Independent  Additional Comments: standing in shower and @ sink for groomimg  Toilet Transfers  Toilet - Technique: Ambulating  Equipment Used: Grab bars  Toilet Transfer: Independent  Toilet Transfers Comments: 0 device, X 2  Tub Transfers  Tub Transfers: Not tested  Shower Transfers  Shower - Transfer Type: To and From  Shower - Transfer To: Shower seat with back  Shower - Technique: Ambulating  Shower Transfers: Independent  Shower Transfers Comments: no shower chair or grab bars used. Pt reported has shower stall with a seat @ home    Orientation Status:  Orientation  Overall Orientation Status: Within Functional Limits    Cognition Status:  Cognition  Overall Cognitive Status: Exceptions  Arousal/Alertness: Appropriate responses to stimuli  Following Commands:  Follows multistep commands with increased time  Attention Span: Appears intact  Memory: Decreased short term memory(Pt reported her memory of English has decreased since sx)  Safety Judgement: Good awareness of safety precautions  Problem Solving: Assistance required to implement solutions, Assistance required to correct errors made  Insights: Decreased awareness of deficits  Initiation: Does not require cues  Sequencing: Does not require cues  Cognition Comment: Comp-Mod I[facility] Express-Min A(language barrier),  Social interaction- Ind,  Problem solving-supervision, Memory-  Supervision  Perception Status:  Perception  Overall Perceptual Status: WFL    Sensation Status:  Sensation  Overall Sensation Status: WFL    Vision and Hearing Status:  Vision  Vision: Impaired  Vision Exceptions: Wears glasses for reading  Hearing  Hearing: Within functional limits     UE Function Status:    ROM:   LUE AROM (degrees)  LUE AROM : WFL  Left Hand AROM (degrees)  Left Hand AROM: WFL  RUE AROM (degrees)  RUE AROM : WFL  Right Hand AROM (degrees)  Right Hand AROM: WFL    Strength:  LUE Strength  Gross LUE Strength: WFL  L Hand General: 4/5  RUE Strength  Gross

## 2020-01-16 NOTE — PROGRESS NOTES
Occupational Therapy  Facility/Department: [facility]  Daily Treatment Note  NAME: Jeremiah Hameed  : 1945  MRN: [id number]    Date of Service: 2020    Discharge Recommendations:  Home with assist PRN       Assessment      Patient Education: Safety  REQUIRES OT FOLLOW UP: No  Activity Tolerance  Activity Tolerance: Patient Tolerated treatment well  Safety Devices  Safety Devices in place: Not Applicable(Pt Ind In room)         Patient Diagnosis(es): The primary encounter diagnosis was Cerebral malignant neoplasm ([facility] [address] 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 [facility] Re[facility] admit 20., Brain neoplasm [facility]), and Anxiety were also pertinent to this visit. has a past medical history of Anxiety, Cardiomegaly, Chronic kidney disease, Colon polyps, Depression, Gallbladder polyp, GERD (gastroesophageal reflux disease), Gout, Gout, Hypertension, Hyperuricemia, Hypothyroid, and Vitamin D deficiency. has a past surgical history that includes Dialysis fistula creation (Left); Kidney transplant (2018); craniotomy (N/A, 1/3/2020); and Upper gastrointestinal endoscopy (N/A, 2020).     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

## 2020-01-16 NOTE — PROGRESS NOTES
Physical Therapy Rehab Treatment Note  [facility]  [facility] [place]       NAME: Ford Green  : 1945 (69 y.o.)  MRN: [id number]  CODE STATUS: Full Code    Date of Service: 2020  Chart Reviewed: Yes    Restrictions:  Restrictions/Precautions: Fall Risk, Seizure       SUBJECTIVE: Subjective: I am tired  Pain Screening  Patient Currently in Pain: No  Pre Treatment Pain Screening  Pain at present: 0  Scale Used: Numeric Score  Intervention List: Patient able to continue with treatment    Post Treatment Pain Screening:  Pain Assessment  Pain Assessment: 0-10  Pain Level: 0    OBJECTIVE:     Transfers  Sit to Stand: Independent  Stand to sit: Independent    Ambulation  Ambulation?: Yes  More Ambulation?: No  Ambulation 1  Surface: level tile;carpet  Device: No Device  Assistance: Modified Independent  Quality of Gait: Occasional deviation from straightline path, reciprocal pattern   Gait Deviations: decreased arm swing  Distance: 300' x 1    Exercises  Hip Flexion: x 20  Hip Abduction:  x 20  Knee Long Arc Quad: x 20  Ankle Pumps: x 20  Comments: Seated and standing HEP given and reviewed. Patient shows a good understanding of all exercises   Other exercises  Other exercises?: Yes  Other exercises 1: Standing Hip abduction x 20  Other exercises 2: Standing marching x 20  Other exercises 3: Standing hip extension x 20  Other exercises 4: Standing knee flexion x 20  Other exercises 5: Standing heel raises x 20  Other exercises 6: Mini squats x 20     ASSESSMENT/COMMENTS:  Body structures, Functions, Activity limitations: Decreased functional mobility ; Decreased balance;Decreased coordination;Decreased vision/visual deficit  Assessment: Patient has met goal for Indepndence with HEP    PLAN OF CARE/Safety:   Safety Devices  Type of devices:  All fall risk precautions in place      Therapy Time:   Individual   Time In 1500   Time Out 1530   Minutes 30     Minutes: 30      Gait training: 10     Therapeutic ex: Ten Morris7, PTA, 01/16/20 at 3:34 PM

## 2020-01-16 NOTE — PROGRESS NOTES
then utilized strategy requiring max cues and verbal prompting. Goal 5: To increase safety awareness and judgment for safe completion of ADLs secondary to pt's cognitive deficits, pt will complete abstract reasoning tasks (i.e. Word deduction, convergent and divergent naming, similarities/differences) with 80% accuracy and min cues. Not addressed. Treatment/Activity Tolerance:  Patient tolerated treatment well    Plan:  Continue per POC    Pain:  Patient demonstrated no s/s of pain. - no pain     Patient/Caregiver Education:  Patient educated on session and progression towards goals. Caregiver education on session and progress towards goals. Caregiver stated verbal understanding of directions. Safety Devices: All fall risk precautions in place      [address] ([facility]):    SPOKEN LANGUAGE COMPREHENSION  Ratin     SPOKEN LANGUAGE EXPRESSION  Ratin-5     MOTOR SPEECH  Ratin     PROBLEM SOLVING  Ratin     MEMORY  Ratin-5           Therapy Time   Time in:1142  Time out:1200  Total minutes: 18 minutes     Session started late due to technical difficulties with translation system.                    Signature: Electronically signed by JOY Ortiz on 2020 at 9:49 AM

## 2020-01-16 NOTE — PLAN OF CARE
Problem: Coping:  Goal: Ability to cope will improve  Description  Ability to cope will improve  Outcome: Ongoing     Problem: Health Behavior:  Goal: Identification of resources available to assist in meeting health care needs will improve  Description  Identification of resources available to assist in meeting health care needs will improve  Outcome: Ongoing     Problem: Physical Regulation:  Goal: Complications related to the disease process, condition or treatment will be avoided or minimized  Description  Complications related to the disease process, condition or treatment will be avoided or minimized  Outcome: Ongoing     Problem: Role Relationship:  Goal: Ability to communicate needs accurately will improve - ADL needs  Description  Ability to communicate needs accurately will improve  Outcome: Ongoing     Problem: Skin Integrity:  Goal: Demonstration of wound healing without infection will improve  Description  Demonstration of wound healing without infection will improve  Outcome: Ongoing     Problem: Falls - Risk of:  Goal: Will remain free from falls  Description  Will remain free from falls  Outcome: Ongoing  Goal: Absence of physical injury  Description  Absence of physical injury  Outcome: Ongoing     Problem: IP BALANCE  Goal: LTG - Patient will maintain balance to allow for safe/functional mobility  Outcome: Ongoing     Problem: IP COMMUNICATION/DYSARTHRIA  Goal: LTG - patient will improve expressive language skills to allow for communication of wants and needs in daily activities  Outcome: Ongoing     Problem: IP SWALLOWING  Goal: LTG - patient will tolerate the least restrictive diet consistency to allow for safe consumption of daily meals  Outcome: Ongoing     Problem: Pain:  Goal: Pain level will decrease  Description  Pain level will decrease  Outcome: Ongoing  Goal: Control of acute pain  Description  Control of acute pain  Outcome: Ongoing  Goal: Control of chronic pain  Description  Control of chronic pain  Outcome: Ongoing

## 2020-01-16 NOTE — PROGRESS NOTES
Pt denied any pain, SOB or N/V. LBM 1/15/20. Pt walks with no device, IND in room. Surgical incision to left craniotomy, DOLORES with slight bruising. LFA swelling/deformity secondary to LFA AV fistula present. Continent B/B. No adverse reactions noted to PO Bactrim. Pt sleeping well. Will continue to monitor.  Electronically signed by Jamal Gardner RN on 1/16/2020 at 6:52 AM

## 2020-01-16 NOTE — PROGRESS NOTES
Physical Therapy Rehab Treatment Note  Facility/Department: [facility]  Room: R242/R242-01       NAME: Ana Paula Fierro  : 1945 (69 y.o.)  MRN: [id number]  CODE STATUS: Full Code    Date of Service: 2020  Chart Reviewed: Yes    Restrictions:  Restrictions/Precautions: Fall Risk, Seizure       SUBJECTIVE: Subjective: I have no pain  Pain Screening  Patient Currently in Pain: No  Pre Treatment Pain Screening  Pain at present: 0  Scale Used: Numeric Score  Intervention List: Patient able to continue with treatment    Post Treatment Pain Screening:  Pain Assessment  Pain Assessment: 0-10  Pain Level: 0    OBJECTIVE:     Outcomes Measures:  Lopez Balance Score: 52  Dynamic Gait Total Score: 19     Bed mobility  Bridging: Independent  Rolling to Left: Independent  Rolling to Right: Independent  Supine to Sit: Independent  Sit to Supine: Independent  Scooting: Independent    Transfers  Sit to Stand: Independent  Stand to sit: Independent  Bed to Chair: Independent  Car Transfer: Independent    Ambulation  Ambulation?: Yes  More Ambulation?: No  Ambulation 1  Surface: level tile;carpet  Device: No Device  Assistance: Modified Independent  Quality of Gait: Occasional deviation from straightline path, reciprocal pattern   Gait Deviations: Decreased arm swing  Distance: 300' x 2    Stairs/Curb  Stairs?: Yes  Stairs  # Steps : 12  Stairs Height: 6\"  Rails: None  Curbs: 6\"  Device: No Device  Assistance: Modified independent   Comment: Reciprocal pattern good safety    ASSESSMENT/COMMENTS:  Body structures, Functions, Activity limitations: Decreased functional mobility ; Decreased balance;Decreased coordination;Decreased vision/visual deficit  Assessment: Patient is meeting all goals at this time. Patient shows good safety and technique with transfers and gait    PLAN OF CARE/Safety:   Safety Devices  Type of devices:  All fall risk precautions in place      Therapy Time:   Individual   Time In 1030   Time Out 1100 Minutes 30     Minutes:30      Transfer/Bed mobility trainin      Gait training: 10      Neuro re education: [place], [place], 20 at 1:54 PM

## 2020-01-16 NOTE — CARE COORDINATION
Spoke with patient and daughter Conor Hatch to finalize discharge plans for tomorrow. Daughter will be in around noon and still requesting [id number] [facility] outpatient for mother. Discussed options for [employer] system while daughter is at work in case of emergency and setting up a phone for her. Information printed out and left in room to discuss further with daughter tomorrow. No further questions at this time.  Electronically signed by Farhad Dougherty RN on 1/16/20 at 2:18 PM

## 2020-01-16 NOTE — CONSULTS
Palliative Care Consult Note  Patient: Jl Bass  Gender: female  YOB: 1945  Unit/Bed: C539/D107-65  CodeStatus: Full Code  Inpatient Treatment Team: Treatment Team: Attending Provider: Dave Espana DO; Consulting Physician: Jace Bales MD; Consulting Physician: Krishna Maurer MD; Consulting Physician: Meghann Benoit DO; Hospitalist: Samir Delgado MD; Consulting Physician: Darrall Ormond, MD; LPN: Angela Wilhelm LPN; Patient Care Tech: Asher Purcell; Registered Nurse: Graeme Lin RN  Admit Date:  1/9/2020    Chief Complaint:   -weakness  -nausea/vomiting  -pain    History of Presenting Illness:      Jl Bass is a 69 y.o. female on hospital day 15 with a history of kidney transplant 2018, HTN, GERD, HTN,Gout, left cerebral tumor. Patient was admitted on 1-1-2020 to [facility] [place] [facility] with diagnosis of altered mental status. Patient had consult with neurology and craniotomy done of left cerebral tumor on 1-3-2020. Patinet was treated with Decadron and Keppra. Patient presented today in NAD. Her native language is [place]. Daughter at bedside to translate. Patient presented today alert and orientedx3. Patient denies any headache, blurred vision, seizures, difficulty swallowing, numbness or any N/V or dizziness. Patient is able to ambulate without assist.Surgical wound healing well. Patient reports good appetite. Review of Systems:       Review of Systems   Constitutional: Negative for chills, fatigue, fever and unexpected weight change. HENT: Negative for congestion, mouth sores, sinus pain, sore throat and trouble swallowing. Respiratory: Negative for cough, chest tightness, shortness of breath and wheezing. Cardiovascular: Negative for chest pain, palpitations and leg swelling. Gastrointestinal: Negative for abdominal pain, constipation, diarrhea, nausea and vomiting. Endocrine: Negative for polydipsia, polyphagia and polyuria.    Genitourinary: Negative for dysuria, flank pain, frequency and urgency. Musculoskeletal: Negative for arthralgias, back pain, gait problem, joint swelling and myalgias. Skin: Negative. Neurological: Negative for tremors, seizures, speech difficulty, weakness, numbness and headaches. Psychiatric/Behavioral: Negative for agitation, confusion, sleep disturbance and suicidal ideas. The patient is not nervous/anxious. Physical Examination:       /71   Pulse 72   Temp 98 °F (36.7 °C) (Oral)   Resp 18   Ht 5' 1\" (1.549 m)   Wt 138 lb 3.7 oz (62.7 kg)   SpO2 96%   BMI 26.12 kg/m²    Physical Exam  Constitutional:       Appearance: She is well-developed. HENT:      Head: Normocephalic and atraumatic. Eyes:      Pupils: Pupils are equal, round, and reactive to light. Neck:      Musculoskeletal: Normal range of motion and neck supple. Cardiovascular:      Rate and Rhythm: Normal rate and regular rhythm. Heart sounds: No murmur. No friction rub. No gallop. Pulmonary:      Effort: Pulmonary effort is normal.      Breath sounds: Normal breath sounds. No wheezing or rales. Chest:      Chest wall: No tenderness. Abdominal:      General: Bowel sounds are normal. There is no distension. Palpations: Abdomen is soft. Tenderness: There is no tenderness. There is no guarding. Musculoskeletal: Normal range of motion. General: No tenderness or deformity. Skin:     General: Skin is warm and dry. Findings: No erythema or rash. Neurological:      Mental Status: She is alert and oriented to person, place, and time. Allergies:       Allergies   Allergen Reactions    Benadryl [Diphenhydramine]      Unknown      Chlorhexidine      unknown       Medications:      Current Facility-Administered Medications   Medication Dose Route Frequency Provider Last Rate Last Dose    pantoprazole (PROTONIX) tablet 20 mg  20 mg Oral BID AC Ivana Scullin, DO   20 mg at 01/15/20 1802    calcium carbonate (TUMS) chewable tablet 500 mg  500 mg Oral BID Ivana Scullin, DO   500 mg at 01/15/20 1039    sucralfate (CARAFATE) 1 GM/10ML suspension 1 g  1 g Oral TID AC Ivana Scullin, DO   1 g at 01/15/20 1802    magnesium hydroxide (MILK OF MAGNESIA) 400 MG/5ML suspension 30 mL  30 mL Oral Daily PRN Dorcus Onalaska, APRN - CNP   30 mL at 01/11/20 2033    amLODIPine (NORVASC) tablet 5 mg  5 mg Oral Daily Dorcus [place], APRN - CNP   5 mg at 01/15/20 1039    cinacalcet (SENSIPAR) tablet 30 mg  30 mg Oral Daily Dorcus [place], APRN - CNP   30 mg at 01/15/20 1200    DULoxetine (CYMBALTA) extended release capsule 30 mg  30 mg Oral Daily Dorcus [place], APRN - CNP   30 mg at 01/15/20 1038    levETIRAcetam (KEPPRA) tablet 750 mg  750 mg Oral BID Dorcus [place], APRN - CNP   750 mg at 01/15/20 1038    metoprolol tartrate (LOPRESSOR) tablet 25 mg  25 mg Oral BID Dorcus [place], APRN - CNP   25 mg at 01/15/20 1038    mycophenolate (CELLCEPT) capsule 500 mg  500 mg Oral BID Dorcus [place], APRN - CNP   500 mg at 01/15/20 1036    predniSONE (DELTASONE) tablet 5 mg  5 mg Oral Daily Dorcus [place], APRN - CNP   5 mg at 01/15/20 1038    sulfamethoxazole-trimethoprim (BACTRIM;SEPTRA) 400-80 MG per tablet 1 tablet  1 tablet Oral Daily Dorcus [place], APRN - CNP   1 tablet at 01/15/20 1200    tacrolimus (PROGRAF) capsule 2 mg  2 mg Oral QPM Dorcus [place], APRN - CNP   2 mg at 01/14/20 2012    tacrolimus (PROGRAF) capsule 3 mg  3 mg Oral QAM Dorcus [place], APRN - CNP   3 mg at 01/15/20 1037    traMADol (ULTRAM) tablet 50 mg  50 mg Oral Q6H PRN Dorcus [place], APRN - CNP   50 mg at 01/13/20 2114    acetaminophen (TYLENOL) tablet 650 mg  650 mg Oral Q4H PRN Joseph Sane, DO        fleet rectal enema 1 enema  1 enema Rectal Daily PRN Ivana Franciscoullin, DO        lactulose (CHRONULAC) 10 GM/15ML solution 20 g  20 g Oral Daily PRN Joseph Sane, DO           History:       PMHx:  Past Medical History:   Diagnosis Date    Anxiety     Cardiomegaly     Chronic kidney disease     Colon polyps     Depression     Gallbladder polyp     GERD (gastroesophageal reflux disease)     Gout     Gout     Hypertension     Hyperuricemia     Hypothyroid     Vitamin D deficiency        PSHx:  Past Surgical History:   Procedure Laterality Date    CRANIOTOMY N/A 1/3/2020    CRANIOTOMY FOR EXCISION OF BRAIN TUMOR performed by Lucita Goncalves MD at [facility] Left     KIDNEY TRANSPLANT  03/03/2018    UPPER GASTROINTESTINAL ENDOSCOPY N/A 1/6/2020    EGD performed by Tresa Bacon MD at [facility] Hx:  Social History     Socioeconomic History    Marital status:      Spouse name: None    Number of children: None    Years of education: None    Highest education level: None   Occupational History    Occupation: Homemaker    Occupation: Caregiver to the elderly   Social Needs    Financial resource strain: Not very hard    Food insecurity:     Worry: Never true     Inability: Never true    Transportation needs:     Medical: No     Non-medical: No   Tobacco Use    Smoking status: Never Smoker    Smokeless tobacco: Never Used   Substance and Sexual Activity    Alcohol use: Never     Frequency: Never    Drug use: Never    Sexual activity: Not Currently     Partners: Male   Lifestyle    Physical activity:     Days per week: 0 days     Minutes per session: 0 min    Stress: Only a little   Relationships    Social connections:     Talks on phone: More than three times a week     Gets together: More than three times a week     Attends Restorationism service: More than 4 times per year     Active member of club or organization: No     Attends meetings of clubs or organizations: Never     Relationship status:      Intimate partner violence:     Fear of current or ex partner: No     Emotionally abused: No     Physically abused: No     Forced sexual activity: No   Other Topics Concern    None Social History Narrative         Lives With: 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 She is a [address], [place]. 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 Type of Home: House Multi-level, Bed/Bath upstairs    Home Access: Stairs to enter with rails    Entrance Stairs - Number of Steps: 2    Bathroom Shower/Tub: Tub/Shower unit    Bathroom Equipment: Shower chair    Home Equipment: Rolling walker    ADL Assistance: Independent    Homemaking Assistance: Independent    Homemaking Responsibilities: (Pt reports that she was independent)    Ambulation Assistance: Independent    Transfer Assistance: Independent    Active : No    Additional Comments: has 2 dtrs that stop in daily    Social/Functional History            Family Hx:  History reviewed. No pertinent family history.     LABS: Reviewed     CBC:  Lab Results   Component Value Date    WBC 3.8 01/09/2020    RBC 3.58 01/09/2020    HGB 10.7 01/09/2020    HCT 32.8 01/09/2020    MCV 91.5 01/09/2020    MCH 30.0 01/09/2020    MCHC 32.8 01/09/2020    RDW 13.8 01/09/2020     01/09/2020    MPV 8.8 06/26/2014     CBC with Differential:   Lab Results   Component Value Date    WBC 3.8 01/09/2020    RBC 3.58 01/09/2020    HGB 10.7 01/09/2020    HCT 32.8 01/09/2020     01/09/2020    MCV 91.5 01/09/2020    MCH 30.0 01/09/2020    MCHC 32.8 01/09/2020    RDW 13.8 01/09/2020    NRBC 1 01/01/2020    BANDSPCT 3 01/04/2020    METASPCT 1 01/07/2020    LYMPHOPCT 8.4 01/09/2020    MONOPCT 10.4 01/09/2020    BASOPCT 0.2 01/09/2020    MONOSABS 0.4 01/09/2020    LYMPHSABS 0.3 01/09/2020    EOSABS 0.0 01/09/2020    BASOSABS 0.0 01/09/2020     CMP:    Lab Results   Component Value Date     01/14/2020    K 4.3 01/14/2020    K 4.6 01/08/2020     01/14/2020    CO2 22 01/14/2020    BUN 23 01/14/2020    CREATININE 0.90 01/14/2020    GFRAA >60.0 01/14/2020 LABGLOM >60.0 01/14/2020    GLUCOSE 90 01/14/2020    PROT 6.7 01/01/2020    LABALBU 4.5 01/01/2020    CALCIUM 9.5 01/14/2020    BILITOT 0.5 01/01/2020    ALKPHOS 100 01/01/2020    AST 21 01/01/2020    ALT 8 01/01/2020     BMP:    Lab Results   Component Value Date     01/14/2020    K 4.3 01/14/2020    K 4.6 01/08/2020     01/14/2020    CO2 22 01/14/2020    BUN 23 01/14/2020    LABALBU 4.5 01/01/2020    CREATININE 0.90 01/14/2020    CALCIUM 9.5 01/14/2020    GFRAA >60.0 01/14/2020    LABGLOM >60.0 01/14/2020    GLUCOSE 90 01/14/2020     TSH: No results found for: TSH  Vitamin B12 and Folate: No components found for: FOLIC,  X37  Urinalysis:   Lab Results   Component Value Date    NITRU Negative 01/01/2020    BLOODU Negative 01/01/2020    SPECGRAV 1.014 01/01/2020    GLUCOSEU Negative 01/01/2020           FUNCTIONAL ADL´S:     Independent: [ x ] Eating, [   x] Dressing, [  x ] Transferring, [  x ] Toileting, [x   ] Bathing, [ x  ] Continence  Dependent   : [  ] Eating, [   ] Dressing, [   ] Transferring, [   ] Erma Barillas, [   ] Rafi Hanks, [   ] Continence  W. assistant : [  ] Eating, [   ] Dressing, [   ] Transferring, [   ] Erma Barillas, [   ] Rafi Hanks, [   ] Continence    Radiology: Reviewed      No results found. Assessment and plan:  1. Left cerebral tumor with craniotomy  -patient is s/p craniotomy and currently being treated with Keppra, Decadron wean completed  -remains on PT/OT/ST  -monitor patient for seizure, headaches , blurred vision, N/V and numbness  2. Palliative care encounter  -spoke to patient and daughter about palliative care concept  -patient is currently on acute rehab PT/OT/ST  -patient is planned to be discharged home on Friday 1-  -patient and family is interested in Palliative care as outpatient  -plan is to follow up with patient as outpatient for Palliative care services  -Advance Care Planning  Discussed goals of care with patient.  Explained in extensive detail nuances between full code. -Goals of Care Discussion:  Disease process and goals of treatment were discussed in basic terms. Brooklynn's goal is to optimize available comfort care measures to  , manage symptomology including nausea, pain and ADL's. . We discussed the palliative care philosophy in light of those goals. We discussed all care options contingent on treatment response and QOL. Much active listening, presence, and emotional support were given.          Electronically signed by AMY Thomas NP on 1/15/2020 at 7:21 PM

## 2020-01-16 NOTE — PROGRESS NOTES
Occupational Therapy  Fac[place]/Department: [place] [address]  Daily Treatment Note  NAME: Ilia Manuel  : 1945  MRN: [id number]    Date of Service: 2020    Discharge Recommendations:  Home with assist PRN       Assessment      Patient Education: Safety  REQUIRES OT FOLLOW UP: No  Activity Tolerance  Activity Tolerance: Patient Tolerated treatment well  Safety Devices  Safety Devices in place: Not Applicable(Ind in room)         Patient Diagnosis(es): The primary encounter diagnosis was Cerebral malignant neoplasm ([facility] [address] 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 [facility] admit 20., Brain neoplasm [facility]), and Anxiety were also pertinent to this visit. has a past medical history of Anxiety, Cardiomegaly, Chronic kidney disease, Colon polyps, Depression, Gallbladder polyp, GERD (gastroesophageal reflux disease), Gout, Gout, Hypertension, Hyperuricemia, Hypothyroid, and Vitamin D deficiency. has a past surgical history that includes Dialysis fistula creation (Left); Kidney transplant (2018); craniotomy (N/A, 1/3/2020); and Upper gastrointestinal endoscopy (N/A, 2020).     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 left temporal anterior intracerebral tumor  Vital Signs  Patient Currently in Pain: No   Orientation     Objective    ADL  Grooming: Independent(able to stand @ sink and wash hands and face and brucsh teeth and don head wrap)  Toileting: Independent  Additional Comments: standing in shower and @ sink for groomimg        Balance  Sitting Balance: Independent  Standing Balance: Independent  Toilet Transfers  Toilet - Technique: Ambulating  Equipment Used: Grab bars  Toilet Transfer: Independent  Bed mobility  Supine to Sit: Independent  Scooting: Independent  Transfers  Sit to stand: Independent  Stand to sit: Independent        Coordination  Fine Motor: Pt instructed manipulation and turning of interlocking puzzle pcs to complete 2- 48 pc puzzles. Pt hand min difficulty turning pcs to fit in correct spots. Cognition  Problem Solving: Assistance required to generate solutions  Cognition Comment: Pt instructed in problem solving 2 48 pc puzzles. Mod verb and visual cues to complete both activities. Extended time required to complete. Plan   Plan  Times per week: 5-7x/wk 1-1 1/2 weeks  Current Treatment Recommendations: Strengthening, Functional Mobility Training, Home Management Training, Equipment Evaluation, Education, & procurement, Endurance Training, Balance Training, Neuromuscular Re-education, Self-Care / ADL, Safety Education & Training  Plan Comment: D/C home 20          Goals  Patient Goals   Patient goals :  To return to home when ready       Therapy Time   Individual Concurrent Group Co-treatment   Time In 1300         Time Out 1400         Minutes 60              Electronically signed by ELSI Kunz/L on 20 at 3:19 PM  ADL trainin minutes  Therapeutic activities: 39 minutes    ELSI Kunz/NAZANIN

## 2020-01-16 NOTE — PROGRESS NOTES
Facility/Department: [facility]  In-Patient Rehabilitation Insurance Update Note:   Physical Therapy  [facility]      NAME: Ford Green  : 1945  MRN: [id number]    Current Level of Function:  Bed mobility  Bridging: Independent  Rolling to Left: Independent  Rolling to Right: Independent  Supine to Sit: Independent  Sit to Supine: Independent  Scooting: Independent    Transfers  Sit to Stand: Independent  Stand to sit:  Independent  Bed to Chair: Independent  Car Transfer: Independent    Ambulation  Ambulation?: Yes  More Ambulation?: No  Ambulation 1  Surface: level tile;carpet  Device: No Device  Assistance: Modified Independent  Quality of Gait: Occasional deviation from straightline path, reciprocal pattern   Gait Deviations: Decreased arm swing  Distance: 300' x 2  Stairs/Curb  Stairs?: Yes   Stairs  # Steps : 12  Stairs Height: 6\"  Rails: None  Curbs: 6\"  Device: No Device  Assistance: Modified independent   Comment: Reciprocal pattern good safety    Assessment:  Met all goals    Updated Goals:  Short term goals  Short term goal 1: Pt to complete HEP with indep  Short term goal 2: Pt to achieve  DGI to demonstrate improved balance and decreased falls risk  Long term goals  Long term goal 1: Pt to complete bed mobility indep throughout program  Long term goal 2: Pt to complete all transfers with indep  Long term goal 3: Pt to ambulate 300ft+ without AD indep on level and unlevel surfaces  Long term goal 4: Pt to manage 12 steps with HR and indep              Mary Rodríguez PT, 20 at 12:33 PM

## 2020-01-17 VITALS
BODY MASS INDEX: 26.1 KG/M2 | WEIGHT: 138.23 LBS | HEIGHT: 61 IN | RESPIRATION RATE: 16 BRPM | TEMPERATURE: 97 F | OXYGEN SATURATION: 100 % | DIASTOLIC BLOOD PRESSURE: 83 MMHG | SYSTOLIC BLOOD PRESSURE: 124 MMHG | HEART RATE: 69 BPM

## 2020-01-17 PROCEDURE — 6370000000 HC RX 637 (ALT 250 FOR IP): Performed by: NURSE PRACTITIONER

## 2020-01-17 PROCEDURE — 6370000000 HC RX 637 (ALT 250 FOR IP): Performed by: PHYSICAL MEDICINE & REHABILITATION

## 2020-01-17 PROCEDURE — 99238 HOSP IP/OBS DSCHRG MGMT 30/<: CPT | Performed by: PHYSICAL MEDICINE & REHABILITATION

## 2020-01-17 RX ORDER — SUCRALFATE ORAL 1 G/10ML
1 SUSPENSION ORAL
Qty: 1200 ML | Refills: 3 | Status: SHIPPED | OUTPATIENT
Start: 2020-01-17

## 2020-01-17 RX ORDER — AMLODIPINE BESYLATE 5 MG/1
5 TABLET ORAL DAILY
Qty: 30 TABLET | Refills: 3 | Status: SHIPPED | OUTPATIENT
Start: 2020-01-18

## 2020-01-17 RX ORDER — TRAMADOL HYDROCHLORIDE 50 MG/1
50 TABLET ORAL EVERY 6 HOURS PRN
Qty: 40 TABLET | Refills: 0 | Status: SHIPPED | OUTPATIENT
Start: 2020-01-17 | End: 2020-01-31

## 2020-01-17 RX ADMIN — SUCRALFATE 1 G: 1 SUSPENSION ORAL at 06:11

## 2020-01-17 RX ADMIN — TACROLIMUS 3 MG: 1 CAPSULE ORAL at 08:19

## 2020-01-17 RX ADMIN — PANTOPRAZOLE SODIUM 20 MG: 20 TABLET, DELAYED RELEASE ORAL at 06:11

## 2020-01-17 RX ADMIN — METOPROLOL TARTRATE 25 MG: 25 TABLET, FILM COATED ORAL at 08:22

## 2020-01-17 RX ADMIN — SUCRALFATE 1 G: 1 SUSPENSION ORAL at 12:52

## 2020-01-17 RX ADMIN — MYCOPHENOLATE MOFETIL 500 MG: 250 CAPSULE ORAL at 08:19

## 2020-01-17 RX ADMIN — ANTACID TABLETS 500 MG: 500 TABLET, CHEWABLE ORAL at 08:22

## 2020-01-17 RX ADMIN — AMLODIPINE BESYLATE 5 MG: 5 TABLET ORAL at 08:22

## 2020-01-17 RX ADMIN — PREDNISONE 5 MG: 5 TABLET ORAL at 08:22

## 2020-01-17 RX ADMIN — CINACALCET HYDROCHLORIDE 30 MG: 30 TABLET, FILM COATED ORAL at 08:22

## 2020-01-17 RX ADMIN — DULOXETINE HYDROCHLORIDE 30 MG: 30 CAPSULE, DELAYED RELEASE ORAL at 08:22

## 2020-01-17 RX ADMIN — LEVETIRACETAM 750 MG: 250 TABLET ORAL at 08:22

## 2020-01-17 RX ADMIN — SULFAMETHOXAZOLE AND TRIMETHOPRIM 1 TABLET: 400; 80 TABLET ORAL at 08:22

## 2020-01-17 ASSESSMENT — PAIN SCALES - GENERAL: PAINLEVEL_OUTOF10: 0

## 2020-01-17 NOTE — DISCHARGE SUMMARY
[id number] : 1945 Age:  69 years Order Date: 2020 8:45 AM. Gender: Female Exam: CT HEAD    Findings communicated directly with Dr. Ag Miguel  on 2020 9:25 AM . 1. Abnormal exam. Large area of hypodensity and edema with mass effect on left frontal, temporal and parietal lobes and left basal ganglia with left to right subfalcine herniation of 0.56 cm. Findings are concerning for potential underlying mass/malignancy with extensive edema and mass effect versus cerebrovascular infarction/accident with edema and mass effect. Further evaluation MRI of the brain with and without IV contrast is recommended. 2. Vascular calcifications within the bilateral internal carotid artery cavernous segments and the vertebral arteries. Ct Chest  2020   1. THE PROXIMAL ESOPHAGUS IS MILDLY ECTATIC. THERE IS AN AREA OF PROXIMAL. STILL DILATATION WITH DEBRIS NOTED. FINDINGS MAY REPRESENT AN AREA OF IMPACTION. THERE MAY BE A ASSOCIATED ESOPHAGEAL DIVERTICULUM. NO LINDA PERFORATION. FURTHER EVALUATION RECOMMENDED. CONSIDER GI CONSULT 2. THE VISUALIZED PORTION OF KIDNEYS SHOW THAT THERE ARE ATROPHIC. THERE IS AREAS LOW-ATTENUATION BILATERALLY LIKELY RENAL CYST BUT NOT FULLY CHARACTERIZED IN THIS NONCONTRAST STUDY. CORRELATE CLINICALLY AND FOLLOW-UP. OTHER FINDINGS AS DETAILED ABOVE      Mra Head  2020  No aneurysm or high-grade stenosis, or vascular malformation of the visualized cerebral vasculature. No significant interval change in left frontal, parietal, and temporal lobe edema and 6 mm of rightward midline shift. Xr Chest  2020   Impression:  1. Possible nodular abnormality left lower lung, not well characterized, measuring 1.4 cm, correlation with CT scan chest recommended. 2. Enlarged cardiomediastinal silhouette, the possibility of underlying pericardial effusion or other cardiac abnormalities are not excluded on the basis of this exam, clinical correlation recommended. . 3. Nonspecific bibasilar airspace disease, findings can be seen in infiltrate/pneumonia and/or atelectasis. [place] 4. Vascular calcifications thoracic aorta     Mri Brain W Wo 1/1/2020  1. Enhancing mass/malignancy in the left middle cranial fossa/left anterior temporal lobe, findings suspicious for meningioma although glioblastoma is not excluded. There is extensive edema extending throughout the left frontal lobe, left parietal lobe and left basal ganglia/temporal lobe, without evidence of restricted diffusion, greater than expected. Findings could reflect extensive edema from the mass/malignancy. Other etiologies are not excluded. . Neurosurgical/neurological evaluation recommended. 2. Left to right subfalcine herniation of 0.56 cm. Neurosurgical consultation and evaluation is recommended. 3. Dominant left vertebral artery        Previous extensive, complex labs, notes and diagnostics reviewed and analyzed. ALLERGIES:    Allergies as of 01/09/2020 - Review Complete 01/09/2020   Allergen Reaction Noted    Benadryl [diphenhydramine]  05/08/2017    Chlorhexidine  05/08/2017      (please also verify by checking MAR)      I reviewed her [facility] prescription monitoring service data sheets in hopes of eliminating polypharmacy and weaning to the lowest effective dose of pain medications and eliminating the concomitant use of benzodiazepines. I see no medications of concern. I see no habits of combining sedatives and narcotics. Complex Physical Medicine & Rehab Issues Assess & Plan:   1. Severe abnormality of gait and mobility and impaired self-care and ADL's secondary to progressive balance deficits status post left temporal intracranial tumor. Functional and medical status have greatly stabilized improved and improved status post acute rehab at [facility] therefore patient is scheduled for discharge home with her daughter today.   2. Bowel severe opiate related constipation and Bladder dysfunction:  frequent toileting, ambulate to bathroom with assistance, check post void residuals. Check for C.difficile x1 if >2 loose stools in 24 hours, continue bowel & bladder program.  Monitor bowel and bladder function. Lactinex 2 PO every AC. MOM prn, Brown Bomb prn, Glycerin suppository prn, enema prn. Add lactulose daily. 3. Severe osteoarthritis and neck pain as well as generalized OA pain: reassess pain every shift and prior to and after each therapy session, give prn Tylenol and add Norco as needed, she will wean off of the Ultram due to seizure risk modalities prn in therapy, Lidoderm, K-pad prn. She may benefit from something on Norco at home and would likely benefit from palliative care at discharge. I will add palliative care consult. 4. Skin healing left craniotomy incision and breakdown risk:  continue pressure relief program.  Daily skin exams and reports from nursing. 5. Severe fatigue due to nutritional and hydration deficiency: Add vitamin B12 vitamin D and CoQ10 continue to monitor I&Os, calorie counts prn, dietary consult prn.  6. Acute episodic insomnia with situational adjustment disorder:  prn Ambien, monitor for day time sedation. 7. Falls risk elevated:  patient to use call light to get nursing assistance to get up, bed and chair alarm. 8. Elevated DVT risk: progressive activities in PT, continue prophylaxis SREEDHAR hose, elevation and Lovenox discontinued due to increased GERD. 9. Complex discharge planning: We will complete family training and preparations for her discharge later today January 17, 2020 home with outpatient services. I am recommending palliative care services at discharge. Status post weekly team meeting  Monday to assess progress towards goals, discuss and address social, psychological and medical comorbidities and to address difficulties they may be having progressing in therapy. Patient and family education is in progress. The patient is to follow-up with their family physician after discharge.         Complex Active General Medical Issues that complicate care Assess & Plan:    1. End stage kidney disease,   Hx of kidney transplant-limit toxic medications recheck BMP and CBC titrate Prograf and CellCept titrate prednisone for the swelling at the tumor site. Patient is not on prednisone for the kidney transplant. Consult Dr. Glenroy Roberts. 2.   Essential hypertension, occasional angina mixed dyslipidemia-vital signs every shift dose and titrate cardiac medications to include Norvasc Lopressor holding blood thinners postoperatively consult hospitalist for backup medical check troponins EKG was unremarkable 1/11/2020  3. Gout Elevated blood uric acid level-low purine diet when possible  4. Cerebral malignant neoplasm -status post resection follow-up with Dr. Becki Murcia as an outpatient  5. Generalized seizure -add Keppra titrate Decadron for postop swelling  6. Postherpetic neuralgia-titrate pain medications using lowest effective dose  7. Epigastric abdominal pain-abdominal binder Lidoderm  8. Immunosuppressive management encounter following kidney transplant-vitamin B12 vitamin D co-Q10 strict handwashing  9. Pain in thoracic spine and generalized neuropathic pain-titrate Cymbalta, titrate Ultram and Tylenol and increased mattress  10. Vitamin D deficiency-bolus dose vitamin D  11. Active severe GERD-titrate Protonix monitor stools for blood discontinue Lovenox. Continue to titrate Carafate add Tums.   12. Oral pharyngeal dysphasia-consult speech and language pathology add dental soft diet thin liquids           Davonte Sterling D.O., PM&R     Attending    [address]

## 2020-01-17 NOTE — PLAN OF CARE
safe/functional mobility  Outcome: Met This Shift     Problem: Pain:  Goal: Pain level will decrease  Description  Pain level will decrease  1/17/2020 0120 by Oleg Villalba RN  Outcome: Met This Shift  1/16/2020 1458 by Kasie Hubbard RN  Outcome: Ongoing  Goal: Control of acute pain  Description  Control of acute pain  1/17/2020 0120 by Oleg Villalba RN  Outcome: Met This Shift  1/16/2020 1458 by Kasie Hubbard RN  Outcome: Ongoing  Goal: Control of chronic pain  Description  Control of chronic pain  1/17/2020 0120 by Oleg Villalba RN  Outcome: Met This Shift  1/16/2020 1458 by Kasie Hubbard RN  Outcome: Ongoing

## 2020-01-17 NOTE — PROGRESS NOTES
Nephrology Progress Note    Assessment:  S/P kidney transplant  Hypertension  Anemia  S/P craniotomy menigioma  Gout  Anxiety    Plan:discharge today see in office    Patient Active Problem List:     End stage kidney disease ([facility]     Combined fat and carbohydrate induced hyperlipemia     Essential hypertension     Acid reflux     Gout     Angina, class IV (HCC)     Mixed dyslipidemia     Elevated blood uric acid level     Brain neoplasm (HCC)     Cerebral malignant neoplasm (HCC)     Generalized seizure (HCC)     Aphasia     Anxiety     Chronic pain disorder     Postherpetic neuralgia     Depression, major, recurrent, moderate (HCC)     Epigastric abdominal pain     History of colonic polyps     Immunosuppressive management encounter following kidney transplant     Pain in thoracic spine     Esophageal disorder     Abnormality of gait and mobility due to 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 [facility] admit 01/09/20. Cardiomegaly     Hypothyroid     Vitamin D deficiency     BMI 27.0-27.9,adult     Hx of kidney transplant      Subjective:  Admit Date: 1/9/2020    Interval History: feeling well    Medications:  Scheduled Meds:   pantoprazole  20 mg Oral BID AC    calcium carbonate  500 mg Oral BID    sucralfate  1 g Oral TID AC    amLODIPine  5 mg Oral Daily    cinacalcet  30 mg Oral Daily    DULoxetine  30 mg Oral Daily    levETIRAcetam  750 mg Oral BID    metoprolol tartrate  25 mg Oral BID    mycophenolate  500 mg Oral BID    predniSONE  5 mg Oral Daily    sulfamethoxazole-trimethoprim  1 tablet Oral Daily    tacrolimus  2 mg Oral QPM    tacrolimus  3 mg Oral QAM     Continuous Infusions:    CBC: No results for input(s): WBC, HGB, PLT in the last 72 hours. CMP:  No results for input(s): NA, K, CL, CO2, BUN, CREATININE, GLUCOSE, CALCIUM, LABGLOM in the last 72 hours. Troponin: No results for input(s): TROPONINI in the last 72 hours.   BNP: No

## 2020-01-21 NOTE — PROGRESS NOTES
Facility/Department: [facility]  Physical Therapy Acute Rehab Discharge Summary  [facility]    NAME: Lance Mccartney  : 1945  MRN: [id number]    Admission Date: 2020 11:54 AM  Discharge Date: 2020    Rehab Diagnosis(es): NTBI secondary to Craniotomy for removal of Left temporal anterior intracerebral tumor  Patient Active Problem List    Diagnosis Date Noted    Abnormality of gait and mobility due to 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   [facility] admit 20. 2020    Cardiomegaly 2020    Hypothyroid 2020    Vitamin D deficiency 2020    BMI 27.0-27.9,adult 2020    Hx of kidney transplant 2020    Generalized seizure ([address]     Aphasia     Cerebral malignant neoplasm ([address] 2020    Brain neoplasm ([address] 2020    Depression, major, recurrent, moderate (HCC) 10/08/2018    Postherpetic neuralgia 2018    Anxiety 2018    Esophageal disorder 2018    Pain in thoracic spine 07/15/2018    Epigastric abdominal pain 2018    Immunosuppressive management encounter following kidney transplant 2018    History of colonic polyps 2017    Chronic pain disorder 2017    Acid reflux 2017    Angina, class IV ([address] 2017    Mixed dyslipidemia 2017    Elevated blood uric acid level 2017    Gout 2015    Combined fat and carbohydrate induced hyperlipemia 2015    End stage kidney disease ([address] 10/27/2011    Essential hypertension 10/27/2011       Past Medical History:   Diagnosis Date    Anxiety     Cardiomegaly     Chronic kidney disease     Colon polyps     Depression     Gallbladder polyp     GERD (gastroesophageal reflux disease)     Gout     Gout     Hypertension     Hyperuricemia     Hypothyroid     Vitamin D deficiency      Past Surgical History:   Procedure Laterality Date    CRANIOTOMY N/A 1/3/2020 CRANIOTOMY FOR EXCISION OF BRAIN TUMOR performed by Chavez Canales MD at [address] [place] [address] Left     KIDNEY TRANSPLANT  03/03/2018    UPPER GASTROINTESTINAL ENDOSCOPY N/A 1/6/2020    EGD performed by Shabbir La MD at [facility]       Indications for Skilled Intervention: Decreased functional mobility , Decreased balance, Decreased coordination, Decreased vision/visual deficit    GOALS: MET ALL  Short term goals  Short term goal 1: Pt to complete HEP with indep  Short term goal 2: Pt to achieve 19/24 DGI to demonstrate improved balance and decreased falls risk  Long term goals  Long term goal 1: Pt to complete bed mobility indep throughout program  Long term goal 2: Pt to complete all transfers with indep  Long term goal 3: Pt to ambulate 300ft+ without AD indep on level and unlevel surfaces  Long term goal 4: Pt to manage 12 steps with HR and indep    Summary of POC: Throughout rehab stay, pt received skilled physical therapy treatment 1.0 hours daily for 1 week. Skilled Services Provided: Strengthening, Functional Mobility Training, Neuromuscular Re-education, Home Exercise Program, Equipment Evaluation, Education, & procurement, Transfer Training, Safety Education & Training, Balance Training, Endurance Training, Stair training, Patient/Caregiver Education & Training, Cognitive/Perceptual Training, Gait Training (Please refer to daily notes for all treatment details)    ASSESSMENT: Pt achieving all goals as established and is appropriate for DC from acute rehab PT program.    Discharge Plan: DC home at ambulatory level.     Electronically signed by Louis Segundo PT on 1/21/2020 at 10:37 AM

## 2020-01-28 ENCOUNTER — OFFICE VISIT (OUTPATIENT)
Dept: PALLATIVE CARE | Age: 75
End: 2020-01-28
Payer: COMMERCIAL

## 2020-01-28 VITALS
OXYGEN SATURATION: 99 % | RESPIRATION RATE: 16 BRPM | BODY MASS INDEX: 22.82 KG/M2 | HEART RATE: 69 BPM | HEIGHT: 65 IN | WEIGHT: 137 LBS | TEMPERATURE: 97.9 F | SYSTOLIC BLOOD PRESSURE: 132 MMHG | DIASTOLIC BLOOD PRESSURE: 70 MMHG

## 2020-01-28 PROCEDURE — 1036F TOBACCO NON-USER: CPT | Performed by: NURSE PRACTITIONER

## 2020-01-28 PROCEDURE — 1090F PRES/ABSN URINE INCON ASSESS: CPT | Performed by: NURSE PRACTITIONER

## 2020-01-28 PROCEDURE — 1123F ACP DISCUSS/DSCN MKR DOCD: CPT | Performed by: NURSE PRACTITIONER

## 2020-01-28 PROCEDURE — 99214 OFFICE O/P EST MOD 30 MIN: CPT | Performed by: NURSE PRACTITIONER

## 2020-01-28 PROCEDURE — 3017F COLORECTAL CA SCREEN DOC REV: CPT | Performed by: NURSE PRACTITIONER

## 2020-01-28 PROCEDURE — G8484 FLU IMMUNIZE NO ADMIN: HCPCS | Performed by: NURSE PRACTITIONER

## 2020-01-28 PROCEDURE — 99497 ADVNCD CARE PLAN 30 MIN: CPT | Performed by: NURSE PRACTITIONER

## 2020-01-28 PROCEDURE — G8420 CALC BMI NORM PARAMETERS: HCPCS | Performed by: NURSE PRACTITIONER

## 2020-01-28 PROCEDURE — 4040F PNEUMOC VAC/ADMIN/RCVD: CPT | Performed by: NURSE PRACTITIONER

## 2020-01-28 PROCEDURE — 1111F DSCHRG MED/CURRENT MED MERGE: CPT | Performed by: NURSE PRACTITIONER

## 2020-01-28 PROCEDURE — G8400 PT W/DXA NO RESULTS DOC: HCPCS | Performed by: NURSE PRACTITIONER

## 2020-01-28 PROCEDURE — G8427 DOCREV CUR MEDS BY ELIG CLIN: HCPCS | Performed by: NURSE PRACTITIONER

## 2020-01-28 ASSESSMENT — ENCOUNTER SYMPTOMS
DIARRHEA: 0
TROUBLE SWALLOWING: 0
SORE THROAT: 0
CHEST TIGHTNESS: 0
SHORTNESS OF BREATH: 0
COUGH: 0
BACK PAIN: 0
SINUS PAIN: 0
CONSTIPATION: 0
NAUSEA: 0
WHEEZING: 0
ABDOMINAL PAIN: 0
VOMITING: 0

## 2020-01-28 NOTE — PROGRESS NOTES
Subjective:    ID: Patient was seen at [facility] for symptom management , advance care planning and goals of care discussion. Patient was accompanied by daughter: Dangelo Carranza    Chief Complaint   Patient presents with    Fatigue    Other     fall      HPI        Kwadwo Jones was seen  for initial palliative medicine consult for symptom management r/t her complex PMH: brain neoplasm,HTN, GERD,Gout Depression, CKD-kidney transplant in 2018. Patient was referred to us by Dr. Lore Paul. Patient is a 69year old female whom presented today alert and oriented and in NAD. Patient is [place] speaking only and daughter with her translating. Patient was recently hospitalized had craniotomy and excision of  left temporal anterior intracerebral tumor on 1-3-2020. Patient was in hospital for acute rehab and discharged to home to hospitals whom is also POA. Patient reports feeling well overall. Appetite good, no swallowing difficulty. No weight loss. Negative for pain. Negative for headache, visual, disturbance, swallowing difficulty. Patient denies any fever, chills, N/V/D or constipation. Patient reports occasional dizziness with sudden rising from sitting to standing position. Patient reports  falling x 1 yesterday while getting up from chair suddenly from sleeping. Denies any head injury, reports falling on buttocks. Patient is able to ambulate without assist. Currently is scheduled to start PT/OT as outpatient. Patient denies any depression/anxiety or sleep disturbance.     Past Medical History:   Diagnosis Date    Anxiety     Cardiomegaly     Chronic kidney disease     Colon polyps     Depression     Gallbladder polyp     GERD (gastroesophageal reflux disease)     Gout     Gout     Hypertension     Hyperuricemia     Hypothyroid     Vitamin D deficiency      Past Surgical History:   Procedure Laterality Date    CRANIOTOMY N/A 1/3/2020    CRANIOTOMY FOR EXCISION OF BRAIN TUMOR [facility]att [facility]  DIALYSIS FISTULA CREATION Left     KIDNEY TRANSPLANT  03/03/2018    UPPER GASTROINTESTINAL ENDOSCOPY N/A 1/6/2020    EGD performed by Eddie Bradford MD at [address] [place] [address] Marital status:      Spouse name: Not on file    Number of children: Not on file    Years of education: Not on file    Highest education level: Not on file   Occupational History    Occupation: [de-identified]    Occupation: Caregiver to the elderly   Social Needs    Financial resource strain: Not very hard    Food insecurity:     Worry: Never true     Inability: Never true    Transportation needs:     Medical: No     Non-medical: No   Tobacco Use    Smoking status: Never Smoker    Smokeless tobacco: Never Used   Substance and Sexual Activity    Alcohol use: Never     Frequency: Never    Drug use: Never    Sexual activity: Not Currently     Partners: Male   Lifestyle    Physical activity:     Days per week: 0 days     Minutes per session: 0 min    Stress: Only a little   Relationships    Social connections:     Talks on phone: More than three times a week     Gets together: More than three times a week     Attends Yazdanism service: More than 4 times per year     Active member of club or organization: No     Attends meetings of clubs or organizations: Never     Relationship status:     Intimate partner violence:     Fear of current or ex partner: No     Emotionally abused: No     Physically abused: No     Forced sexual activity: No   Other Topics Concern    Not on file   Social History Narrative         Lives With: 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 She is a [address], [place]. 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     Type of Home: House Multi-level, Bed/Bath upstairs    Home Access: Stairs to enter with rails    Entrance Stairs - Number of Steps: 2    Bathroom Negative for congestion, mouth sores, sinus pain, sore throat and trouble swallowing. Respiratory: Negative for cough, chest tightness, shortness of breath and wheezing. Cardiovascular: Negative for chest pain, palpitations and leg swelling. Gastrointestinal: Negative for abdominal pain, constipation, diarrhea, nausea and vomiting. Endocrine: Negative for polydipsia, polyphagia and polyuria. Genitourinary: Negative for dysuria, flank pain, frequency and urgency. Musculoskeletal: Positive for gait problem. Negative for arthralgias, back pain, joint swelling and myalgias. Skin: Negative. Neurological: Positive for weakness. Negative for tremors, seizures, speech difficulty, numbness and headaches. Psychiatric/Behavioral: Negative for agitation, confusion, sleep disturbance and suicidal ideas. The patient is not nervous/anxious. Objective:   /70 (Site: Right Upper Arm, Position: Sitting)   Pulse 69   Temp 97.9 °F (36.6 °C)   Resp 16   Ht 5' 5\" (1.651 m)   Wt 137 lb (62.1 kg)   LMP  (LMP Unknown)   SpO2 99%   BMI 22.80 kg/m²    Wt Readings from Last 3 Encounters:   01/28/20 137 lb (62.1 kg)   01/22/20 130 lb (59 kg)   01/17/20 138 lb 3.7 oz (62.7 kg)     Physical Exam  Constitutional:       Appearance: She is well-developed. HENT:      Head: Normocephalic and atraumatic. Eyes:      Pupils: Pupils are equal, round, and reactive to light. Neck:      Musculoskeletal: Normal range of motion and neck supple. Cardiovascular:      Rate and Rhythm: Normal rate and regular rhythm. Heart sounds: No murmur. No friction rub. No gallop. Pulmonary:      Effort: Pulmonary effort is normal.      Breath sounds: Normal breath sounds. No wheezing or rales. Chest:      Chest wall: No tenderness. Abdominal:      General: Bowel sounds are normal. There is no distension. Palpations: Abdomen is soft. Tenderness: There is no abdominal tenderness. There is no guarding. Musculoskeletal: Normal range of motion. General: No tenderness or deformity. Skin:     General: Skin is warm and dry. Findings: No erythema or rash. Neurological:      Mental Status: She is alert and oriented to person, place, and time. Motor: Weakness present. Coordination: Coordination abnormal.      Gait: Gait abnormal.         Assessment and Plan:      1. Abnormality of gait and mobility due to 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 [facility] admit 01/09/20.  -patient had a fall x1 and reports getting dizzy when getting up from chair suddenly  -patient is scheduled to start PT/OT as outpatient  2. Cerebral malignant neoplasm [facility])  -patient to follow up with neurosurgeon as scheduled  3. Hx of kidney transplant  -patient has follow up with nephrology  -continue with mycophenolate and tacrolimus as ordered  4. Depression, major, recurrent, moderate ([facility] [address]  -patient is stable on current regimen  -continue with Cymbalta as ordered    5. Palliative care encounter  -follow up with Palliative care in 2 weeks. There are no discontinued medications. - Advance Care Planning   We discussed Living Will (LW), and [facility]) as well as their activation. Patient choice discussed. LW/HCPOA in place: YES  Tasked  for assistance with completing paperwork: YES   Code status discussed :YES    Code Status: Full Code. All related questions were answered completely.  -at least 17 min were spent discussing   - Goals of Care Discussion:  Disease process and goals of treatment were discussed in basic terms. Brooklynn's goal is to optimize available comfort care measures including dizziness, weakness . We discussed the palliative care philosophy in light of those goals. We discussed all care options contingent on treatment response and QOL. Much active listening, presence, and emotional support were given.

## 2020-01-30 ENCOUNTER — HOSPITAL ENCOUNTER (OUTPATIENT)
Dept: OCCUPATIONAL THERAPY | Age: 75
Setting detail: THERAPIES SERIES
Discharge: HOME | End: 2020-01-30
Payer: COMMERCIAL

## 2020-01-30 PROCEDURE — 97167 OT EVAL HIGH COMPLEX 60 MIN: CPT

## 2020-01-30 NOTE — PROGRESS NOTES
NT 4/5   Internal Rotation 4/5 WFL NT 0-80 WFL NT 4/5   External Rotation 4/5 WFL NT 0-60 WFL NT 4/5   Elbow          Flexion 4/5 WFL NT 0-150 WFL NT 5/5   Extension 4/5 WFL -0 WFL NT 5/5   Pronation 4/5 WFL NT 0-80 WFL NT 5/5   Supination 5/5 WFL NT 0-80 WFL NT 5/5   Wrist          Flexion 5/5 WFL NT 0-70 WFL NT 5/5   Extension  5/5 WFL NT 0-60 WFL NT 5/5   Ulnar deviation 5/5 WFL NT 0-30 WFL NT 5/5   Radial Deviation  5/5 WFL NT 0-20 WFL NT 5/5   Comments:       Hand Range of Motion      Right  Left   Normal  MP PIP DIP  MP PIP DIP    0-90 0-100 0-70  0-90 0-100 0-70    A P A P A P  A P A P A P   Index                Extension WFL NT WFL NT WFL NT  WFL NT WFL NT WFL NT   Flexion WFL NT WFL NT WFL NT  WFL NT WFL NT WFL NT   Middle                Extension WFL NT WFL NT WFL NT  WFL NT WFL NT WFL NT   Flexion WFL NT WFL NT WFL NT  WFL NT WFL NT WFL NT   Ring                Extension WFL NT WFL NT WFL NT  WFL NT WFL NT WFL NT   Flexion  WFL NT WFL NT WFL NT  WFL NT WFL NT WFL NT   Little                 Extension WFL NT WFL NT WFL NT  WFL NT WFL NT WFL NT   Flexion  WFL NT WFL NT WFL NT  WFL NT WFL NT WFL NT   Comments:       Right   Left Norms    A P  A P    Thumb         MP Flexion WFL NT  WFL NT 0-70   IP Flexion WFL NT  WFL NT 0-90   Radial Abduction WFL NT  WFL NT 0-50   Palmar Adduction WFL NT  WFL NT 0-40   Comments:    Opposition  Right Hand: WFL  Left Hand: mild ataxia    Edema Pt has blown fistula places throughout the L forearm from dialysis       & Pinch Strength  Average of 3 tries Right Norm Left Norm    (lb) 40/25/32 46 20/20/20/ 41   Mccloud Pinch (lb) 9/9/9 9.5 9/8/8 8.5   Lateral Pinch (lb) 6.5 /6/6 11 7/7/7 10   Comments:    Coordination & Dexterity   Right Norm Left Norm          Nine Hole Peg Test  (seconds) 29  32    Comments:     Skin Integrity  Pt has remote fistula/lumps on her L forearm    Cognition:    Memory IND daughter states Pt sometimes does not understand the context of some questions    Sensation:     WFL    Tone:   normal tone      Joint Mobility  No concerns     Palpation/Tenderness  none    Education/Barriers to learning:     Barriers:language and medical history   Education on this date: OT role and POC     ASSESSMENT        Problems:  [] Decreased BUE strength with sustained activity  [] Decreased UE ROM   [x] Decreased B  strength   [x] Decreased fine motor skills due to mild ataxia >L thanR  [] Increased pain    [] Decreased ADL status   [] Decreased joint mobility   [] Decreased coordination   [] Decreased sensation    [] Other      Complexity:         [] Low Complexity:   ¨ History: Brief history including review of medical records relating to the problem  ¨ Exam: 1-3 performance Deficits  ¨ Assistance/Modification: No assistance or modifications required to perform tasks. No comorbities affecting occupational performance  [x] Medium Complexity:   ¨ History: Expanded review of medical records and additional review of physical, cognitive, or psychosocial history related to current functional performance  ¨ Exam: 3-5 performance deficits  ¨ Assistance/Modification: Min/mod assistance or modifications required to perform tasks. May have comorbidities that affect occupational performance. [] High Complexity:   ¨ History: Extensive review of medical records and additional review of physical, cognitive, or psychosocial history related to current functional performance. ¨ Exam: 5 or more performance deficits  ¨ Assistance/Modification: Significant assistance or modifications required to perform tasks. Have comorbidities that affect occupational performance.         Rehabilitation Potential:    [] Excellent [] Good  [] Fair  [] Poor    PLAN OF CARE     To see patient for 2 x/week for 8 visits for the following treatment interventions:    [x] Evaluate & Treat [] Neuromuscular Re-education:     [x] Re-evaluation [] Tissue (stress) Loading Program    [] Pain Management  [x] PROM/Stretching/AAROM/AROM    [] Edema Management  [] Splinting    [] Wound Care/Scar Management  [] Desensitization    [] ADL Training  [x] Strengthening/Graded Therapeutic Activity    [] Tendon Repair Program  [x] Coordination/Dexterity Training    [] Instruction/Application of energy [x] Manual Techniques        conservation, work simplification [x] Instruction in HEP        joint protection, body mechanics [] Aquatic Therapy    [] Modalities []  Ultrasound           [] Infrared [] Hot/Cold Pack:         [] Paraffin   [] Other   [] Electrical Stimulation [] Fluidotherapy     [x] D/C plan: Will assess pt after established visits to determine need for continued therapy. GOALS:     LTG 1 : Pt will increase AROM of BUE from current by 10-15 degrees with sustained activity° to increase performance with I/ADL's. LTG 2 : Pt will increase BUE strength from current by 1/2 to 1muscle grade to aid endurance and  to increase performance with I/ADL's. LTG 3: Pt will increase B  strength from current by 2-5 lbs lbs to increase performance with I/ADL's. LTG 4 : Pt will increase B pinch strength from current by 2-5 lbs to increase performance with I/ADL's. LTG 5 : Pt will increase dexterity in B hand as observed by 9 hole peg test by decreasing time from current  by 5-8 seconds seconds to increase performance with I/ADL's. LTG 6: Pt will be IND with HEP following verbal instruction and demonstration and updated as Pt improves.     SHANE Minor 1/30/2020 10:09 AM Electronically signed by SHANE Minor on 1/30/2020 at 4:48 PM    Falls Risk Assessment     Age: 0-59 = 0          60-69= 1            > 70= 2 History of Falls:   0  Falls  last 6 mo = 0    1  fall  Last  6 mo = 1   1-3 falls last 6 mo = 2 Medical History:   Parkinsons, CVA,HTN, vertigo, >4 meds, use of assistive device (1pt.for each)  Mental Status:  A & O x 3 = 0  Disoriented to person, place, or time = 2     [x]  INITIAL ASSESSMENT:                                                      Date: 1/30/2020                                                  Age:   4                                                         Falls: 2                                                          PMH: 4                                                          Mental:  1                                                      Total:  9                                                        *Patient 4 or younger:   Vestibular:     Signature: ELSI Pierre/NAZANIN                                                      High Risk for Falls: >8  Intermediate Risk for Falls: 4-8   Low Risk for Falls: <4      Time In  1000    Time out  1100    60 min

## 2020-02-03 ENCOUNTER — HOSPITAL ENCOUNTER (OUTPATIENT)
Dept: OCCUPATIONAL THERAPY | Age: 75
Setting detail: THERAPIES SERIES
Discharge: HOME | End: 2020-02-03
Payer: COMMERCIAL

## 2020-02-03 PROCEDURE — 97110 THERAPEUTIC EXERCISES: CPT

## 2020-02-03 PROCEDURE — 97530 THERAPEUTIC ACTIVITIES: CPT

## 2020-02-07 ENCOUNTER — HOSPITAL ENCOUNTER (OUTPATIENT)
Dept: OCCUPATIONAL THERAPY | Age: 75
Setting detail: THERAPIES SERIES
Discharge: HOME | End: 2020-02-07
Payer: COMMERCIAL

## 2020-02-07 PROCEDURE — 97530 THERAPEUTIC ACTIVITIES: CPT

## 2020-02-07 PROCEDURE — 97110 THERAPEUTIC EXERCISES: CPT

## 2020-02-10 ENCOUNTER — HOSPITAL ENCOUNTER (OUTPATIENT)
Dept: OCCUPATIONAL THERAPY | Age: 75
Setting detail: THERAPIES SERIES
Discharge: HOME | End: 2020-02-10
Payer: COMMERCIAL

## 2020-02-10 PROCEDURE — 97530 THERAPEUTIC ACTIVITIES: CPT

## 2020-02-10 NOTE — PROGRESS NOTES
Occupational Therapy  Daily Note     Name: Ilia Manuel  : 1945  MRN: [id number]  Diagnosis: Other symptoms and signs involving the nervous and musculoskeletal systems and Unspecified symptoms and signs involving general sensations and perceptions. Visit Information:   OT Insurance Information: Medicare  Total # of Visits Approved: 8  Progress Note Due Date: 20  Progress Note Counter: 3/8    Date: 2/10/2020    OT Therapeutic activities 60 minutes for 4 unit(s)       OT Individual Minutes  Time In: 0900  Time Out: 1000  Minutes: 61    Referring Practitioner: Dr. 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 Subjective:    Subjective:  Generalized seizures, kidney transplant, [place] speaking language. Additional Pertinent Hx: Eval 2020 CHRISTELLE Talley OTR/NAZANIN    Pain rating:     Pre-treatment pain:0       Pain after treatment: 0                    Focus of treatment was on the following:   []? Neuromuscular Re-education    []? Orthotic/Splint/Brace: fabrication/education [x]? UE strength  []? Right []? Left [x]? Both   [x]?  Strength   []? Right []? Left [x]? Both     []? Cognition  []? Balance  []? Posture/positioning   []? Decreasing pain    []? Coordination []? ADL's []? Functional Mobility []? Home management    [x]? ROM []? Endurance  []? Transfers [x]? Fine Motor   []? Visual []? Caregiver training []? Perceptual                    []? Sensory                    []? Edema management  []? Other: HEP's for UE strengthening      Activity: B UE resistive clothes pins for pinch strengthening    Pt required/had:  [x]Level of assist: []IND []Mod IND  [x]Supervision  []Set up  [] Min [] Mod  []Max  []Dep       Comments:   [x] Level of difficulty: [x]Min         []Mod  []Max      Comments: difficulty with the black 8# pins   []Physical prompts: []Min []Mod []Max []Cheesh-Na   Comments:    []Increased time:      Comments:   []Sensory break: []Vestibular []Tactile []Oral motor Exercises:  Previously instructed and issued: Other exercises 1: HEP: Pink Theraputty for hand strengthening  Other exercises 2: HEP: Pink Foam block exercises for increased hand and digit strengthening. Other exercises 3: HEP: Tabletop towel exercises for increased AROM of B UE's shoulders  Other exercises 4: HEP: Can exercises for strengthening of B UE's. Assessment:   Pt tolerated treatment well. Plan:   Continue POC    Collaborated with OTR after the OTR last treated this patient. Goals:1-6 addressed. Long term goals  Time Frame for Long term goals : 2 x/week for 8 visits   Long term goal 1:  Pt will increase AROM of BUE from current by 10-15 degrees with sustained activity° to increase performance with I/ADL's. Long term goal 2:  Pt will increase BUE strength from current by 1/2 to 1muscle grade to aid endurance and  to increase performance with I/ADL's. Long term goal 3: Pt will increase B  strength from current by 2-5 lbs lbs to increase performance with I/ADL's. Long term goal 4: Pt will increase B pinch strength from current by 2-5 lbs to increase performance with I/ADL's. Long term goal 5:  Pt will increase dexterity in B hand as observed by 9 hole peg test by decreasing time from current  by 5-8 seconds seconds to increase performance with I/ADL's. Long term goals 6: Pt will be IND with HEP following verbal instruction and demonstration and updated as Pt improves.     RIDGE Lara   2/10/2020  10:20 AM   Electronically signed by DOLORES Lara on 2/10/20 at 10:22 AM

## 2020-03-03 NOTE — FLOWSHEET NOTE
Call received from Dr. Jeb Marie. States he has changed his mind and has decided to keep her and will be in contact again later.  Electronically signed by Kris Duong RN on 1/2/20 at 1:38 PM Received call from [facility] with a pt in need of CORE.   Pt has not seen a cardiologist while inpatient. Appt made with next available CORE MD, Dr Soria. Labs placed per CORE recommendations.   Elizabeth Boss RN 2:21 PM 03/03/20

## 2020-04-02 ENCOUNTER — HOSPITAL ENCOUNTER (OUTPATIENT)
Dept: CT IMAGING | Age: 75
Discharge: HOME | End: 2020-04-04
Payer: COMMERCIAL

## 2020-04-02 VITALS — WEIGHT: 130 LBS | BODY MASS INDEX: 21.66 KG/M2 | HEIGHT: 65 IN

## 2020-04-02 PROCEDURE — 70450 CT HEAD/BRAIN W/O DYE: CPT

## 2020-05-04 ENCOUNTER — APPOINTMENT (OUTPATIENT)
Dept: GENERAL RADIOLOGY | Age: 75
End: 2020-05-04
Payer: COMMERCIAL

## 2020-05-04 ENCOUNTER — HOSPITAL ENCOUNTER (EMERGENCY)
Age: 75
Discharge: HOME | End: 2020-05-04
Payer: COMMERCIAL

## 2020-05-04 VITALS
OXYGEN SATURATION: 99 % | RESPIRATION RATE: 20 BRPM | SYSTOLIC BLOOD PRESSURE: 144 MMHG | HEART RATE: 75 BPM | WEIGHT: 148 LBS | DIASTOLIC BLOOD PRESSURE: 62 MMHG | BODY MASS INDEX: 24.66 KG/M2 | TEMPERATURE: 97.8 F | HEIGHT: 65 IN

## 2020-05-04 LAB
ALBUMIN SERPL-MCNC: 4.5 G/DL (ref 3.5–4.6)
ALP BLD-CCNC: 107 U/L (ref 40–130)
ALT SERPL-CCNC: 10 U/L (ref 0–33)
ANION GAP SERPL CALCULATED.3IONS-SCNC: 17 MEQ/L (ref 9–15)
APTT: 29.7 SEC (ref 24.4–36.8)
AST SERPL-CCNC: 22 U/L (ref 0–35)
ATYPICAL LYMPHOCYTE RELATIVE PERCENT: 1 %
BASOPHILS ABSOLUTE: 0 K/UL (ref 0–0.2)
BASOPHILS RELATIVE PERCENT: 0.8 %
BILIRUB SERPL-MCNC: 0.3 MG/DL (ref 0.2–0.7)
BUN BLDV-MCNC: 19 MG/DL (ref 8–23)
CALCIUM SERPL-MCNC: 8.7 MG/DL (ref 8.5–9.9)
CHLORIDE BLD-SCNC: 104 MEQ/L (ref 95–107)
CO2: 22 MEQ/L (ref 20–31)
CREAT SERPL-MCNC: 1.09 MG/DL (ref 0.5–0.9)
EKG ATRIAL RATE: 78 BPM
EKG P AXIS: 32 DEGREES
EKG P-R INTERVAL: 168 MS
EKG Q-T INTERVAL: 412 MS
EKG QRS DURATION: 88 MS
EKG QTC CALCULATION (BAZETT): 469 MS
EKG R AXIS: -47 DEGREES
EKG T AXIS: 88 DEGREES
EKG VENTRICULAR RATE: 78 BPM
EOSINOPHILS ABSOLUTE: 0 K/UL (ref 0–0.7)
EOSINOPHILS RELATIVE PERCENT: 1 %
GFR AFRICAN AMERICAN: 59.6
GFR NON-AFRICAN AMERICAN: 49.2
GLOBULIN: 2.2 G/DL (ref 2.3–3.5)
GLUCOSE BLD-MCNC: 176 MG/DL (ref 70–99)
HCT VFR BLD CALC: 39.9 % (ref 37–47)
HEMOGLOBIN: 13 G/DL (ref 12–16)
INR BLD: 1
LYMPHOCYTES ABSOLUTE: 0.4 K/UL (ref 1–4.8)
LYMPHOCYTES RELATIVE PERCENT: 11 %
MAGNESIUM: 1.5 MG/DL (ref 1.7–2.4)
MCH RBC QN AUTO: 30 PG (ref 27–31.3)
MCHC RBC AUTO-ENTMCNC: 32.6 % (ref 33–37)
MCV RBC AUTO: 92.1 FL (ref 82–100)
MONOCYTES ABSOLUTE: 0.1 K/UL (ref 0.2–0.8)
MONOCYTES RELATIVE PERCENT: 2.8 %
NEUTROPHILS ABSOLUTE: 3 K/UL (ref 1.4–6.5)
NEUTROPHILS RELATIVE PERCENT: 85 %
PDW BLD-RTO: 13.8 % (ref 11.5–14.5)
PLATELET # BLD: 157 K/UL (ref 130–400)
PLATELET SLIDE REVIEW: NORMAL
POTASSIUM SERPL-SCNC: 4.4 MEQ/L (ref 3.4–4.9)
PROTHROMBIN TIME: 13 SEC (ref 12.3–14.9)
RBC # BLD: 4.33 M/UL (ref 4.2–5.4)
RBC # BLD: NORMAL 10*6/UL
SLIDE REVIEW: ABNORMAL
SODIUM BLD-SCNC: 143 MEQ/L (ref 135–144)
TOTAL CK: 84 U/L (ref 0–170)
TOTAL PROTEIN: 6.7 G/DL (ref 6.3–8)
TROPONIN: <0.01 NG/ML (ref 0–0.01)
WBC # BLD: 3.5 K/UL (ref 4.8–10.8)

## 2020-05-04 PROCEDURE — 82550 ASSAY OF CK (CPK): CPT

## 2020-05-04 PROCEDURE — 85610 PROTHROMBIN TIME: CPT

## 2020-05-04 PROCEDURE — 93005 ELECTROCARDIOGRAM TRACING: CPT | Performed by: NURSE PRACTITIONER

## 2020-05-04 PROCEDURE — 80053 COMPREHEN METABOLIC PANEL: CPT

## 2020-05-04 PROCEDURE — 85730 THROMBOPLASTIN TIME PARTIAL: CPT

## 2020-05-04 PROCEDURE — 99285 EMERGENCY DEPT VISIT HI MDM: CPT

## 2020-05-04 PROCEDURE — 84484 ASSAY OF TROPONIN QUANT: CPT

## 2020-05-04 PROCEDURE — 83735 ASSAY OF MAGNESIUM: CPT

## 2020-05-04 PROCEDURE — 71045 X-RAY EXAM CHEST 1 VIEW: CPT

## 2020-05-04 PROCEDURE — 85025 COMPLETE CBC W/AUTO DIFF WBC: CPT

## 2020-05-04 PROCEDURE — 36415 COLL VENOUS BLD VENIPUNCTURE: CPT

## 2020-05-04 ASSESSMENT — ENCOUNTER SYMPTOMS
DIARRHEA: 0
COUGH: 0
ABDOMINAL PAIN: 0
EYE PAIN: 0
SORE THROAT: 0
PHOTOPHOBIA: 0
VOMITING: 0
SHORTNESS OF BREATH: 1
NAUSEA: 0
RHINORRHEA: 0
BACK PAIN: 0

## 2020-05-04 NOTE — ED TRIAGE NOTES
Pt a&o x4. Pt denies any sob at this time. Was given Duoneb by squad. Resp easy and even. Lungs clear bilat. Denies any CP or other pain. Skin p/w/d.  Josué Tomas NP at bedside for exam.

## 2020-05-04 NOTE — ED PROVIDER NOTES
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 She is a [address], [place]. 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 Type of Home: House Multi-level, Bed/Bath upstairs    Home Access: Stairs to enter with rails    Entrance Stairs - Number of Steps: 2    Bathroom Shower/Tub: Tub/Shower unit    Bathroom Equipment: Shower chair    Home Equipment: Rolling walker    ADL Assistance: [address]: Independent    Homemaking Responsibilities: (Pt reports that she was independent)    Ambulation Assistance: Independent    Transfer Assistance: Independent    Active : No    Additional Comments: has 2 dtrs that stop in daily    Social/Functional History            SCREENINGS             PHYSICAL EXAM    (up to 7 for level 4, 8 or more for level 5)     ED Triage Vitals [05/04/20 1314]   BP Temp Temp Source Pulse Resp SpO2 Height Weight   (!) 179/70 97.8 °F (36.6 °C) Oral 81 20 100 % 5' 5\" (1.651 m) 148 lb (67.1 kg)       Physical Exam  Vitals signs and nursing note reviewed. Constitutional:       General: She is not in acute distress. Appearance: Normal appearance. She is well-developed. She is not ill-appearing, toxic-appearing or diaphoretic. HENT:      Head: Normocephalic and atraumatic. Mouth/Throat:      Mouth: Mucous membranes are moist.   Eyes:      General: Lids are normal.      Extraocular Movements: Extraocular movements intact. Conjunctiva/sclera: Conjunctivae normal.      Pupils: Pupils are equal, round, and reactive to light. Neck:      Musculoskeletal: Normal range of motion and neck supple. Cardiovascular:      Rate and Rhythm: Normal rate and regular rhythm. continued evaluation and management      DISCHARGE MEDICATIONS:  New Prescriptions    No medications on file          (Please notethat portions of this note were completed with a voice recognition program.  Efforts were made to edit the dictations but occasionally words are mis-transcribed.)    AMY Cutler CNP (electronically signed)  Attending Emergency Physician         AMY Cutler CNP  05/04/20 5196    Attending Supervisory Note/Shared Visit   I have personally performed a face to face diagnostic evaluation on this patient. I have reviewed the mid-levels findings and agree.   History and Exam by me shows shortness of breath      Haynes Heimlich, DO  Attending Emergency Physician         Haynes Heimlich, DO  05/04/20 0381

## 2020-05-05 ENCOUNTER — CARE COORDINATION (OUTPATIENT)
Dept: CARE COORDINATION | Age: 75
End: 2020-05-05

## 2020-05-05 PROCEDURE — 93010 ELECTROCARDIOGRAM REPORT: CPT | Performed by: INTERNAL MEDICINE

## 2020-05-05 NOTE — CARE COORDINATION
Patient contacted regarding Julissa Pollock. Care Transition Nurse/ Ambulatory Care Manager contacted the family by telephone to perform post discharge assessment. Verified name and  with family as identifiers. Provided introduction to self, and explanation of the CTN/ACM role, and reason for call due to risk factors for infection and/or exposure to COVID-19. Symptoms reviewed with family who verbalized the following symptoms: no new symptoms and no worsening symptoms. Due to no new or worsening symptoms encounter was not routed to provider for escalation. Patient has following risk factors of: chronic kidney disease. CTN/ACM reviewed discharge instructions, medical action plan and red flags such as increased shortness of breath, increasing fever and signs of decompensation with family who verbalized understanding. Discussed exposure protocols and quarantine with CDC Guidelines What to do if you are sick with coronavirus disease .  Family was given an opportunity for questions and concerns. The family agrees to contact the [facility] exposure line [phone], [facility] [address]: ([phone]) and PCP office for questions related to their healthcare. CTN/ACM provided contact information for future needs. Reviewed and educated family on any new and changed medications related to discharge diagnosis     Patient/family/caregiver given information for [facility] Loop and agrees to enroll no  Patient's preferred e-mail:   Patient's preferred phone number:   Based on Loop alert triggers, patient will be contacted by nurse care manager for worsening symptoms. ACM will follow up in 14 days based on severity of symptoms and risk factors. I spoke with the daughter Juna Wakefield with whom the patient lives. She tells me that her mother is doing much better. She has not had any further issues with SOB. She will call to schedule a follow up appointment with Dr Og Hogue.   I have

## 2020-05-18 ENCOUNTER — CARE COORDINATION (OUTPATIENT)
Dept: CARE COORDINATION | Age: 75
End: 2020-05-18

## 2020-05-18 NOTE — CARE COORDINATION
Attempt to contact patient regarding 14 day follow up for ED visit and COVID monitoring. Unable to contact patient Message left regarding nature of call.   Phone number provided requesting call back

## 2020-05-19 ENCOUNTER — CARE COORDINATION (OUTPATIENT)
Dept: CARE COORDINATION | Age: 75
End: 2020-05-19

## 2020-05-19 NOTE — CARE COORDINATION
Second and final attempt to contact patient for 14 day follow up regading Ed visit and COVID monitoring. Unable to reach patient/daughter. Messages left regarding purpose of calls.   Phone number left  Episode completed/resolved

## 2021-09-12 ENCOUNTER — HOSPITAL ENCOUNTER (OUTPATIENT)
Age: 76
Setting detail: OBSERVATION
Discharge: HOME | End: 2021-09-13
Attending: INTERNAL MEDICINE | Admitting: INTERNAL MEDICINE
Payer: COMMERCIAL

## 2021-09-12 ENCOUNTER — APPOINTMENT (OUTPATIENT)
Dept: GENERAL RADIOLOGY | Age: 76
End: 2021-09-12
Payer: COMMERCIAL

## 2021-09-12 DIAGNOSIS — U07.1 COVID-19: Primary | ICD-10-CM

## 2021-09-12 PROBLEM — R11.2 INTRACTABLE NAUSEA AND VOMITING: Status: ACTIVE | Noted: 2021-09-12

## 2021-09-12 LAB
ALBUMIN SERPL-MCNC: 4 G/DL (ref 3.5–4.6)
ALP BLD-CCNC: 118 U/L (ref 40–130)
ALT SERPL-CCNC: 18 U/L (ref 0–33)
ANION GAP SERPL CALCULATED.3IONS-SCNC: 11 MEQ/L (ref 9–15)
APTT: 32.8 SEC (ref 24.4–36.8)
AST SERPL-CCNC: 34 U/L (ref 0–35)
BASOPHILS ABSOLUTE: 0 K/UL (ref 0–0.2)
BASOPHILS RELATIVE PERCENT: 0.4 %
BILIRUB SERPL-MCNC: 0.5 MG/DL (ref 0.2–0.7)
BILIRUBIN URINE: NEGATIVE
BLOOD, URINE: NEGATIVE
BUN BLDV-MCNC: 14 MG/DL (ref 8–23)
CALCIUM SERPL-MCNC: 8.2 MG/DL (ref 8.5–9.9)
CHLORIDE BLD-SCNC: 92 MEQ/L (ref 95–107)
CLARITY: CLEAR
CO2: 26 MEQ/L (ref 20–31)
COLOR: YELLOW
CREAT SERPL-MCNC: 0.81 MG/DL (ref 0.5–0.9)
EOSINOPHILS ABSOLUTE: 0 K/UL (ref 0–0.7)
EOSINOPHILS RELATIVE PERCENT: 0.3 %
GFR AFRICAN AMERICAN: >60
GFR NON-AFRICAN AMERICAN: >60
GLOBULIN: 1.9 G/DL (ref 2.3–3.5)
GLUCOSE BLD-MCNC: 132 MG/DL (ref 70–99)
GLUCOSE URINE: NEGATIVE MG/DL
HCT VFR BLD CALC: 37.2 % (ref 37–47)
HEMOGLOBIN: 12.7 G/DL (ref 12–16)
INR BLD: 1
KETONES, URINE: NEGATIVE MG/DL
LACTIC ACID: 1 MMOL/L (ref 0.5–2.2)
LEUKOCYTE ESTERASE, URINE: NEGATIVE
LYMPHOCYTES ABSOLUTE: 0.8 K/UL (ref 1–4.8)
LYMPHOCYTES RELATIVE PERCENT: 20.1 %
MAGNESIUM: 1.1 MG/DL (ref 1.7–2.4)
MCH RBC QN AUTO: 31.3 PG (ref 27–31.3)
MCHC RBC AUTO-ENTMCNC: 34.1 % (ref 33–37)
MCV RBC AUTO: 92 FL (ref 82–100)
MONOCYTES ABSOLUTE: 0.4 K/UL (ref 0.2–0.8)
MONOCYTES RELATIVE PERCENT: 11.2 %
NEUTROPHILS ABSOLUTE: 2.6 K/UL (ref 1.4–6.5)
NEUTROPHILS RELATIVE PERCENT: 68 %
NITRITE, URINE: NEGATIVE
PDW BLD-RTO: 12.5 % (ref 11.5–14.5)
PH UA: 7 (ref 5–9)
PLATELET # BLD: 121 K/UL (ref 130–400)
POTASSIUM SERPL-SCNC: 4.1 MEQ/L (ref 3.4–4.9)
PROCALCITONIN: 0.06 NG/ML (ref 0–0.15)
PROTEIN UA: NEGATIVE MG/DL
PROTHROMBIN TIME: 13.1 SEC (ref 12.3–14.9)
RBC # BLD: 4.04 M/UL (ref 4.2–5.4)
SODIUM BLD-SCNC: 129 MEQ/L (ref 135–144)
SPECIFIC GRAVITY UA: 1.01 (ref 1–1.03)
TOTAL PROTEIN: 5.9 G/DL (ref 6.3–8)
TROPONIN: <0.01 NG/ML (ref 0–0.01)
URINE REFLEX TO CULTURE: NORMAL
UROBILINOGEN, URINE: 1 E.U./DL
WBC # BLD: 3.9 K/UL (ref 4.8–10.8)

## 2021-09-12 PROCEDURE — G0378 HOSPITAL OBSERVATION PER HR: HCPCS | Performed by: INTERNAL MEDICINE

## 2021-09-12 PROCEDURE — G0378 HOSPITAL OBSERVATION PER HR: HCPCS

## 2021-09-12 PROCEDURE — 6360000002 HC RX W HCPCS: Performed by: NURSE PRACTITIONER

## 2021-09-12 PROCEDURE — 96375 TX/PRO/DX INJ NEW DRUG ADDON: CPT

## 2021-09-12 PROCEDURE — 85610 PROTHROMBIN TIME: CPT

## 2021-09-12 PROCEDURE — 81003 URINALYSIS AUTO W/O SCOPE: CPT

## 2021-09-12 PROCEDURE — 96372 THER/PROPH/DIAG INJ SC/IM: CPT

## 2021-09-12 PROCEDURE — 84484 ASSAY OF TROPONIN QUANT: CPT

## 2021-09-12 PROCEDURE — 96361 HYDRATE IV INFUSION ADD-ON: CPT

## 2021-09-12 PROCEDURE — 71045 X-RAY EXAM CHEST 1 VIEW: CPT

## 2021-09-12 PROCEDURE — 84145 PROCALCITONIN (PCT): CPT

## 2021-09-12 PROCEDURE — 93005 ELECTROCARDIOGRAM TRACING: CPT | Performed by: PHYSICIAN ASSISTANT

## 2021-09-12 PROCEDURE — 2580000003 HC RX 258: Performed by: PHYSICIAN ASSISTANT

## 2021-09-12 PROCEDURE — 85730 THROMBOPLASTIN TIME PARTIAL: CPT

## 2021-09-12 PROCEDURE — 99285 EMERGENCY DEPT VISIT HI MDM: CPT

## 2021-09-12 PROCEDURE — 36415 COLL VENOUS BLD VENIPUNCTURE: CPT

## 2021-09-12 PROCEDURE — 85025 COMPLETE CBC W/AUTO DIFF WBC: CPT

## 2021-09-12 PROCEDURE — 2580000003 HC RX 258: Performed by: NURSE PRACTITIONER

## 2021-09-12 PROCEDURE — 96365 THER/PROPH/DIAG IV INF INIT: CPT

## 2021-09-12 PROCEDURE — 83605 ASSAY OF LACTIC ACID: CPT

## 2021-09-12 PROCEDURE — 96374 THER/PROPH/DIAG INJ IV PUSH: CPT

## 2021-09-12 PROCEDURE — 83735 ASSAY OF MAGNESIUM: CPT

## 2021-09-12 PROCEDURE — 2500000003 HC RX 250 WO HCPCS: Performed by: NURSE PRACTITIONER

## 2021-09-12 PROCEDURE — 80053 COMPREHEN METABOLIC PANEL: CPT

## 2021-09-12 PROCEDURE — 6360000002 HC RX W HCPCS: Performed by: PHYSICIAN ASSISTANT

## 2021-09-12 RX ORDER — ACETAMINOPHEN 325 MG/1
650 TABLET ORAL EVERY 6 HOURS PRN
Status: DISCONTINUED | OUTPATIENT
Start: 2021-09-12 | End: 2021-09-13 | Stop reason: HOSPADM

## 2021-09-12 RX ORDER — SODIUM CHLORIDE 0.9 % (FLUSH) 0.9 %
5-40 SYRINGE (ML) INJECTION EVERY 12 HOURS SCHEDULED
Status: DISCONTINUED | OUTPATIENT
Start: 2021-09-12 | End: 2021-09-13 | Stop reason: HOSPADM

## 2021-09-12 RX ORDER — MAGNESIUM SULFATE IN WATER 40 MG/ML
2000 INJECTION, SOLUTION INTRAVENOUS PRN
Status: DISCONTINUED | OUTPATIENT
Start: 2021-09-12 | End: 2021-09-13 | Stop reason: HOSPADM

## 2021-09-12 RX ORDER — ACETAMINOPHEN 650 MG/1
650 SUPPOSITORY RECTAL EVERY 6 HOURS PRN
Status: DISCONTINUED | OUTPATIENT
Start: 2021-09-12 | End: 2021-09-13 | Stop reason: HOSPADM

## 2021-09-12 RX ORDER — POLYETHYLENE GLYCOL 3350 17 G/17G
17 POWDER, FOR SOLUTION ORAL DAILY PRN
Status: DISCONTINUED | OUTPATIENT
Start: 2021-09-12 | End: 2021-09-13 | Stop reason: HOSPADM

## 2021-09-12 RX ORDER — ONDANSETRON 2 MG/ML
4 INJECTION INTRAMUSCULAR; INTRAVENOUS ONCE
Status: COMPLETED | OUTPATIENT
Start: 2021-09-12 | End: 2021-09-12

## 2021-09-12 RX ORDER — 0.9 % SODIUM CHLORIDE 0.9 %
1000 INTRAVENOUS SOLUTION INTRAVENOUS ONCE
Status: COMPLETED | OUTPATIENT
Start: 2021-09-12 | End: 2021-09-12

## 2021-09-12 RX ORDER — SODIUM CHLORIDE 0.9 % (FLUSH) 0.9 %
5-40 SYRINGE (ML) INJECTION PRN
Status: DISCONTINUED | OUTPATIENT
Start: 2021-09-12 | End: 2021-09-13 | Stop reason: HOSPADM

## 2021-09-12 RX ORDER — ONDANSETRON 2 MG/ML
4 INJECTION INTRAMUSCULAR; INTRAVENOUS EVERY 6 HOURS PRN
Status: DISCONTINUED | OUTPATIENT
Start: 2021-09-12 | End: 2021-09-13 | Stop reason: HOSPADM

## 2021-09-12 RX ORDER — SODIUM CHLORIDE 9 MG/ML
25 INJECTION, SOLUTION INTRAVENOUS PRN
Status: DISCONTINUED | OUTPATIENT
Start: 2021-09-12 | End: 2021-09-13 | Stop reason: HOSPADM

## 2021-09-12 RX ORDER — LABETALOL HYDROCHLORIDE 5 MG/ML
10 INJECTION, SOLUTION INTRAVENOUS EVERY 4 HOURS PRN
Status: DISCONTINUED | OUTPATIENT
Start: 2021-09-12 | End: 2021-09-13 | Stop reason: HOSPADM

## 2021-09-12 RX ORDER — SODIUM CHLORIDE 9 MG/ML
INJECTION, SOLUTION INTRAVENOUS CONTINUOUS
Status: DISCONTINUED | OUTPATIENT
Start: 2021-09-12 | End: 2021-09-13 | Stop reason: HOSPADM

## 2021-09-12 RX ORDER — ONDANSETRON 4 MG/1
4 TABLET, ORALLY DISINTEGRATING ORAL EVERY 8 HOURS PRN
Status: DISCONTINUED | OUTPATIENT
Start: 2021-09-12 | End: 2021-09-13 | Stop reason: HOSPADM

## 2021-09-12 RX ORDER — DEXAMETHASONE SODIUM PHOSPHATE 10 MG/ML
6 INJECTION INTRAMUSCULAR; INTRAVENOUS ONCE
Status: COMPLETED | OUTPATIENT
Start: 2021-09-12 | End: 2021-09-12

## 2021-09-12 RX ADMIN — DEXAMETHASONE SODIUM PHOSPHATE 6 MG: 10 INJECTION INTRAMUSCULAR; INTRAVENOUS at 17:30

## 2021-09-12 RX ADMIN — ONDANSETRON 4 MG: 2 INJECTION INTRAMUSCULAR; INTRAVENOUS at 17:28

## 2021-09-12 RX ADMIN — SODIUM CHLORIDE, PRESERVATIVE FREE 10 ML: 5 INJECTION INTRAVENOUS at 22:15

## 2021-09-12 RX ADMIN — MAGNESIUM SULFATE HEPTAHYDRATE 2000 MG: 40 INJECTION, SOLUTION INTRAVENOUS at 23:06

## 2021-09-12 RX ADMIN — SODIUM CHLORIDE: 9 INJECTION, SOLUTION INTRAVENOUS at 22:15

## 2021-09-12 RX ADMIN — SODIUM CHLORIDE 1000 ML: 9 INJECTION, SOLUTION INTRAVENOUS at 16:29

## 2021-09-12 RX ADMIN — ENOXAPARIN SODIUM 30 MG: 30 INJECTION SUBCUTANEOUS at 22:15

## 2021-09-12 RX ADMIN — FAMOTIDINE 20 MG: 10 INJECTION, SOLUTION INTRAVENOUS at 22:15

## 2021-09-12 ASSESSMENT — ENCOUNTER SYMPTOMS
TROUBLE SWALLOWING: 0
ABDOMINAL PAIN: 0
NAUSEA: 1
EYE PAIN: 0
DIARRHEA: 1
VOMITING: 1
APNEA: 0
ALLERGIC/IMMUNOLOGIC NEGATIVE: 1
COUGH: 0
SHORTNESS OF BREATH: 0
COLOR CHANGE: 0

## 2021-09-12 NOTE — H&P
[place] [address] MEDICINE    HISTORY AND PHYSICAL EXAM    PATIENT NAME:  Klever Abad    MRN:  [id number]  SERVICE DATE:  9/12/2021   SERVICE TIME:  6:09 PM    Primary Care Physician: No primary care provider on file. SUBJECTIVE  CHIEF COMPLAINT:  N/V/D    HPI:  Klever Abad is a 78 y.o., , female who has pmhx of HTN, kidney disease and is s/p renal transplant three years ago. No other PMHC given. Presented today with 3-5 days of nausea, vomiting and diarrhea. Had fatigue and malaise. Tested positive for Covid at [facility] today. She is not vaccinated. There is no shortness of breath, CP or hypoxia. Labs and imaging were completed. She has mild hyponatremia and decreased magnesium. She was given NS bolus, zofran and dexamethasone in the ED. Given her intractable nausea and vomiting decision to admit under obs was made. Upon exam, no further emesis or diarrhea. Reports she is feeling somewhat better. Family member present with med box. States she will go home and get the actual medication bottles as patient is on immunosuppressants.      PAST MEDICAL HISTORY: HTN, kidney disease  PAST SURGICAL HISTORY: renal transplant  FAMILY HISTORY:  Non contributory  SOCIAL HISTORY:   Denies  Social History     Socioeconomic History    Marital status: Not on file     Spouse name: Not on file    Number of children: Not on file    Years of education: Not on file    Highest education level: Not on file   Occupational History    Not on file   Tobacco Use    Smoking status: Not on file   Substance and Sexual Activity    Alcohol use: Not on file    Drug use: Not on file    Sexual activity: Not on file   Other Topics Concern    Not on file   Social History Narrative    Not on file     Social Determinants of Health     Financial Resource Strain:     Difficulty of Paying Living Expenses:    Food Insecurity:     Worried About Running Out of Food in the Last Year:     [address] in the Last Year: Transportation Needs:     Lack of Transportation (Medical):  Lack of Transportation (Non-Medical):    Physical Activity:     Days of Exercise per Week:     Minutes of Exercise per Session:    Stress:     Feeling of Stress :    Social Connections:     Frequency of Communication with Friends and Family:     Frequency of Social Gatherings with Friends and Family:     Attends Confucianist Services:     Active Member of Clubs or Organizations:     Attends Club or Organization Meetings:     Marital Status:    Intimate Partner Violence:     Fear of Current or Ex-Partner:     Emotionally Abused:     Physically Abused:     Sexually Abused:      MEDICATIONS:   Prior to Admission medications    Not on File       ALLERGIES: Patient has no known allergies. REVIEW OF SYSTEM:   Review of Systems   Constitutional: Positive for fatigue. Respiratory: Negative for cough and shortness of breath. Cardiovascular: Negative for chest pain. Gastrointestinal: Positive for diarrhea, nausea and vomiting. Musculoskeletal: Positive for myalgias. Neurological: Positive for weakness and headaches. All other systems reviewed and are negative. OBJECTIVE  PHYSICAL EXAM:   Physical Exam  Constitutional:       General: She is not in acute distress. Appearance: She is not toxic-appearing. HENT:      Head: Normocephalic and atraumatic. Right Ear: External ear normal.      Left Ear: External ear normal.      Nose: Nose normal.      Mouth/Throat:      Pharynx: Oropharynx is clear. Eyes:      Conjunctiva/sclera: Conjunctivae normal.   Cardiovascular:      Rate and Rhythm: Normal rate and regular rhythm. Pulses: Normal pulses. Pulmonary:      Effort: Pulmonary effort is normal. No respiratory distress. Breath sounds: Normal breath sounds. No stridor. No wheezing, rhonchi or rales. Abdominal:      General: Bowel sounds are normal. There is no distension. Palpations: Abdomen is soft.  There is no mass. Tenderness: There is no abdominal tenderness. There is no guarding. Musculoskeletal:         General: Normal range of motion. Skin:     General: Skin is warm and dry. Capillary Refill: Capillary refill takes less than 2 seconds. Comments: L arm fistula   Neurological:      General: No focal deficit present. Mental Status: She is alert and oriented to person, place, and time. Psychiatric:         Mood and Affect: Mood normal.        BP (!) 174/82   Pulse 78   Temp 99.5 °F (37.5 °C) (Oral)   Resp 16   Ht 5' 5\" (1.651 m)   Wt 155 lb (70.3 kg)   SpO2 100%   BMI 25.79 kg/m²      Vitals:    09/12/21 1633 09/12/21 1730   BP: (!) 169/78 (!) 174/82   Pulse: 72 78   Resp: 16 16   Temp: 99.5 °F (37.5 °C)    TempSrc: Oral    SpO2: 95% 100%   Weight: 155 lb (70.3 kg)    Height: 5' 5\" (1.651 m)        DATA:     Diagnostic tests reviewed for today's visit:    Most recent labs and imaging results reviewed.      LABS:    Recent Results (from the past 24 hour(s))   Comprehensive Metabolic Panel    Collection Time: 09/12/21  4:15 PM   Result Value Ref Range    Sodium 129 (L) 135 - 144 mEq/L    Potassium 4.1 3.4 - 4.9 mEq/L    Chloride 92 (L) 95 - 107 mEq/L    CO2 26 20 - 31 mEq/L    Anion Gap 11 9 - 15 mEq/L    Glucose 132 (H) 70 - 99 mg/dL    BUN 14 8 - 23 mg/dL    CREATININE 0.81 0.50 - 0.90 mg/dL    GFR Non-African American >60.0 >60    GFR  >60.0 >60    Calcium 8.2 (L) 8.5 - 9.9 mg/dL    Total Protein 5.9 (L) 6.3 - 8.0 g/dL    Albumin 4.0 3.5 - 4.6 g/dL    Total Bilirubin 0.5 0.2 - 0.7 mg/dL    Alkaline Phosphatase 118 40 - 130 U/L    ALT 18 0 - 33 U/L    AST 34 0 - 35 U/L    Globulin 1.9 (L) 2.3 - 3.5 g/dL   CBC Auto Differential    Collection Time: 09/12/21  4:15 PM   Result Value Ref Range    WBC 3.9 (L) 4.8 - 10.8 K/uL    RBC 4.04 (L) 4.20 - 5.40 M/uL    Hemoglobin 12.7 12.0 - 16.0 g/dL    Hematocrit 37.2 37.0 - 47.0 %    MCV 92.0 82.0 - 100.0 fL    MCH 31.3 27.0 - 31.3 pg    MCHC 34.1 33.0 - 37.0 %    RDW 12.5 11.5 - 14.5 %    Platelets 970 (L) 166 - 400 K/uL   Troponin    Collection Time: 09/12/21  4:15 PM   Result Value Ref Range    Troponin <0.010 0.000 - 0.010 ng/mL   Magnesium    Collection Time: 09/12/21  4:15 PM   Result Value Ref Range    Magnesium 1.1 (L) 1.7 - 2.4 mg/dL   Lactic Acid, Plasma    Collection Time: 09/12/21  4:15 PM   Result Value Ref Range    Lactic Acid 1.0 0.5 - 2.2 mmol/L   PROCALCITONIN    Collection Time: 09/12/21  4:15 PM   Result Value Ref Range    Procalcitonin 0.06 0.00 - 0.15 ng/mL   Protime-INR    Collection Time: 09/12/21  4:15 PM   Result Value Ref Range    Protime 13.1 12.3 - 14.9 sec    INR 1.0    APTT    Collection Time: 09/12/21  4:15 PM   Result Value Ref Range    aPTT 32.8 24.4 - 36.8 sec   EKG 12 Lead - Chest Pain    Collection Time: 09/12/21  4:24 PM   Result Value Ref Range    Ventricular Rate 77 BPM    Atrial Rate 77 BPM    P-R Interval 176 ms    QRS Duration 84 ms    Q-T Interval 414 ms    QTc Calculation (Bazett) 468 ms    P Axis 15 degrees    R Axis -48 degrees    T Axis 87 degrees       IMAGING:  No results found. VTE Prophylaxis: enoxaparin    ASSESSMENT AND PLAN    Intractable N//V/D in setting of Covid 19: IVF, electrolyte replacement. Antiemetics. Labs in am.  No indication for steroids as she is not hypoxic. Continue supportive care. UA pending. Thrombocytopenia (mild): Repeat CBC in AM. Will discontinue DVT proph SQ if/when platelets drop below 100K and switch to SCDs only. HTN: Review and resume home medications once verified. Labetalol PRN    S/p renal transplant. Resume home regimen once medications are verified with pharmacy.     Plan of care discussed with: patient and family    SIGNATURE: AMY De Leon NP  DATE: September 12, 2021  TIME: 6:09 PM   Tessa Higginbotham MD  - supervising physician

## 2021-09-12 NOTE — ED NOTES
Bed: 30  Expected date: 9/12/21  Expected time:   Means of arrival:   Comments:     Eduardo Diehl RN  09/12/21 1747

## 2021-09-12 NOTE — ED NOTES
Home medications being picked up by Jaya Logan from [facility] [address], [address] [facility]  09/12/21 Augustine Turcios 211, RN  09/12/21 1217

## 2021-09-12 NOTE — ED PROVIDER NOTES
[address] [facility]  EMERGENCY DEPARTMENT ENCOUNTER      Pt Name: Kassidy Astorga  MRN: [id number]  [place] 1945  Date of evaluation: 9/12/2021  Provider: Brandy Townsend PA-C    CHIEF COMPLAINT       Chief Complaint   Patient presents with    Shortness of Breath     SOB, fatigue, N/V x5 days; COVID +         HISTORY OF PRESENT ILLNESS   (Location/Symptom, Timing/Onset, Context/Setting, Quality, Duration, Modifying Factors, Severity)  Note limiting factors. Kassidy Astorga is a 78 y.o. female who presents to the emergency department with complaints of cough, chest congestion, shortness of breath at rest, generalized malaise and fatigue, nausea, vomiting and diarrhea. Patient states that the symptoms began approximately 5 days ago. Patient went to an outpatient testing center and did test positive for Covid today. Patient states that she has had decreased oral intake and was not able to keep her meds down this morning due to vomiting. Patient does states the diarrhea did stop. Patient also complains of a generalized headache. Patient denies any chest pain. HPI    Nursing Notes were reviewed. REVIEW OF SYSTEMS    (2-9 systems for level 4, 10 or more for level 5)     Review of Systems   Constitutional: Positive for appetite change, chills, fatigue and fever. Negative for diaphoresis. HENT: Positive for congestion. Negative for hearing loss and trouble swallowing. Eyes: Negative for pain. Respiratory: Negative for apnea and shortness of breath. Cardiovascular: Negative for chest pain. Gastrointestinal: Positive for diarrhea, nausea and vomiting. Negative for abdominal pain. Endocrine: Negative. Genitourinary: Negative for hematuria. Musculoskeletal: Positive for myalgias. Negative for neck pain and neck stiffness. Skin: Negative for color change. Allergic/Immunologic: Negative. Neurological: Positive for weakness and headaches. Negative for dizziness and numbness. Hematological: Negative. Psychiatric/Behavioral: Negative. All other systems reviewed and are negative. Except as noted above the remainder of the review of systems was reviewed and negative. PAST MEDICAL HISTORY   No past medical history on file. SURGICAL HISTORY     No past surgical history on file. CURRENT MEDICATIONS       Previous Medications    No medications on file       ALLERGIES     Patient has no known allergies. FAMILY HISTORY     No family history on file. SOCIAL HISTORY       Social History     Socioeconomic History    Marital status:      Spouse name: Not on file    Number of children: Not on file    Years of education: Not on file    Highest education level: Not on file   Occupational History    Not on file   Tobacco Use    Smoking status: Not on file   Substance and Sexual Activity    Alcohol use: Not on file    Drug use: Not on file    Sexual activity: Not on file   Other Topics Concern    Not on file   Social History Narrative    Not on file     Social Determinants of Health     Financial Resource Strain:     Difficulty of Paying Living Expenses:    Food Insecurity:     Worried About Running Out of Food in the Last Year:     [address] in the Last Year:    Transportation Needs:     Lack of Transportation (Medical):      Lack of Transportation (Non-Medical):    Physical Activity:     Days of Exercise per Week:     Minutes of Exercise per Session:    Stress:     Feeling of Stress :    Social Connections:     Frequency of Communication with Friends and Family:     Frequency of Social Gatherings with Friends and Family:     Attends Zoroastrian Services:     Active Member of Clubs or Organizations:     Attends Club or Organization Meetings:     Marital Status:    Intimate Partner Violence:     Fear of Current or Ex-Partner:     Emotionally Abused:     Physically Abused:     Sexually Abused:        SCREENINGS PHYSICAL EXAM    (up to 7 for level 4, 8 or more for level 5)     ED Triage Vitals [09/12/21 1633]   BP Temp Temp Source Pulse Resp SpO2 Height Weight   (!) 169/78 99.5 °F (37.5 °C) Oral 72 16 95 % 5' 5\" (1.651 m) 155 lb (70.3 kg)       Physical Exam  Vitals and nursing note reviewed. Constitutional:       General: She is not in acute distress. Appearance: She is well-developed. She is not diaphoretic. HENT:      Head: Normocephalic and atraumatic. Mouth/Throat:      Pharynx: No oropharyngeal exudate. Eyes:      General: No scleral icterus. Conjunctiva/sclera: Conjunctivae normal.      Pupils: Pupils are equal, round, and reactive to light. Neck:      Trachea: No tracheal deviation. Cardiovascular:      Rate and Rhythm: Normal rate. Heart sounds: Normal heart sounds. Pulmonary:      Effort: Pulmonary effort is normal. No respiratory distress. Breath sounds: Normal breath sounds. Abdominal:      General: Bowel sounds are normal. There is no distension. Palpations: Abdomen is soft. Musculoskeletal:         General: Normal range of motion. Cervical back: Normal range of motion and neck supple. Skin:     General: Skin is warm and dry. Findings: No erythema or rash. Neurological:      Mental Status: She is alert and oriented to person, place, and time. Cranial Nerves: No cranial nerve deficit. Motor: No abnormal muscle tone. Psychiatric:         Behavior: Behavior normal.         Thought Content:  Thought content normal.         Judgment: Judgment normal.         DIAGNOSTIC RESULTS     EKG: All EKG's are interpreted by the Emergency Department Physician who either signs or Co-signs this chart in the absence of a cardiologist.    Normal sinus rhythm, rate 77 bpm, left axis deviation    RADIOLOGY:   Non-plain film images such as CT, Ultrasound and MRI are read by the radiologist. Plain radiographic images are visualized and preliminarily interpreted by the emergency physician with the below findings:    Negative    Interpretation per the Radiologist below, if available at the time of this note:    XR CHEST PORTABLE    (Results Pending)         ED BEDSIDE ULTRASOUND:   Performed by ED Physician - none    LABS:  Labs Reviewed   COMPREHENSIVE METABOLIC PANEL - Abnormal; Notable for the following components:       Result Value    Sodium 129 (*)     Chloride 92 (*)     Glucose 132 (*)     Calcium 8.2 (*)     Total Protein 5.9 (*)     Globulin 1.9 (*)     All other components within normal limits   CBC WITH AUTO DIFFERENTIAL - Abnormal; Notable for the following components:    WBC 3.9 (*)     RBC 4.04 (*)     Platelets 058 (*)     Lymphocytes Absolute 0.8 (*)     All other components within normal limits   MAGNESIUM - Abnormal; Notable for the following components:    Magnesium 1.1 (*)     All other components within normal limits   URINE RT REFLEX TO CULTURE   TROPONIN   LACTIC ACID, PLASMA   PROCALCITONIN   PROTIME-INR   APTT       All other labs were within normal range or not returned as of this dictation. EMERGENCY DEPARTMENT COURSE and DIFFERENTIAL DIAGNOSIS/MDM:   Vitals:    Vitals:    09/12/21 1633 09/12/21 1730   BP: (!) 169/78 (!) 174/82   Pulse: 72 78   Resp: 16 16   Temp: 99.5 °F (37.5 °C)    TempSrc: Oral    SpO2: 95% 100%   Weight: 155 lb (70.3 kg)    Height: 5' 5\" (1.651 m)            MDM      REASSESSMENT      Patient with a history of kidney transplant presents emergency department with complaints of Covid positive symptoms. Patient has a hypomagnesemia today with an unremarkable chest x-ray.   Given patient's immunosuppression meds she will be admitted in the hospital for further evaluation and care        CONSULTS:  None    PROCEDURES:  Unless otherwise noted below, none     Procedures      FINAL IMPRESSION      1. COVID-19          DISPOSITION/PLAN   DISPOSITION Admitted 09/12/2021 06:08:53 PM      PATIENT REFERRED TO:  No follow-up provider specified. DISCHARGE MEDICATIONS:  New Prescriptions    No medications on file     Controlled Substances Monitoring:     No flowsheet data found.     (Please note that portions of this note were completed with a voice recognition program.  Efforts were made to edit the dictations but occasionally words are mis-transcribed.)    Arin Cano PA-C (electronically signed)  Attending Emergency Physician            Arin Cano PA-C  09/12/21 7668

## 2021-09-12 NOTE — ED TRIAGE NOTES
Pt presents to ED via EMS c/o SOB, fatigue, N/V/D, x5 days. spO2 95% RA. Respirations even and unlabored. Pt went to drive-thru COVID testing today.  COVID +

## 2021-09-13 VITALS
OXYGEN SATURATION: 95 % | WEIGHT: 155 LBS | RESPIRATION RATE: 18 BRPM | TEMPERATURE: 98.1 F | BODY MASS INDEX: 25.83 KG/M2 | HEART RATE: 59 BPM | HEIGHT: 65 IN | SYSTOLIC BLOOD PRESSURE: 140 MMHG | DIASTOLIC BLOOD PRESSURE: 69 MMHG

## 2021-09-13 LAB
ALBUMIN SERPL-MCNC: 3.4 G/DL (ref 3.5–4.6)
ALP BLD-CCNC: 109 U/L (ref 40–130)
ALT SERPL-CCNC: 15 U/L (ref 0–33)
ANION GAP SERPL CALCULATED.3IONS-SCNC: 14 MEQ/L (ref 9–15)
AST SERPL-CCNC: 27 U/L (ref 0–35)
BASOPHILS ABSOLUTE: 0 K/UL (ref 0–0.2)
BASOPHILS RELATIVE PERCENT: 0.1 %
BILIRUB SERPL-MCNC: 0.3 MG/DL (ref 0.2–0.7)
BUN BLDV-MCNC: 13 MG/DL (ref 8–23)
CALCIUM SERPL-MCNC: 7.6 MG/DL (ref 8.5–9.9)
CHLORIDE BLD-SCNC: 99 MEQ/L (ref 95–107)
CO2: 22 MEQ/L (ref 20–31)
CREAT SERPL-MCNC: 0.72 MG/DL (ref 0.5–0.9)
EKG ATRIAL RATE: 77 BPM
EKG P AXIS: 15 DEGREES
EKG P-R INTERVAL: 176 MS
EKG Q-T INTERVAL: 414 MS
EKG QRS DURATION: 84 MS
EKG QTC CALCULATION (BAZETT): 468 MS
EKG R AXIS: -48 DEGREES
EKG T AXIS: 87 DEGREES
EKG VENTRICULAR RATE: 77 BPM
EOSINOPHILS ABSOLUTE: 0 K/UL (ref 0–0.7)
EOSINOPHILS RELATIVE PERCENT: 0 %
GFR AFRICAN AMERICAN: >60
GFR NON-AFRICAN AMERICAN: >60
GLOBULIN: 1.8 G/DL (ref 2.3–3.5)
GLUCOSE BLD-MCNC: 149 MG/DL (ref 70–99)
HCT VFR BLD CALC: 37.8 % (ref 37–47)
HEMOGLOBIN: 13 G/DL (ref 12–16)
LYMPHOCYTES ABSOLUTE: 0.2 K/UL (ref 1–4.8)
LYMPHOCYTES RELATIVE PERCENT: 12 %
MCH RBC QN AUTO: 32.2 PG (ref 27–31.3)
MCHC RBC AUTO-ENTMCNC: 34.3 % (ref 33–37)
MCV RBC AUTO: 93.8 FL (ref 82–100)
MONOCYTES ABSOLUTE: 0.1 K/UL (ref 0.2–0.8)
MONOCYTES RELATIVE PERCENT: 5.8 %
NEUTROPHILS ABSOLUTE: 1.7 K/UL (ref 1.4–6.5)
NEUTROPHILS RELATIVE PERCENT: 83 %
OVALOCYTES: ABNORMAL
PDW BLD-RTO: 12.8 % (ref 11.5–14.5)
PLATELET # BLD: 113 K/UL (ref 130–400)
PLATELET SLIDE REVIEW: ABNORMAL
POTASSIUM REFLEX MAGNESIUM: 4.9 MEQ/L (ref 3.4–4.9)
RBC # BLD: 4.03 M/UL (ref 4.2–5.4)
SARS-COV-2, NAAT: DETECTED
SODIUM BLD-SCNC: 135 MEQ/L (ref 135–144)
TOTAL PROTEIN: 5.2 G/DL (ref 6.3–8)
WBC # BLD: 2 K/UL (ref 4.8–10.8)

## 2021-09-13 PROCEDURE — 96372 THER/PROPH/DIAG INJ SC/IM: CPT

## 2021-09-13 PROCEDURE — 2700000000 HC OXYGEN THERAPY PER DAY

## 2021-09-13 PROCEDURE — 87635 SARS-COV-2 COVID-19 AMP PRB: CPT

## 2021-09-13 PROCEDURE — 93010 ELECTROCARDIOGRAM REPORT: CPT | Performed by: INTERNAL MEDICINE

## 2021-09-13 PROCEDURE — 85025 COMPLETE CBC W/AUTO DIFF WBC: CPT

## 2021-09-13 PROCEDURE — 6360000002 HC RX W HCPCS: Performed by: NURSE PRACTITIONER

## 2021-09-13 PROCEDURE — 80053 COMPREHEN METABOLIC PANEL: CPT

## 2021-09-13 PROCEDURE — 96366 THER/PROPH/DIAG IV INF ADDON: CPT

## 2021-09-13 PROCEDURE — 6360000002 HC RX W HCPCS: Performed by: INTERNAL MEDICINE

## 2021-09-13 PROCEDURE — 2500000003 HC RX 250 WO HCPCS: Performed by: NURSE PRACTITIONER

## 2021-09-13 PROCEDURE — 6370000000 HC RX 637 (ALT 250 FOR IP): Performed by: INTERNAL MEDICINE

## 2021-09-13 PROCEDURE — 36415 COLL VENOUS BLD VENIPUNCTURE: CPT

## 2021-09-13 PROCEDURE — 96376 TX/PRO/DX INJ SAME DRUG ADON: CPT

## 2021-09-13 PROCEDURE — G0378 HOSPITAL OBSERVATION PER HR: HCPCS

## 2021-09-13 RX ORDER — ATORVASTATIN CALCIUM 20 MG/1
20 TABLET, FILM COATED ORAL NIGHTLY
Status: DISCONTINUED | OUTPATIENT
Start: 2021-09-13 | End: 2021-09-13 | Stop reason: HOSPADM

## 2021-09-13 RX ORDER — MYCOPHENOLATE MOFETIL 250 MG/1
500 CAPSULE ORAL 2 TIMES DAILY
COMMUNITY

## 2021-09-13 RX ORDER — SULFAMETHOXAZOLE AND TRIMETHOPRIM 400; 80 MG/1; MG/1
1 TABLET ORAL DAILY
Status: DISCONTINUED | OUTPATIENT
Start: 2021-09-13 | End: 2021-09-13 | Stop reason: HOSPADM

## 2021-09-13 RX ORDER — CINACALCET 30 MG/1
60 TABLET, FILM COATED ORAL DAILY
Status: DISCONTINUED | OUTPATIENT
Start: 2021-09-13 | End: 2021-09-13 | Stop reason: HOSPADM

## 2021-09-13 RX ORDER — TACROLIMUS 1 MG/1
2 CAPSULE ORAL
COMMUNITY

## 2021-09-13 RX ORDER — PREDNISONE 1 MG/1
5 TABLET ORAL DAILY
COMMUNITY

## 2021-09-13 RX ORDER — PREDNISONE 1 MG/1
5 TABLET ORAL DAILY
Status: DISCONTINUED | OUTPATIENT
Start: 2021-09-13 | End: 2021-09-13 | Stop reason: HOSPADM

## 2021-09-13 RX ORDER — SULFAMETHOXAZOLE AND TRIMETHOPRIM 400; 80 MG/1; MG/1
1 TABLET ORAL DAILY
COMMUNITY

## 2021-09-13 RX ORDER — OMEPRAZOLE 20 MG/1
40 CAPSULE, DELAYED RELEASE ORAL DAILY
COMMUNITY

## 2021-09-13 RX ORDER — MAGNESIUM OXIDE 400 MG/1
400 TABLET ORAL DAILY
COMMUNITY

## 2021-09-13 RX ORDER — TACROLIMUS 1 MG/1
1 CAPSULE ORAL NIGHTLY
Status: DISCONTINUED | OUTPATIENT
Start: 2021-09-13 | End: 2021-09-13 | Stop reason: HOSPADM

## 2021-09-13 RX ORDER — ATORVASTATIN CALCIUM 20 MG/1
20 TABLET, FILM COATED ORAL DAILY
COMMUNITY

## 2021-09-13 RX ORDER — LANOLIN ALCOHOL/MO/W.PET/CERES
400 CREAM (GRAM) TOPICAL DAILY
Status: DISCONTINUED | OUTPATIENT
Start: 2021-09-13 | End: 2021-09-13 | Stop reason: HOSPADM

## 2021-09-13 RX ORDER — ESCITALOPRAM OXALATE 10 MG/1
10 TABLET ORAL DAILY
Status: DISCONTINUED | OUTPATIENT
Start: 2021-09-13 | End: 2021-09-13 | Stop reason: HOSPADM

## 2021-09-13 RX ORDER — ESCITALOPRAM OXALATE 10 MG/1
10 TABLET ORAL DAILY
COMMUNITY

## 2021-09-13 RX ORDER — MYCOPHENOLATE MOFETIL 250 MG/1
500 CAPSULE ORAL 2 TIMES DAILY
Status: DISCONTINUED | OUTPATIENT
Start: 2021-09-13 | End: 2021-09-13 | Stop reason: HOSPADM

## 2021-09-13 RX ORDER — BUSPIRONE HYDROCHLORIDE 5 MG/1
5 TABLET ORAL 3 TIMES DAILY
Status: DISCONTINUED | OUTPATIENT
Start: 2021-09-13 | End: 2021-09-13 | Stop reason: HOSPADM

## 2021-09-13 RX ORDER — BUSPIRONE HYDROCHLORIDE 5 MG/1
5 TABLET ORAL 3 TIMES DAILY
COMMUNITY

## 2021-09-13 RX ORDER — PANTOPRAZOLE SODIUM 40 MG/1
40 TABLET, DELAYED RELEASE ORAL
Status: DISCONTINUED | OUTPATIENT
Start: 2021-09-14 | End: 2021-09-13 | Stop reason: HOSPADM

## 2021-09-13 RX ORDER — CINACALCET 30 MG/1
60 TABLET, FILM COATED ORAL DAILY
COMMUNITY

## 2021-09-13 RX ORDER — TACROLIMUS 1 MG/1
2 CAPSULE ORAL
Status: DISCONTINUED | OUTPATIENT
Start: 2021-09-14 | End: 2021-09-13 | Stop reason: HOSPADM

## 2021-09-13 RX ORDER — TACROLIMUS 1 MG/1
1 CAPSULE ORAL NIGHTLY
COMMUNITY

## 2021-09-13 RX ADMIN — MYCOPHENOLATE MOFETIL 500 MG: 250 CAPSULE ORAL at 11:32

## 2021-09-13 RX ADMIN — Medication 400 MG: at 11:32

## 2021-09-13 RX ADMIN — PREDNISONE 5 MG: 5 TABLET ORAL at 11:32

## 2021-09-13 RX ADMIN — METOPROLOL TARTRATE 25 MG: 25 TABLET, FILM COATED ORAL at 11:32

## 2021-09-13 RX ADMIN — MAGNESIUM SULFATE HEPTAHYDRATE 2000 MG: 40 INJECTION, SOLUTION INTRAVENOUS at 01:13

## 2021-09-13 RX ADMIN — ESCITALOPRAM OXALATE 10 MG: 10 TABLET ORAL at 11:32

## 2021-09-13 RX ADMIN — FAMOTIDINE 20 MG: 10 INJECTION, SOLUTION INTRAVENOUS at 08:46

## 2021-09-13 RX ADMIN — CINACALCET 60 MG: 30 TABLET, FILM COATED ORAL at 11:36

## 2021-09-13 RX ADMIN — SULFAMETHOXAZOLE AND TRIMETHOPRIM 1 TABLET: 400; 80 TABLET ORAL at 11:36

## 2021-09-13 RX ADMIN — ENOXAPARIN SODIUM 30 MG: 30 INJECTION SUBCUTANEOUS at 08:46

## 2021-09-13 NOTE — PROGRESS NOTES
Physical Therapy Missed Treatment   Facility/Department: [facility] MED SURG X827/E568-16    NAME: Zeinab Champion    : 1945 (78 y.o.)  MRN: [id number]    Account: [de-identified]  Gender: female      PT referral received. Chart reviewed and case discussed with RN. Pt has been up and indep in room mobility. Pts discharge is anticipated later this date as well. Pt observed independently returning from the bathroom walking indep thru the window. Pt does not demonstrate need for PT evaluation based on this information.            Amol Dixon, PT, 21 at 2:00 PM

## 2021-09-13 NOTE — PROGRESS NOTES
Hospitalist Progress Note      PCP: No primary care provider on file. Date of Admission: 9/12/2021    Chief Complaint:  No acute events, afebrile, stable HD, on RA, no vomiting or diarrhea per nursing staff    Medications:  Reviewed    Infusion Medications    sodium chloride      sodium chloride 75 mL/hr at 09/12/21 2215     Scheduled Medications    sodium chloride flush  5-40 mL IntraVENous 2 times per day    enoxaparin  30 mg SubCUTAneous BID    famotidine (PEPCID) injection  20 mg IntraVENous BID     PRN Meds: sodium chloride flush, sodium chloride, ondansetron **OR** ondansetron, polyethylene glycol, acetaminophen **OR** acetaminophen, magnesium sulfate, labetalol    No intake or output data in the 24 hours ending 09/13/21 1049    Exam:    BP (!) 143/118   Pulse 73   Temp 98.1 °F (36.7 °C) (Oral)   Resp 18   Ht 5' 5\" (1.651 m)   Wt 155 lb (70.3 kg)   SpO2 96%   BMI 25.79 kg/m²     General appearance: awake, cooperative. Respiratory:  clear to auscultation, bilaterally   Cardiovascular: Regular rate and rhythm, S1/S2  Abdomen: Soft, active bowel sounds. Musculoskeletal: No edema bilaterally. Labs:   Recent Labs     09/12/21  1615 09/13/21  0559   WBC 3.9* 2.0*   HGB 12.7 13.0   HCT 37.2 37.8   * 113*     Recent Labs     09/12/21  1615 09/13/21  0559   * 135   K 4.1 4.9   CL 92* 99   CO2 26 22   BUN 14 13   CREATININE 0.81 0.72   CALCIUM 8.2* 7.6*     Recent Labs     09/12/21  1615 09/13/21  0559   AST 34 27   ALT 18 15   BILITOT 0.5 0.3   ALKPHOS 118 109     Recent Labs     09/12/21  1615   INR 1.0     Recent Labs     09/12/21  1615   TROPONINI <0.010       Urinalysis:      Lab Results   Component Value Date    NITRU Negative 09/12/2021    BLOODU Negative 09/12/2021    SPECGRAV 1.007 09/12/2021    GLUCOSEU Negative 09/12/2021       Radiology:  XR CHEST PORTABLE   Final Result      Bibasilar atelectasis and/or infiltrate.                     Assessment/Plan:    79 y/o female with

## 2021-09-13 NOTE — DISCHARGE SUMMARY
Hospital Medicine Discharge Summary    Saundra Rutledge  :  1945  MRN:  [id number]    Admit date:  2021  Discharge date:  2021    Admitting Physician:  Stephen Huff MD  Primary Care Physician:  No primary care provider on file. Discharge Diagnoses: Active Problems:    Intractable nausea and vomiting  Resolved Problems:    * No resolved hospital problems. *      Hospital Course:   Saundra Rutledge is a 78 y.o. female that was admitted and treated at [facility] for the following medical issues:     Nausea, vomiting and diarrhea  - in the setting of COVID infection  - on RA, no respiratory symptoms  - clinically improved    Disposition - home    Patient was seen by the following consultants while admitted to [facility]:   Consults:  None    Significant Diagnostic Studies:    XR CHEST PORTABLE    Result Date: 2021  Exam: XR CHEST PORTABLE History: Shortness of breath. Technique: AP portable view of the chest obtained. Comparison: None available Findings: Atherosclerotic calcification of the thoracic aorta. Heart size is mildly enlarged. Patchy bibasilar opacities. No pneumothorax or pleural effusion. No acute osseous abnormality. Bibasilar atelectasis and/or infiltrate.        Discharge Medications:       Rickford Speaker   Home Medication Instructions AIK:[id number]    Printed on:21 1202   Medication Information                      atorvastatin (LIPITOR) 20 MG tablet  Take 20 mg by mouth daily             busPIRone (BUSPAR) 5 MG tablet  Take 5 mg by mouth 3 times daily             cinacalcet (SENSIPAR) 30 MG tablet  Take 60 mg by mouth daily             escitalopram (LEXAPRO) 10 MG tablet  Take 10 mg by mouth daily             magnesium oxide (MAG-OX) 400 MG tablet  Take 400 mg by mouth daily             metoprolol tartrate (LOPRESSOR) 25 MG tablet  Take 25 mg by mouth 2 times daily             mycophenolate (CELLCEPT) 250 MG capsule  Take 500 mg by mouth 2 times daily             omeprazole (PRILOSEC) 20 MG delayed release capsule  Take 40 mg by mouth Daily             predniSONE (DELTASONE) 5 MG tablet  Take 5 mg by mouth daily             sulfamethoxazole-trimethoprim (BACTRIM) 400-80 MG per tablet  Take 1 tablet by mouth daily             tacrolimus (PROGRAF) 1 MG capsule  Take 2 mg by mouth every morning (before breakfast)             tacrolimus (PROGRAF) 1 MG capsule  Take 1 mg by mouth nightly                 Disposition:   Discharged to Home. Any [facility] needs that were indicated and/or required as been addressed and set up by Social Work. Condition at discharge: Pt was medically stable at the time of discharge. Activity: activity as tolerated, fall precautions. Total time taken for discharging this patient: 40 minutes. Greater than 70% of time was spent focused exclusively on this patient. Time was taken to review chart, discuss plans with consultants, reconciling medications, discussing plan answering questions with patient. Signed:  Vincent Briseno MD  9/13/2021, 12:02 PM  ----------------------------------------------------------------------------------------------------------------------    Kassidy Astorga,     Please return to ER or call 911 if you develop any significant signs or symptoms. I may not have addressed all of your medical illnesses or the abnormal blood work or imaging therefore please ask your PCP No primary care provider on file. and other out patient specialists and providers  to obtain [facility] entirely to follow up on all of the abnormal labs, imaging and findings that I have and have not addressed during your hospitalization. Discharging you from the hospital does not mean that your medical care ends here and now. You may still need additional work up, investigation, monitoring, and treatment to be handled from this point on by outside providers including your PCP, No primary care provider on file.  , Specialists and other healthcare providers. Please review your list of discharge medications prior to resuming medications you might still have at home, as the medications you need to be taking, dosages or how often you must take them may have changed. For medication questions, contact your retail pharmacy and your PCP, No primary care provider on file. Juliet Clayton I STRONGLY RECOMMEND that you follow up with No primary care provider on file. within 3 to 5 days for a post hospitalization evaluation. This specific office visit is covered by your insurance, and is not the same as your annual doctor visit/ check up. This office visit is important, as it may prevent need for repeat and/or future hospitalizations. **    Your medical team at [facility] appreciates the opportunity to work with you to get well!     Sincerely,  Brandt Costa MD

## 2021-09-14 ENCOUNTER — CARE COORDINATION (OUTPATIENT)
Dept: CASE MANAGEMENT | Age: 76
End: 2021-09-14

## 2021-09-14 NOTE — CARE COORDINATION
Patient gave permission for daughter to talk- daughter Alan Greenwood, reports the patient continues to c/o feeling tired, cough, sob on exertion and body aches. Denies worsening symptoms. States she is eating and staying well hydrated. Patient contacted regarding COVID-19 risk, exposure, diagnosis, pulse oximeter ordered at discharge and monoclonal antibody infusion follow up. Discussed COVID-19 related testing which was available at this time. Test results were positive. Patient informed of results, if available? Yes. Care Transition Nurse contacted the family by telephone to perform post discharge assessment. Call within 2 business days of discharge: Yes. Verified name and  with family as identifiers. Provided introduction to self, and explanation of the CTN/ACM role, and reason for call due to risk factors for infection and/or exposure to COVID-19. Symptoms reviewed with family who verbalized the following symptoms: fatigue, cough, shortness of breath, no new symptoms and no worsening symptoms. Due to no new or worsening symptoms encounter was not routed to provider for escalation. Discussed follow-up appointments. If no appointment was previously scheduled, appointment scheduling offered: Yes. [facility] follow up appointment(s): No future appointments. Non-[facility] follow up appointment(s):     Non-face-to-face services provided:  Obtained and reviewed discharge summary and/or continuity of care documents     Advance Care Planning:   Does patient have an Advance Directive:  not on file. Advance Care Planning   Healthcare Decision Maker:      Educated patient about risk for severe COVID-19 due to risk factors according to CDC guidelines. CTN reviewed discharge instructions, medical action plan and red flag symptoms with the family who verbalized understanding. Discussed COVID vaccination status: No. Education provided on COVID-19 vaccination as appropriate.  Discussed exposure protocols and quarantine with CDC Guidelines. Family was given an opportunity to verbalize any questions and concerns and agrees to contact CTN or health care provider for questions related to their healthcare. Reviewed and educated family on any new and changed medications related to discharge diagnosis     Was patient discharged with a pulse oximeter? No Discussed and confirmed pulse oximeter discharge instructions and when to notify provider or seek emergency care. CTN provided contact information. Plan for follow-up call in 5-7 days based on severity of symptoms and risk factors.

## 2021-09-21 ENCOUNTER — CARE COORDINATION (OUTPATIENT)
Dept: CARE COORDINATION | Age: 76
End: 2021-09-21

## 2021-09-21 NOTE — CARE COORDINATION
[facility] Transitions Follow Up Call    2021    Patient: Xander Bauman  Patient : 1945   MRN: [id number]  Reason for Admission: -21 Intractable Nausea/Vomiting; Covid-19; CDIFF R/O  Discharge Date: 21 RARS: No data recorded     Spoke with Pts daughter Helen Ayala who reports the Pt has a wet productive cough and fatigue. She states that the Pt denies SOB, fever, chills, or flu like symptoms. Declining PCP f/u appt. No new concerns or needs. You Patient resolved from the Care Transitions episode on 21  Discussed COVID-19 related testing which was available at this time. Test results were positive. Patient informed of results, if available? Yes     Patient/family has been provided the following resources and education related to COVID-19:                         Signs, symptoms and red flags related to COVID-19            CDC exposure and quarantine guidelines            [employer] exposure contact - [phone]            Contact for their local Department of Health                 Patient currently reports that the following symptoms have improved:  fatigue, cough and no new/worsening symptoms     No further outreach scheduled with this CTN/ACM. Episode of Care resolved. Patient has this CTN/ACM contact information if future needs arise. Care Transitions Subsequent and Final Call    Subsequent and Final Calls  Care Transitions Interventions  Other Interventions: Follow Up  No future appointments.     Nathaly Gregorio LPN

## 2021-10-14 ENCOUNTER — CLINICAL DOCUMENTATION (OUTPATIENT)
Dept: OCCUPATIONAL THERAPY | Age: 76
End: 2021-10-14

## 2021-10-14 NOTE — PROGRESS NOTES
OCCUPATIONAL THERAPY DISCHARGE SUMMARY     [] [address]:     Faith Melendez  Ph: [phone]   Fax: [phone] [] [address] [facility]:  [facility] [address] [facility], [address]   Ph: [phone]   Fax: [phone]       Date: 10/14/2021    To:     Referral : Dr Alan Gomez        From: Derrick Horne OT  Patient: Zach Conley   : 1945  MRN: [id number]  Diagnosis:    Date of eval: 2020     Visit Information:                                  Patient was last seen 2/10/2020. Assessment:    Goals Current/Discharge status  Met     Pt will increase AROM of BUE from current by 10-15 degrees with sustained activity° to increase performance with I/ADL's. Unable to assess. Patient last seen 2/10/2020. [] Met  [] Partially Met  [] Not Met     Pt will increase BUE strength from current by 1/2 to 1muscle grade to aid endurance and  to increase performance with I/ADL's. Unable to assess. Patient last seen 2/10/2020. [] Met  [] Partially Met  [] Not Met     Pt will increase B  strength from current by 2-5 lbs lbs to increase performance with I/ADL's. Unable to assess. Patient last seen 2/10/2020. [] Met  [] Partially Met  [] Not Met     Pt will increase B pinch strength from current by 2-5 lbs to increase performance with I/ADL's. Unable to assess. Patient last seen 2/10/2020. [] Met  [] Partially Met  [] Not Met     Pt will increase dexterity in B hand as observed by 9 hole peg test by decreasing time from current  by 5-8 seconds seconds to increase performance with I/ADL's. Unable to assess. Patient last seen 2/10/2020. [] Met  [] Partially Met  [] Not Met     Pt will be IND with HEP following verbal instruction and demonstration and updated as Pt improves. Unable to assess. Patient last seen 2/10/2020.  [] Met  [] Partially Met  [] Not Met         Plan: D/C from OT    Thank you for referral of this patient.       Electronically signed by:  Denzel Juan OT 10/14/2021 1:53 PM

## 2023-06-19 ENCOUNTER — APPOINTMENT (OUTPATIENT)
Dept: GENERAL RADIOLOGY | Age: 78
DRG: 287 | End: 2023-06-19
Payer: COMMERCIAL

## 2023-06-19 ENCOUNTER — HOSPITAL ENCOUNTER (INPATIENT)
Age: 78
LOS: 2 days | Discharge: HOME | DRG: 287 | End: 2023-06-21
Attending: EMERGENCY MEDICINE | Admitting: INTERNAL MEDICINE
Payer: COMMERCIAL

## 2023-06-19 DIAGNOSIS — R07.9 CHEST PAIN, UNSPECIFIED TYPE: ICD-10-CM

## 2023-06-19 DIAGNOSIS — I48.91 NEW ONSET A-FIB (HCC): Primary | ICD-10-CM

## 2023-06-19 LAB
ALBUMIN SERPL-MCNC: 3.9 G/DL (ref 3.5–4.6)
ALP SERPL-CCNC: 72 U/L (ref 40–130)
ALT SERPL-CCNC: 14 U/L (ref 0–33)
ANION GAP SERPL CALCULATED.3IONS-SCNC: 14 MEQ/L (ref 9–15)
AST SERPL-CCNC: 21 U/L (ref 0–35)
BASOPHILS # BLD: 0 K/UL (ref 0–0.2)
BASOPHILS NFR BLD: 0.8 %
BILIRUB SERPL-MCNC: 0.3 MG/DL (ref 0.2–0.7)
BUN SERPL-MCNC: 14 MG/DL (ref 8–23)
CALCIUM SERPL-MCNC: 9.8 MG/DL (ref 8.5–9.9)
CHLORIDE SERPL-SCNC: 104 MEQ/L (ref 95–107)
CO2 SERPL-SCNC: 22 MEQ/L (ref 20–31)
CREAT SERPL-MCNC: 0.84 MG/DL (ref 0.5–0.9)
EOSINOPHIL # BLD: 0.1 K/UL (ref 0–0.7)
EOSINOPHIL NFR BLD: 1.2 %
ERYTHROCYTE [DISTWIDTH] IN BLOOD BY AUTOMATED COUNT: 13.4 % (ref 11.5–14.5)
GLOBULIN SER CALC-MCNC: 2.1 G/DL (ref 2.3–3.5)
GLUCOSE SERPL-MCNC: 152 MG/DL (ref 70–99)
HCT VFR BLD AUTO: 40.1 % (ref 37–47)
HGB BLD-MCNC: 13.3 G/DL (ref 12–16)
LYMPHOCYTES # BLD: 0.9 K/UL (ref 1–4.8)
LYMPHOCYTES NFR BLD: 15.5 %
MAGNESIUM SERPL-MCNC: 1.6 MG/DL (ref 1.7–2.4)
MCH RBC QN AUTO: 31.2 PG (ref 27–31.3)
MCHC RBC AUTO-ENTMCNC: 33.3 % (ref 33–37)
MCV RBC AUTO: 94 FL (ref 79.4–94.8)
MONOCYTES # BLD: 0.5 K/UL (ref 0.2–0.8)
MONOCYTES NFR BLD: 8.8 %
NEUTROPHILS # BLD: 4.1 K/UL (ref 1.4–6.5)
NEUTS SEG NFR BLD: 73.7 %
PLATELET # BLD AUTO: 144 K/UL (ref 130–400)
POTASSIUM SERPL-SCNC: 4.4 MEQ/L (ref 3.4–4.9)
PROT SERPL-MCNC: 6 G/DL (ref 6.3–8)
RBC # BLD AUTO: 4.27 M/UL (ref 4.2–5.4)
SODIUM SERPL-SCNC: 140 MEQ/L (ref 135–144)
TROPONIN T SERPL-MCNC: <0.01 NG/ML (ref 0–0.01)
WBC # BLD AUTO: 5.6 K/UL (ref 4.8–10.8)

## 2023-06-19 PROCEDURE — 2500000003 HC RX 250 WO HCPCS: Performed by: EMERGENCY MEDICINE

## 2023-06-19 PROCEDURE — 6360000002 HC RX W HCPCS: Performed by: EMERGENCY MEDICINE

## 2023-06-19 PROCEDURE — 96365 THER/PROPH/DIAG IV INF INIT: CPT

## 2023-06-19 PROCEDURE — 36415 COLL VENOUS BLD VENIPUNCTURE: CPT

## 2023-06-19 PROCEDURE — 1210000000 HC MED SURG R&B

## 2023-06-19 PROCEDURE — 85025 COMPLETE CBC W/AUTO DIFF WBC: CPT

## 2023-06-19 PROCEDURE — 71045 X-RAY EXAM CHEST 1 VIEW: CPT

## 2023-06-19 PROCEDURE — 83735 ASSAY OF MAGNESIUM: CPT

## 2023-06-19 PROCEDURE — 84484 ASSAY OF TROPONIN QUANT: CPT

## 2023-06-19 PROCEDURE — 96375 TX/PRO/DX INJ NEW DRUG ADDON: CPT

## 2023-06-19 PROCEDURE — 99285 EMERGENCY DEPT VISIT HI MDM: CPT

## 2023-06-19 PROCEDURE — 96366 THER/PROPH/DIAG IV INF ADDON: CPT

## 2023-06-19 PROCEDURE — 80053 COMPREHEN METABOLIC PANEL: CPT

## 2023-06-19 PROCEDURE — 93005 ELECTROCARDIOGRAM TRACING: CPT | Performed by: EMERGENCY MEDICINE

## 2023-06-19 RX ORDER — ACETAMINOPHEN 650 MG/1
650 SUPPOSITORY RECTAL EVERY 6 HOURS PRN
Status: DISCONTINUED | OUTPATIENT
Start: 2023-06-19 | End: 2023-06-21 | Stop reason: HOSPADM

## 2023-06-19 RX ORDER — SODIUM CHLORIDE 9 MG/ML
INJECTION, SOLUTION INTRAVENOUS PRN
Status: DISCONTINUED | OUTPATIENT
Start: 2023-06-19 | End: 2023-06-21 | Stop reason: HOSPADM

## 2023-06-19 RX ORDER — ONDANSETRON 2 MG/ML
4 INJECTION INTRAMUSCULAR; INTRAVENOUS EVERY 6 HOURS PRN
Status: DISCONTINUED | OUTPATIENT
Start: 2023-06-19 | End: 2023-06-21 | Stop reason: HOSPADM

## 2023-06-19 RX ORDER — SODIUM CHLORIDE 0.9 % (FLUSH) 0.9 %
5-40 SYRINGE (ML) INJECTION EVERY 12 HOURS SCHEDULED
Status: DISCONTINUED | OUTPATIENT
Start: 2023-06-19 | End: 2023-06-21 | Stop reason: HOSPADM

## 2023-06-19 RX ORDER — SODIUM CHLORIDE 0.9 % (FLUSH) 0.9 %
5-40 SYRINGE (ML) INJECTION PRN
Status: DISCONTINUED | OUTPATIENT
Start: 2023-06-19 | End: 2023-06-21 | Stop reason: HOSPADM

## 2023-06-19 RX ORDER — METOPROLOL TARTRATE 5 MG/5ML
5 INJECTION INTRAVENOUS EVERY 6 HOURS PRN
Status: DISCONTINUED | OUTPATIENT
Start: 2023-06-19 | End: 2023-06-21 | Stop reason: HOSPADM

## 2023-06-19 RX ORDER — DILTIAZEM HYDROCHLORIDE 5 MG/ML
10 INJECTION INTRAVENOUS ONCE
Status: COMPLETED | OUTPATIENT
Start: 2023-06-19 | End: 2023-06-19

## 2023-06-19 RX ORDER — MAGNESIUM SULFATE IN WATER 40 MG/ML
2000 INJECTION, SOLUTION INTRAVENOUS ONCE
Status: COMPLETED | OUTPATIENT
Start: 2023-06-19 | End: 2023-06-19

## 2023-06-19 RX ORDER — POLYETHYLENE GLYCOL 3350 17 G/17G
17 POWDER, FOR SOLUTION ORAL DAILY PRN
Status: DISCONTINUED | OUTPATIENT
Start: 2023-06-19 | End: 2023-06-21 | Stop reason: HOSPADM

## 2023-06-19 RX ORDER — ONDANSETRON 4 MG/1
4 TABLET, ORALLY DISINTEGRATING ORAL EVERY 8 HOURS PRN
Status: DISCONTINUED | OUTPATIENT
Start: 2023-06-19 | End: 2023-06-21 | Stop reason: HOSPADM

## 2023-06-19 RX ORDER — ACETAMINOPHEN 325 MG/1
650 TABLET ORAL EVERY 6 HOURS PRN
Status: DISCONTINUED | OUTPATIENT
Start: 2023-06-19 | End: 2023-06-21 | Stop reason: HOSPADM

## 2023-06-19 RX ORDER — ENOXAPARIN SODIUM 100 MG/ML
1 INJECTION SUBCUTANEOUS 2 TIMES DAILY
Status: DISCONTINUED | OUTPATIENT
Start: 2023-06-19 | End: 2023-06-21

## 2023-06-19 RX ADMIN — MAGNESIUM SULFATE HEPTAHYDRATE 2000 MG: 40 INJECTION, SOLUTION INTRAVENOUS at 20:53

## 2023-06-19 RX ADMIN — DILTIAZEM HYDROCHLORIDE 10 MG: 5 INJECTION INTRAVENOUS at 19:55

## 2023-06-19 ASSESSMENT — ENCOUNTER SYMPTOMS
EYE DISCHARGE: 0
PHOTOPHOBIA: 0
COUGH: 0
SHORTNESS OF BREATH: 0
WHEEZING: 0
ABDOMINAL PAIN: 0
SORE THROAT: 0
CHEST TIGHTNESS: 0
ABDOMINAL DISTENTION: 0
VOMITING: 0

## 2023-06-19 ASSESSMENT — PAIN DESCRIPTION - ORIENTATION: ORIENTATION: MID;LEFT

## 2023-06-19 ASSESSMENT — PAIN DESCRIPTION - PAIN TYPE: TYPE: ACUTE PAIN

## 2023-06-19 ASSESSMENT — PAIN DESCRIPTION - FREQUENCY: FREQUENCY: CONTINUOUS

## 2023-06-19 ASSESSMENT — PAIN SCALES - GENERAL: PAINLEVEL_OUTOF10: 7

## 2023-06-19 ASSESSMENT — PAIN DESCRIPTION - LOCATION: LOCATION: CHEST

## 2023-06-19 ASSESSMENT — PAIN - FUNCTIONAL ASSESSMENT: PAIN_FUNCTIONAL_ASSESSMENT: 0-10

## 2023-06-19 NOTE — ED NOTES
Lab called for lab draw.  Electronically signed by Bridget Miller RN on 6/19/2023 at [address] Gilson Cruz, RN  06/19/23 2000

## 2023-06-19 NOTE — ED PROVIDER NOTES
[address] [facility] ED  eMERGENCY dEPARTMENT eNCOUnter      Pt Name: Leonard Rolon  MRN: [id number]  [place] 1945  Date of evaluation: 6/19/2023  Provider: Liam Yo MD    CHIEF COMPLAINT       Chief Complaint   Patient presents with    Chest Pain         HISTORY OF PRESENT ILLNESS   (Location/Symptom, Timing/Onset,Context/Setting, Quality, Duration, Modifying Factors, Severity)  Note limiting factors. Leonard Rolon is a 78 y.o. female who presents to the emergency department with history of multiple medical problems but no cardiac history. Comes in with complaints of chest pain that began 30 minutes prior to arrival.  She describes a burning sensation in the chest with no other associated symptoms. Presents here with new onset A-fib and RVR. Overall rate approximately 130. Daughter is in the room and helps with translation in [place]. No shortness of breath, abdominal pain, nausea, or radiation of the pain. 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 REVIEW OF SYSTEMS    (2-9 systems for level 4, 10 or more for level 5)     Review of Systems   Constitutional:  Negative for chills and diaphoresis. HENT:  Negative for congestion, ear pain, mouth sores and sore throat. Eyes:  Negative for photophobia and discharge. Respiratory:  Negative for cough, chest tightness, shortness of breath and wheezing. Cardiovascular:  Positive for chest pain. Negative for palpitations. Gastrointestinal:  Negative for abdominal distention, abdominal pain and vomiting. Endocrine: Negative for cold intolerance. Genitourinary:  Negative for difficulty urinating. Musculoskeletal:  Negative for arthralgias. Skin:  Negative for pallor and rash. Allergic/Immunologic: Negative for immunocompromised state. Neurological:  Negative for dizziness and syncope. Hematological:  Negative for adenopathy.

## 2023-06-19 NOTE — ED NOTES
Pt here via EMS for mid-sternal chest pain that began 30 minutes prior. Patient states pain is burning sensation; rating pain 7/10. EN route, patient afib RVR on EKG (-140s). Patient given one nitro and an aspirin. Pt denies SOB/dizziness. Patient denies cardiac hx. Pt is A&Ox4.  Electronically signed by Irene Hay RN on 6/19/2023 at 7:28 PM         Irene Hay RN  06/19/23 [address]  06/19/23 7153

## 2023-06-20 LAB
ANION GAP SERPL CALCULATED.3IONS-SCNC: 12 MEQ/L (ref 9–15)
BASOPHILS # BLD: 0 K/UL (ref 0–0.2)
BASOPHILS NFR BLD: 0.7 %
BUN SERPL-MCNC: 12 MG/DL (ref 8–23)
CALCIUM SERPL-MCNC: 9.8 MG/DL (ref 8.5–9.9)
CHLORIDE SERPL-SCNC: 109 MEQ/L (ref 95–107)
CO2 SERPL-SCNC: 23 MEQ/L (ref 20–31)
CREAT SERPL-MCNC: 0.8 MG/DL (ref 0.5–0.9)
EKG ATRIAL RATE: 119 BPM
EKG Q-T INTERVAL: 326 MS
EKG QRS DURATION: 90 MS
EKG QTC CALCULATION (BAZETT): 485 MS
EKG R AXIS: -45 DEGREES
EKG T AXIS: 110 DEGREES
EKG VENTRICULAR RATE: 133 BPM
EOSINOPHIL # BLD: 0.1 K/UL (ref 0–0.7)
EOSINOPHIL NFR BLD: 2.5 %
ERYTHROCYTE [DISTWIDTH] IN BLOOD BY AUTOMATED COUNT: 13 % (ref 11.5–14.5)
GLUCOSE SERPL-MCNC: 84 MG/DL (ref 70–99)
HCT VFR BLD AUTO: 40.9 % (ref 37–47)
HGB BLD-MCNC: 13.4 G/DL (ref 12–16)
INR PPP: 1.1
LV EF: 60 %
LVEF MODALITY: NORMAL
LYMPHOCYTES # BLD: 1.1 K/UL (ref 1–4.8)
LYMPHOCYTES NFR BLD: 22.1 %
MCH RBC QN AUTO: 30.9 PG (ref 27–31.3)
MCHC RBC AUTO-ENTMCNC: 32.8 % (ref 33–37)
MCV RBC AUTO: 94.1 FL (ref 79.4–94.8)
MONOCYTES # BLD: 0.5 K/UL (ref 0.2–0.8)
MONOCYTES NFR BLD: 10.4 %
NEUTROPHILS # BLD: 3.2 K/UL (ref 1.4–6.5)
NEUTS SEG NFR BLD: 64.3 %
PLATELET # BLD AUTO: 135 K/UL (ref 130–400)
POTASSIUM SERPL-SCNC: 4.2 MEQ/L (ref 3.4–4.9)
PROTHROMBIN TIME: 13.9 SEC (ref 12.3–14.9)
RBC # BLD AUTO: 4.35 M/UL (ref 4.2–5.4)
SODIUM SERPL-SCNC: 144 MEQ/L (ref 135–144)
TROPONIN T SERPL-MCNC: <0.01 NG/ML (ref 0–0.01)
WBC # BLD AUTO: 4.9 K/UL (ref 4.8–10.8)

## 2023-06-20 PROCEDURE — 6370000000 HC RX 637 (ALT 250 FOR IP): Performed by: INTERNAL MEDICINE

## 2023-06-20 PROCEDURE — 99223 1ST HOSP IP/OBS HIGH 75: CPT | Performed by: INTERNAL MEDICINE

## 2023-06-20 PROCEDURE — 80048 BASIC METABOLIC PNL TOTAL CA: CPT

## 2023-06-20 PROCEDURE — 6360000002 HC RX W HCPCS: Performed by: INTERNAL MEDICINE

## 2023-06-20 PROCEDURE — 36415 COLL VENOUS BLD VENIPUNCTURE: CPT

## 2023-06-20 PROCEDURE — 1210000000 HC MED SURG R&B

## 2023-06-20 PROCEDURE — 85610 PROTHROMBIN TIME: CPT

## 2023-06-20 PROCEDURE — 93306 TTE W/DOPPLER COMPLETE: CPT

## 2023-06-20 PROCEDURE — 84484 ASSAY OF TROPONIN QUANT: CPT

## 2023-06-20 PROCEDURE — 85025 COMPLETE CBC W/AUTO DIFF WBC: CPT

## 2023-06-20 PROCEDURE — 2580000003 HC RX 258: Performed by: INTERNAL MEDICINE

## 2023-06-20 RX ORDER — AMLODIPINE BESYLATE 5 MG/1
5 TABLET ORAL DAILY
Status: DISCONTINUED | OUTPATIENT
Start: 2023-06-20 | End: 2023-06-21 | Stop reason: HOSPADM

## 2023-06-20 RX ORDER — MYCOPHENOLATE MOFETIL 250 MG/1
250 CAPSULE ORAL 2 TIMES DAILY
Status: DISCONTINUED | OUTPATIENT
Start: 2023-06-20 | End: 2023-06-21 | Stop reason: HOSPADM

## 2023-06-20 RX ORDER — ESCITALOPRAM OXALATE 10 MG/1
10 TABLET ORAL DAILY
Status: DISCONTINUED | OUTPATIENT
Start: 2023-06-20 | End: 2023-06-21 | Stop reason: HOSPADM

## 2023-06-20 RX ORDER — LANOLIN ALCOHOL/MO/W.PET/CERES
400 CREAM (GRAM) TOPICAL DAILY
Status: DISCONTINUED | OUTPATIENT
Start: 2023-06-20 | End: 2023-06-21 | Stop reason: HOSPADM

## 2023-06-20 RX ORDER — PREDNISONE 5 MG/1
5 TABLET ORAL DAILY
Status: DISCONTINUED | OUTPATIENT
Start: 2023-06-20 | End: 2023-06-21 | Stop reason: HOSPADM

## 2023-06-20 RX ORDER — ATORVASTATIN CALCIUM 20 MG/1
20 TABLET, FILM COATED ORAL
Status: DISCONTINUED | OUTPATIENT
Start: 2023-06-20 | End: 2023-06-21 | Stop reason: HOSPADM

## 2023-06-20 RX ORDER — TACROLIMUS 1 MG/1
1 CAPSULE ORAL 2 TIMES DAILY
Status: DISCONTINUED | OUTPATIENT
Start: 2023-06-20 | End: 2023-06-21 | Stop reason: HOSPADM

## 2023-06-20 RX ORDER — ASPIRIN 81 MG/1
81 TABLET ORAL DAILY
Status: DISCONTINUED | OUTPATIENT
Start: 2023-06-20 | End: 2023-06-21 | Stop reason: HOSPADM

## 2023-06-20 RX ADMIN — AMLODIPINE BESYLATE 5 MG: 5 TABLET ORAL at 09:32

## 2023-06-20 RX ADMIN — ENOXAPARIN SODIUM 70 MG: 100 INJECTION SUBCUTANEOUS at 01:44

## 2023-06-20 RX ADMIN — TACROLIMUS 1 MG: 1 CAPSULE ORAL at 09:31

## 2023-06-20 RX ADMIN — ENOXAPARIN SODIUM 70 MG: 100 INJECTION SUBCUTANEOUS at 09:33

## 2023-06-20 RX ADMIN — ASPIRIN 81 MG: 81 TABLET, COATED ORAL at 10:47

## 2023-06-20 RX ADMIN — MYCOPHENOLATE MOFETIL 250 MG: 250 CAPSULE ORAL at 21:38

## 2023-06-20 RX ADMIN — ESCITALOPRAM OXALATE 10 MG: 10 TABLET ORAL at 09:32

## 2023-06-20 RX ADMIN — PREDNISONE 5 MG: 5 TABLET ORAL at 09:31

## 2023-06-20 RX ADMIN — TACROLIMUS 1 MG: 1 CAPSULE ORAL at 21:39

## 2023-06-20 RX ADMIN — METOPROLOL TARTRATE 25 MG: 25 TABLET, FILM COATED ORAL at 09:32

## 2023-06-20 RX ADMIN — Medication 400 MG: at 09:31

## 2023-06-20 RX ADMIN — SODIUM CHLORIDE, PRESERVATIVE FREE 10 ML: 5 INJECTION INTRAVENOUS at 09:37

## 2023-06-20 RX ADMIN — MYCOPHENOLATE MOFETIL 250 MG: 250 CAPSULE ORAL at 09:31

## 2023-06-20 RX ADMIN — METOPROLOL TARTRATE 25 MG: 25 TABLET, FILM COATED ORAL at 21:38

## 2023-06-20 RX ADMIN — ATORVASTATIN CALCIUM 20 MG: 20 TABLET, FILM COATED ORAL at 21:39

## 2023-06-20 RX ADMIN — SODIUM CHLORIDE, PRESERVATIVE FREE 10 ML: 5 INJECTION INTRAVENOUS at 21:40

## 2023-06-20 ASSESSMENT — ENCOUNTER SYMPTOMS
WHEEZING: 0
COUGH: 0
CHEST TIGHTNESS: 1
EYES NEGATIVE: 1
NAUSEA: 0
SHORTNESS OF BREATH: 1
STRIDOR: 0
GASTROINTESTINAL NEGATIVE: 1
BLOOD IN STOOL: 0

## 2023-06-20 ASSESSMENT — PAIN SCALES - GENERAL: PAINLEVEL_OUTOF10: 0

## 2023-06-20 NOTE — ED NOTES
Pt reports that chest pain feels better since receiving diltiazem. Rates pain 2/10.  Electronically signed by Dane Masterson RN on 6/19/2023 at 8:33 PM       Dane Masterson RN  06/19/23 2033

## 2023-06-20 NOTE — H&P
[place] [address] MEDICINE    HISTORY AND PHYSICAL EXAM    PATIENT NAME:  Davie Molina    MRN:  [id number]  SERVICE DATE:  6/19/2023   SERVICE TIME:  11:30 PM    Primary Care Physician: Carmelo Dancer, MD     SUBJECTIVE  CHIEF COMPLAINT: Chest pain    HPI:  Davie Molina is a 78 y.o., , female who presents with complaint of chest pain. PMH significant for  has a past medical history of Anxiety, Cardiomegaly, Chronic kidney disease, Colon polyps, Depression, Gallbladder polyp, GERD (gastroesophageal reflux disease), Gout, Gout, Hypertension, Hyperuricemia, Hypothyroid, and Vitamin D deficiency. .      Patient is a 81-year-old [facility] female presenting to the ED with her daughter with complaint of chest pain approximate 2030 minutes prior to arrival.  EMS found her to be in atrial fibrillation with rapid ventricular response in the 120s to 140s, for which she received 1 nitroglycerin and 1 aspirin. She denies any previous cardiac history aside from some blood pressure issues. She additionally reported had a kidney transplant 5 years ago after having been on hemodialysis for 7 years before that. She also reportedly has history of a brain tumor that was \"fixed\" years ago, with no subsequent chemotherapy, radiation, or cycling. She complains that she has significantly less energy and exercise tolerance over the past months to years, stating that she used to walk 2-300 today without difficulty but now going about 10 steps before she short of breath. Initial troponin in the ED was negative. Laboratory analysis was grossly unremarkable. Initial chest pain was reported ~7/10. She received 10 mg IV diltiazem and 2 g of IV magnesium in the ED, after which her heart rates normalized to the 80s-100s, her chest pain decreased to 2/10 and then 0/10. Hospital service was consulted for admission. Patient is full code.     On examination, via  service on electronic tablet, patient confirms

## 2023-06-20 NOTE — CONSULTS
Inpatient consult to Cardiology  Consult performed by: Logan Cross MD  Consult ordered by: Davonte Le DO        Patient Name: Savanna Martinez Date: 2023  7:07 PM  MR #: [id number]  : 1945    Attending Physician: De Causey MD  Reason for consult: AF     History of Presenting Illness:      Mandi Mccoy is a 78 y.o. female on hospital day 1 with a history of . History Obtained From:  patient, electronic medical record    Pt is a [place] native but speaks [place]. Translation service used. Pt had had ESRD w HD 7 years but subsequently in 2018 had Renal Transplant. Her GFR is normal. She presents with long h/o dizziness SOB and chest pressure with minimal activity. Now she can barely walk in the home setting without being SOB and having Chest pressure. At rest she feels comfortable. She has never had syncope    She presented with Rapid AF rat 120-140s in ER. ECG . She has subsequently converted to SR. Rate is currently 61. In reviewing her records she has had at least Moderate AS dating back to . SHe also has had Left Temporal Brain mass resected remotely    She is a nonsmoker and does not take ETOH  History:      EKG:as above  Past Medical History:   Diagnosis Date    Anxiety     Cardiomegaly     Chronic kidney disease     Colon polyps     Depression     Gallbladder polyp     GERD (gastroesophageal reflux disease)     Gout     Gout     Hypertension     Hyperuricemia     Hypothyroid     Vitamin D deficiency      Past Surgical History:   Procedure Laterality Date    CRANIOTOMY N/A 1/3/2020    CRANIOTOMY FOR EXCISION OF BRAIN TUMOR performed by Pedro Tanner MD at [place] [address]. Left     KIDNEY TRANSPLANT  2018    UPPER GASTROINTESTINAL ENDOSCOPY N/A 2020    EGD performed by Juliet Kan MD at [address] [place] [address] History  History reviewed. No pertinent family history.   [] Unable to obtain due to ventilated and/ or neurologic

## 2023-06-20 NOTE — PROGRESS NOTES
Hospitalist Progress Note      PCP: Sarahi Rodriguez MD    Date of Admission: 6/19/2023    Chief Complaint:    Chief Complaint   Patient presents with    Chest Pain       Subjective:  Patient denies fevers, chills, sweats, CP, SOB, diarrhea or burning micturition. 12 point ROS negative other than mentioned above     Medications:  Reviewed    Infusion Medications    sodium chloride       Scheduled Medications    tacrolimus  1 mg Oral BID    escitalopram  10 mg Oral Daily    mycophenolate  250 mg Oral BID    predniSONE  5 mg Oral Daily    metoprolol tartrate  25 mg Oral BID    magnesium oxide  400 mg Oral Daily    atorvastatin  20 mg Oral QHS    amLODIPine  5 mg Oral Daily    aspirin  81 mg Oral Daily    sodium chloride flush  5-40 mL IntraVENous 2 times per day    enoxaparin  1 mg/kg SubCUTAneous BID     PRN Meds: sodium chloride flush, sodium chloride, ondansetron **OR** ondansetron, polyethylene glycol, acetaminophen **OR** acetaminophen, metoprolol      Intake/Output Summary (Last 24 hours) at 6/20/2023 1247  Last data filed at 6/20/2023 0932  Gross per 24 hour   Intake 525 ml   Output --   Net 525 ml     Exam:    BP (!) 130/59   Pulse 74   Temp 98.2 °F (36.8 °C)   Resp 18   Ht 5' (1.524 m)   Wt 170 lb (77.1 kg)   LMP  (LMP Unknown)   SpO2 96%   BMI 33.20 kg/m²     General appearance: No apparent distress, appears stated age and cooperative. HEENT:  Conjunctivae/corneas clear. Neck: Trachea midline. Respiratory:  Normal respiratory effort. Clear to auscultation  Cardiovascular: Regular rate and rhythm  Abdomen: Soft, non-tender, non-distended with normal bowel sounds.   Musculoskeletal: No clubbing, cyanosis or edema bilaterally  Neuro: Non Focal.   Capillary Refill: Brisk,< 3 seconds   Peripheral Pulses: +2 palpable, equal bilaterally     Labs:   Recent Labs     06/19/23 2004 06/20/23 0316   WBC 5.6 4.9   HGB 13.3 13.4   HCT 40.1 40.9    135     Recent Labs     06/19/23 2004 06/20/23 0316

## 2023-06-20 NOTE — ACP (ADVANCE CARE PLANNING)
Advance Care Planning   Healthcare Decision Maker:    Primary Decision Maker: Kaur Mcintosh Child - [phone]    Secondary Decision Maker: Clemente Kenyon  Child - [phone]    Click here to complete Healthcare Decision Makers including selection of the Healthcare Decision Maker Relationship (ie \"Primary\").

## 2023-06-20 NOTE — CARE COORDINATION
Case Management Assessment  Initial Evaluation    Date/Time of Evaluation: 6/20/2023 11:45 AM  Assessment Completed by: Devonte Mcdonald    If patient is discharged prior to next notation, then this note serves as note for discharge by case management. Patient Name: Neo Jama                   YOB: 1945  Diagnosis: New onset a-fib [facility]) [I48.91]  Atrial fibrillation with rapid ventricular response ([address] [I48.91]  Chest pain, unspecified type [R07.9]                   Date / Time: 6/19/2023  7:07 PM    Patient Admission Status: Inpatient   Readmission Risk (Low < 19, Mod (19-27), High > 27): Readmission Risk Score: 10    Current PCP: Josselyn Trujillo MD  PCP verified by CM? Yes    Chart Reviewed: Yes      History Provided by: Child/Family  Patient Orientation: Alert and Oriented, Person, Place, Situation, Self    Patient Cognition: Other (see comment) (Pt speaks little English and information from dtr)    Hospitalization in the last 30 days (Readmission):  No    If yes, Readmission Assessment in CM Navigator will be completed. Advance Directives:      Code Status: Full Code   Patient's Primary Decision Maker is: Legal Next of Kin    Primary Decision MakerMbisi Cabrera Laura Child - [phone]    Secondary Decision Maker: Jose Luevano  Child - [phone]    Discharge Planning:    Patient lives with:   Type of Home:    Primary Care Giver: Self  Patient Support Systems include: Children   Current Financial resources:    Current community resources:    Current services prior to admission:              Current DME:              Type of Home Care services:       ADLS  Prior functional level: Independent in ADLs/IADLs  Current functional level: Other (see comment) (We will need to evaluate how pt does once she is up.)    PT AM-PAC:   /24  OT AM-PAC:   /24    Family can provide assistance at DC:  Yes  Would you like Case Management to discuss the discharge plan with any other family

## 2023-06-20 NOTE — PROGRESS NOTES
Patient complaining of being constipated, last bm 6-18, prune juice given and message sent to Wilian Spencer NP for something else if that does not work

## 2023-06-21 ENCOUNTER — APPOINTMENT (OUTPATIENT)
Dept: CARDIAC CATH/INVASIVE PROCEDURES | Age: 78
DRG: 287 | End: 2023-06-21
Payer: COMMERCIAL

## 2023-06-21 VITALS
OXYGEN SATURATION: 94 % | SYSTOLIC BLOOD PRESSURE: 123 MMHG | BODY MASS INDEX: 33.38 KG/M2 | WEIGHT: 170 LBS | HEIGHT: 60 IN | TEMPERATURE: 97.8 F | HEART RATE: 64 BPM | RESPIRATION RATE: 18 BRPM | DIASTOLIC BLOOD PRESSURE: 64 MMHG

## 2023-06-21 LAB
ANION GAP SERPL CALCULATED.3IONS-SCNC: 10 MEQ/L (ref 9–15)
BASOPHILS # BLD: 0.1 K/UL (ref 0–0.2)
BASOPHILS NFR BLD: 1.2 %
BUN SERPL-MCNC: 11 MG/DL (ref 8–23)
CALCIUM SERPL-MCNC: 9.8 MG/DL (ref 8.5–9.9)
CHLORIDE SERPL-SCNC: 107 MEQ/L (ref 95–107)
CO2 SERPL-SCNC: 25 MEQ/L (ref 20–31)
CREAT SERPL-MCNC: 0.74 MG/DL (ref 0.5–0.9)
EOSINOPHIL # BLD: 0.1 K/UL (ref 0–0.7)
EOSINOPHIL NFR BLD: 2.7 %
ERYTHROCYTE [DISTWIDTH] IN BLOOD BY AUTOMATED COUNT: 13.4 % (ref 11.5–14.5)
GLUCOSE SERPL-MCNC: 81 MG/DL (ref 70–99)
HCT VFR BLD AUTO: 41.2 % (ref 37–47)
HGB BLD-MCNC: 13.7 G/DL (ref 12–16)
LYMPHOCYTES # BLD: 1.1 K/UL (ref 1–4.8)
LYMPHOCYTES NFR BLD: 22.8 %
MCH RBC QN AUTO: 31.1 PG (ref 27–31.3)
MCHC RBC AUTO-ENTMCNC: 33.2 % (ref 33–37)
MCV RBC AUTO: 93.7 FL (ref 79.4–94.8)
MONOCYTES # BLD: 0.5 K/UL (ref 0.2–0.8)
MONOCYTES NFR BLD: 11.2 %
NEUTROPHILS # BLD: 3 K/UL (ref 1.4–6.5)
NEUTS SEG NFR BLD: 62.1 %
PLATELET # BLD AUTO: 125 K/UL (ref 130–400)
POTASSIUM SERPL-SCNC: 4.2 MEQ/L (ref 3.4–4.9)
RBC # BLD AUTO: 4.4 M/UL (ref 4.2–5.4)
SODIUM SERPL-SCNC: 142 MEQ/L (ref 135–144)
WBC # BLD AUTO: 4.8 K/UL (ref 4.8–10.8)

## 2023-06-21 PROCEDURE — 6370000000 HC RX 637 (ALT 250 FOR IP): Performed by: INTERNAL MEDICINE

## 2023-06-21 PROCEDURE — C1894 INTRO/SHEATH, NON-LASER: HCPCS

## 2023-06-21 PROCEDURE — 2709999900 HC NON-CHARGEABLE SUPPLY

## 2023-06-21 PROCEDURE — 36415 COLL VENOUS BLD VENIPUNCTURE: CPT

## 2023-06-21 PROCEDURE — 2580000003 HC RX 258: Performed by: INTERNAL MEDICINE

## 2023-06-21 PROCEDURE — 6360000002 HC RX W HCPCS

## 2023-06-21 PROCEDURE — 6360000002 HC RX W HCPCS: Performed by: INTERNAL MEDICINE

## 2023-06-21 PROCEDURE — 6360000004 HC RX CONTRAST MEDICATION: Performed by: INTERNAL MEDICINE

## 2023-06-21 PROCEDURE — 93458 L HRT ARTERY/VENTRICLE ANGIO: CPT | Performed by: INTERNAL MEDICINE

## 2023-06-21 PROCEDURE — 80048 BASIC METABOLIC PNL TOTAL CA: CPT

## 2023-06-21 PROCEDURE — 2500000003 HC RX 250 WO HCPCS

## 2023-06-21 PROCEDURE — C1769 GUIDE WIRE: HCPCS

## 2023-06-21 PROCEDURE — 4A023N7 MEASUREMENT OF CARDIAC SAMPLING AND PRESSURE, LEFT HEART, PERCUTANEOUS APPROACH: ICD-10-PCS | Performed by: INTERNAL MEDICINE

## 2023-06-21 PROCEDURE — B2111ZZ FLUOROSCOPY OF MULTIPLE CORONARY ARTERIES USING LOW OSMOLAR CONTRAST: ICD-10-PCS | Performed by: INTERNAL MEDICINE

## 2023-06-21 PROCEDURE — 93458 L HRT ARTERY/VENTRICLE ANGIO: CPT

## 2023-06-21 PROCEDURE — 99152 MOD SED SAME PHYS/QHP 5/>YRS: CPT | Performed by: INTERNAL MEDICINE

## 2023-06-21 PROCEDURE — 99233 SBSQ HOSP IP/OBS HIGH 50: CPT | Performed by: INTERNAL MEDICINE

## 2023-06-21 PROCEDURE — 85025 COMPLETE CBC W/AUTO DIFF WBC: CPT

## 2023-06-21 RX ORDER — ASPIRIN 81 MG/1
81 TABLET ORAL DAILY
Qty: 30 TABLET | Refills: 3 | Status: SHIPPED | OUTPATIENT
Start: 2023-06-21

## 2023-06-21 RX ORDER — ONDANSETRON 2 MG/ML
4 INJECTION INTRAMUSCULAR; INTRAVENOUS EVERY 6 HOURS PRN
Status: DISCONTINUED | OUTPATIENT
Start: 2023-06-21 | End: 2023-06-21 | Stop reason: SDUPTHER

## 2023-06-21 RX ORDER — DIPHENHYDRAMINE HYDROCHLORIDE 50 MG/ML
50 INJECTION INTRAMUSCULAR; INTRAVENOUS ONCE
Status: DISCONTINUED | OUTPATIENT
Start: 2023-06-21 | End: 2023-06-21

## 2023-06-21 RX ORDER — ONDANSETRON 2 MG/ML
4 INJECTION INTRAMUSCULAR; INTRAVENOUS EVERY 6 HOURS PRN
Status: DISCONTINUED | OUTPATIENT
Start: 2023-06-21 | End: 2023-06-21 | Stop reason: HOSPADM

## 2023-06-21 RX ORDER — AMLODIPINE BESYLATE 10 MG/1
10 TABLET ORAL ONCE
Status: COMPLETED | OUTPATIENT
Start: 2023-06-21 | End: 2023-06-21

## 2023-06-21 RX ORDER — METOPROLOL SUCCINATE 50 MG/1
50 TABLET, EXTENDED RELEASE ORAL DAILY
Status: DISCONTINUED | OUTPATIENT
Start: 2023-06-21 | End: 2023-06-21 | Stop reason: HOSPADM

## 2023-06-21 RX ORDER — SODIUM CHLORIDE 0.9 % (FLUSH) 0.9 %
5-40 SYRINGE (ML) INJECTION PRN
Status: DISCONTINUED | OUTPATIENT
Start: 2023-06-21 | End: 2023-06-21 | Stop reason: HOSPADM

## 2023-06-21 RX ORDER — NITROGLYCERIN 0.4 MG/1
0.4 TABLET SUBLINGUAL EVERY 5 MIN PRN
Status: DISCONTINUED | OUTPATIENT
Start: 2023-06-21 | End: 2023-06-21 | Stop reason: HOSPADM

## 2023-06-21 RX ORDER — SODIUM CHLORIDE 0.9 % (FLUSH) 0.9 %
5-40 SYRINGE (ML) INJECTION EVERY 12 HOURS SCHEDULED
Status: DISCONTINUED | OUTPATIENT
Start: 2023-06-21 | End: 2023-06-21 | Stop reason: HOSPADM

## 2023-06-21 RX ORDER — SODIUM CHLORIDE 9 MG/ML
INJECTION, SOLUTION INTRAVENOUS PRN
Status: DISCONTINUED | OUTPATIENT
Start: 2023-06-21 | End: 2023-06-21 | Stop reason: HOSPADM

## 2023-06-21 RX ORDER — ACETAMINOPHEN 325 MG/1
650 TABLET ORAL EVERY 4 HOURS PRN
Status: DISCONTINUED | OUTPATIENT
Start: 2023-06-21 | End: 2023-06-21 | Stop reason: SDUPTHER

## 2023-06-21 RX ORDER — SODIUM CHLORIDE 450 MG/100ML
INJECTION, SOLUTION INTRAVENOUS CONTINUOUS
Status: DISCONTINUED | OUTPATIENT
Start: 2023-06-21 | End: 2023-06-21 | Stop reason: HOSPADM

## 2023-06-21 RX ORDER — NITROGLYCERIN 0.4 MG/1
0.4 TABLET SUBLINGUAL EVERY 5 MIN PRN
Qty: 25 TABLET | Refills: 3 | Status: SHIPPED | OUTPATIENT
Start: 2023-06-21

## 2023-06-21 RX ORDER — METOPROLOL SUCCINATE 50 MG/1
50 TABLET, EXTENDED RELEASE ORAL DAILY
Qty: 30 TABLET | Refills: 3 | Status: SHIPPED | OUTPATIENT
Start: 2023-06-21

## 2023-06-21 RX ADMIN — METOPROLOL SUCCINATE 50 MG: 50 TABLET, EXTENDED RELEASE ORAL at 11:39

## 2023-06-21 RX ADMIN — AMLODIPINE BESYLATE 10 MG: 10 TABLET ORAL at 12:22

## 2023-06-21 RX ADMIN — TACROLIMUS 1 MG: 1 CAPSULE ORAL at 10:50

## 2023-06-21 RX ADMIN — ASPIRIN 81 MG: 81 TABLET, COATED ORAL at 10:49

## 2023-06-21 RX ADMIN — APIXABAN 5 MG: 5 TABLET, FILM COATED ORAL at 11:38

## 2023-06-21 RX ADMIN — SODIUM CHLORIDE: 4.5 INJECTION, SOLUTION INTRAVENOUS at 14:50

## 2023-06-21 RX ADMIN — ESCITALOPRAM OXALATE 10 MG: 10 TABLET ORAL at 10:49

## 2023-06-21 RX ADMIN — ACETAMINOPHEN 650 MG: 325 TABLET ORAL at 14:46

## 2023-06-21 RX ADMIN — Medication 400 MG: at 10:49

## 2023-06-21 RX ADMIN — PREDNISONE 5 MG: 5 TABLET ORAL at 10:50

## 2023-06-21 RX ADMIN — IOPAMIDOL 61 ML: 612 INJECTION, SOLUTION INTRAVENOUS at 09:50

## 2023-06-21 RX ADMIN — AMLODIPINE BESYLATE 5 MG: 5 TABLET ORAL at 10:49

## 2023-06-21 RX ADMIN — MYCOPHENOLATE MOFETIL 250 MG: 250 CAPSULE ORAL at 10:49

## 2023-06-21 ASSESSMENT — PAIN SCALES - GENERAL
PAINLEVEL_OUTOF10: 0
PAINLEVEL_OUTOF10: 2
PAINLEVEL_OUTOF10: 2
PAINLEVEL_OUTOF10: 0

## 2023-06-21 ASSESSMENT — PAIN DESCRIPTION - LOCATION
LOCATION: HEAD

## 2023-06-21 ASSESSMENT — ENCOUNTER SYMPTOMS
BLOOD IN STOOL: 0
NAUSEA: 0
COUGH: 0
CHEST TIGHTNESS: 1
EYES NEGATIVE: 1
WHEEZING: 0
SHORTNESS OF BREATH: 1
GASTROINTESTINAL NEGATIVE: 1
STRIDOR: 0

## 2023-06-21 NOTE — PROGRESS NOTES
Hospitalist Progress Note      PCP: Lon Lucero MD    Date of Admission: 6/19/2023    Chief Complaint:    Chief Complaint   Patient presents with    Chest Pain       Subjective:  Patient denies fevers, chills, sweats, CP, SOB, diarrhea or burning micturition.  used. 12 point ROS negative other than mentioned above     Medications:  Reviewed    Infusion Medications    sodium chloride       Scheduled Medications    tacrolimus  1 mg Oral BID    escitalopram  10 mg Oral Daily    mycophenolate  250 mg Oral BID    predniSONE  5 mg Oral Daily    metoprolol tartrate  25 mg Oral BID    magnesium oxide  400 mg Oral Daily    atorvastatin  20 mg Oral QHS    amLODIPine  5 mg Oral Daily    aspirin  81 mg Oral Daily    sodium chloride flush  5-40 mL IntraVENous 2 times per day    enoxaparin  1 mg/kg SubCUTAneous BID     PRN Meds: ondansetron, nitroGLYCERIN, magnesium hydroxide, sodium chloride flush, sodium chloride, ondansetron **OR** ondansetron, polyethylene glycol, acetaminophen **OR** acetaminophen, metoprolol      Intake/Output Summary (Last 24 hours) at 6/21/2023 0932  Last data filed at 6/20/2023 1351  Gross per 24 hour   Intake 240 ml   Output --   Net 240 ml       Exam:    BP (!) 162/71   Pulse 63   Temp 97.9 °F (36.6 °C) (Oral)   Resp 18   Ht 5' (1.524 m)   Wt 170 lb (77.1 kg)   LMP  (LMP Unknown)   SpO2 96%   BMI 33.20 kg/m²     General appearance: No apparent distress, appears stated age and cooperative. HEENT:  Conjunctivae/corneas clear. Neck: Trachea midline. Respiratory:  Normal respiratory effort. Clear to auscultation  Cardiovascular: Regular rate and rhythm  Abdomen: Soft, non-tender, non-distended with normal bowel sounds.   Musculoskeletal: No clubbing, cyanosis or edema bilaterally  Neuro: Non Focal.   Capillary Refill: Brisk,< 3 seconds   Peripheral Pulses: +2 palpable, equal bilaterally     Labs:   Recent Labs     06/19/23 2004 06/20/23  0316 06/21/23  0456   WBC 5.6 4.9 4.8

## 2023-06-21 NOTE — PROGRESS NOTES
Received report from [facility]. Request made for transport to cath lab by wheelchair.

## 2023-06-21 NOTE — PROGRESS NOTES
Returns from procedure. Drowsy, responds easily to name. Received report from [facility]. Vital signs are stable on room air. R radial vasc band in place no signs of bleeding or hematoma. 82 Loda Kurtis removal initiated from vasc band as ordered. No signs of bleeding. 1135 Eating and drinking without difficulty. 1200 Pt remains hypertensive despite daily medications given. Dr. Dmitriy Vela aware. New orders received. 1230 Vasc band discontinued. Hematoma begins to develop as vasc band is being removed. Manual pressure held X15 minutes. Wrist is then soft without any signs of bleeding or hematoma. Report called to [facility] [place]. Request made for transport back to room. Placed on portable tele monitor.

## 2023-06-21 NOTE — PROGRESS NOTES
PROGRESS NOTE    Patient Name: Brigid Bains  Admit Date: 2023  7:07 PM  MR #: [id number]  : 1945    Attending Physician: Elizabeth Jade MD  Reason for consult: AF     History of Presenting Illness:      Brigid Bains is a 78 y.o. female on hospital day 2 with a history of . History Obtained From:  patient, electronic medical record    Pt is a [place] native but speaks [place]. Translation service used. Pt had had ESRD w HD 7 years but subsequently in 2018 had Renal Transplant. Her GFR is normal. She presents with long h/o dizziness SOB and chest pressure with minimal activity. Now she can barely walk in the home setting without being SOB and having Chest pressure. At rest she feels comfortable. She has never had syncope    She presented with Rapid AF rat 120-140s in ER. ECG . She has subsequently converted to SR. Rate is currently 61. In reviewing her records she has had at least Moderate AS dating back to . SHe also has had Left Temporal Brain mass resected remotely    She is a nonsmoker and does not take ETOH    23 noo events overnight. At rest asymptomatic. Daughter, Harriett Odonnell in room. No fever Telemetry SR 60 on no O2. History:      EKG:as above  Past Medical History:   Diagnosis Date    Anxiety     Cardiomegaly     Chronic kidney disease     Colon polyps     Depression     Gallbladder polyp     GERD (gastroesophageal reflux disease)     Gout     Gout     Hypertension     Hyperuricemia     Hypothyroid     Vitamin D deficiency      Past Surgical History:   Procedure Laterality Date    CRANIOTOMY N/A 1/3/2020    CRANIOTOMY FOR EXCISION OF BRAIN TUMOR performed by Naomi Marquez MD at [place] [address]. Left     KIDNEY TRANSPLANT  2018    UPPER GASTROINTESTINAL ENDOSCOPY N/A 2020    EGD performed by Enio Jasmine MD at [address] [place] [address] History  History reviewed. No pertinent family history.   [] Unable to obtain due to ventilated and/ or

## 2023-06-21 NOTE — BRIEF OP NOTE
Brief Postoperative Note  Section of Cardiology  Adult Brief Cardiac Cath Procedure Note        Procedure(s):  LHC, b/l coronary angio via RRA    Pre-operative Diagnosis:  [place] CP AS    H&P Status: Completed and reviewed. Post-operative Diagnosis:  All Coronaries with minimal plaque. No obstructive lesion. LVEF 70% EDP 16. Moderate AS by TTE. No GUERLINE needed.      Findings:  See full report    Complications:  none    Primary Proceduralist:   Yara Almanza MD

## 2023-06-21 NOTE — FLOWSHEET NOTE
[address]358 used for assessment. Stated She was not sure if she was going to have heart cath today. States her daughter and the doctor need to talk about it in the morning because she is not comfortable making that decision.  Electronically signed by Veronica Zapien RN on 6/21/2023

## 2023-12-26 RX ORDER — ASPIRIN 81 MG/1
81 TABLET, COATED ORAL DAILY
Qty: 30 TABLET | Refills: 3 | Status: SHIPPED | OUTPATIENT
Start: 2023-12-26

## 2023-12-26 RX ORDER — APIXABAN 5 MG/1
5 TABLET, FILM COATED ORAL 2 TIMES DAILY
Qty: 60 TABLET | Refills: 5 | Status: SHIPPED | OUTPATIENT
Start: 2023-12-26

## 2023-12-26 NOTE — TELEPHONE ENCOUNTER
Requesting medication refill. Please approve or deny this request.    Rx requested:  Requested Prescriptions     Pending Prescriptions Disp Refills    ASPIRIN LOW DOSE 81 MG EC tablet [Pharmacy Med Name: ASPIRIN EC 81 MG TABLET] 30 tablet 3     Sig: take 1 tablet by mouth once daily    ELIQUIS 5 MG TABS tablet [Pharmacy Med Name: ELIQUIS 5 MG TABLET] 60 tablet 5     Sig: take 1 tablet by mouth twice a day         Last Office Visit:   Visit date not found      Next Visit Date:  No future appointments. Last refill 06/21/2023. Please approve or deny.

## 2024-04-24 RX ORDER — ASPIRIN 81 MG/1
81 TABLET, COATED ORAL DAILY
Qty: 30 TABLET | Refills: 0 | OUTPATIENT
Start: 2024-04-24

## 2024-04-24 NOTE — TELEPHONE ENCOUNTER
Patient needs OV. No refills given.    Requesting medication refill. Please approve or deny this request.    Rx requested:  Requested Prescriptions     Pending Prescriptions Disp Refills    ASPIRIN LOW DOSE 81 MG EC tablet [Pharmacy Med Name: Aspirin Low Dose Oral Tablet Delayed Release 81 MG] 30 tablet 0     Sig: TAKE 1 TABLET BY MOUTH ONCE DAILY         Last Office Visit:   Visit date not found      Next Visit Date:  No future appointments.            Last refill 12/26/2023. Please approve or deny.

## 2024-05-03 RX ORDER — ASPIRIN 81 MG/1
81 TABLET, COATED ORAL DAILY
Qty: 30 TABLET | Refills: 0 | OUTPATIENT
Start: 2024-05-03

## 2025-08-19 ENCOUNTER — HOSPITAL ENCOUNTER (INPATIENT)
Age: 80
LOS: 3 days | Discharge: ANOTHER HOSPITAL | End: 2025-08-22
Attending: STUDENT IN AN ORGANIZED HEALTH CARE EDUCATION/TRAINING PROGRAM | Admitting: STUDENT IN AN ORGANIZED HEALTH CARE EDUCATION/TRAINING PROGRAM
Payer: COMMERCIAL

## 2025-08-19 ENCOUNTER — APPOINTMENT (OUTPATIENT)
Dept: GENERAL RADIOLOGY | Age: 80
End: 2025-08-19
Payer: COMMERCIAL

## 2025-08-19 ENCOUNTER — APPOINTMENT (OUTPATIENT)
Age: 80
End: 2025-08-19
Attending: STUDENT IN AN ORGANIZED HEALTH CARE EDUCATION/TRAINING PROGRAM
Payer: COMMERCIAL

## 2025-08-19 DIAGNOSIS — J96.01 ACUTE RESPIRATORY FAILURE WITH HYPOXIA (HCC): ICD-10-CM

## 2025-08-19 DIAGNOSIS — I48.19 PERSISTENT ATRIAL FIBRILLATION (HCC): ICD-10-CM

## 2025-08-19 DIAGNOSIS — I48.92 ATRIAL FLUTTER WITH RAPID VENTRICULAR RESPONSE (HCC): Primary | ICD-10-CM

## 2025-08-19 DIAGNOSIS — I50.9 ACUTE DECOMPENSATED HEART FAILURE (HCC): ICD-10-CM

## 2025-08-19 PROBLEM — J81.0 ACUTE PULMONARY EDEMA (HCC): Status: ACTIVE | Noted: 2025-08-19

## 2025-08-19 PROBLEM — I48.91 ATRIAL FIBRILLATION (HCC): Status: ACTIVE | Noted: 2025-08-19

## 2025-08-19 LAB
ALBUMIN SERPL-MCNC: 4.3 G/DL (ref 3.5–4.6)
ALP SERPL-CCNC: 77 U/L (ref 40–130)
ALT SERPL-CCNC: 9 U/L (ref 0–33)
ANION GAP SERPL CALCULATED.3IONS-SCNC: 13 MEQ/L (ref 9–15)
APTT PPP: 33 SEC (ref 24.4–36.8)
AST SERPL-CCNC: 16 U/L (ref 0–35)
BASE EXCESS ARTERIAL: -1 (ref -3–3)
BASOPHILS # BLD: 0 K/UL (ref 0–0.2)
BASOPHILS NFR BLD: 0.3 %
BILIRUB SERPL-MCNC: 1 MG/DL (ref 0.2–0.7)
BNP BLD-MCNC: 7799 PG/ML
BUN SERPL-MCNC: 17 MG/DL (ref 8–23)
CALCIUM IONIZED: 1.2 MMOL/L (ref 1.12–1.32)
CALCIUM SERPL-MCNC: 8.6 MG/DL (ref 8.5–9.9)
CHLORIDE SERPL-SCNC: 102 MEQ/L (ref 95–107)
CK SERPL-CCNC: 38 U/L (ref 0–170)
CO2 SERPL-SCNC: 23 MEQ/L (ref 20–31)
CREAT SERPL-MCNC: 0.87 MG/DL (ref 0.5–0.9)
ECHO AO ROOT DIAM: 3.1 CM
ECHO AO ROOT INDEX: 1.76 CM/M2
ECHO AV AREA PEAK VELOCITY: 0.8 CM2
ECHO AV AREA VTI: 0.8 CM2
ECHO AV AREA/BSA PEAK VELOCITY: 0.5 CM2/M2
ECHO AV AREA/BSA VTI: 0.5 CM2/M2
ECHO AV MEAN GRADIENT: 44 MMHG
ECHO AV MEAN VELOCITY: 3.1 M/S
ECHO AV PEAK GRADIENT: 73 MMHG
ECHO AV PEAK VELOCITY: 4.3 M/S
ECHO AV VELOCITY RATIO: 0.33
ECHO AV VTI: 102.4 CM
ECHO EST RA PRESSURE: 3 MMHG
ECHO LA DIAMETER INDEX: 2.78 CM/M2
ECHO LA DIAMETER: 4.9 CM
ECHO LA TO AORTIC ROOT RATIO: 1.58
ECHO LA VOL A-L A2C: 120 ML (ref 22–52)
ECHO LA VOL A-L A4C: 107 ML (ref 22–52)
ECHO LA VOL MOD A2C: 115 ML (ref 22–52)
ECHO LA VOL MOD A4C: 100 ML (ref 22–52)
ECHO LA VOLUME AREA LENGTH: 114 ML
ECHO LA VOLUME INDEX A-L A2C: 68 ML/M2 (ref 16–34)
ECHO LA VOLUME INDEX A-L A4C: 61 ML/M2 (ref 16–34)
ECHO LA VOLUME INDEX AREA LENGTH: 65 ML/M2 (ref 16–34)
ECHO LA VOLUME INDEX MOD A2C: 65 ML/M2 (ref 16–34)
ECHO LA VOLUME INDEX MOD A4C: 57 ML/M2 (ref 16–34)
ECHO LV E' LATERAL VELOCITY: 6.39 CM/S
ECHO LV E' SEPTAL VELOCITY: 6.6 CM/S
ECHO LV EDV A2C: 64 ML
ECHO LV EDV A4C: 66 ML
ECHO LV EDV BP: 69 ML (ref 56–104)
ECHO LV EDV INDEX A4C: 38 ML/M2
ECHO LV EDV INDEX BP: 39 ML/M2
ECHO LV EDV NDEX A2C: 36 ML/M2
ECHO LV EJECTION FRACTION 3D: 65 %
ECHO LV EJECTION FRACTION A2C: 82 %
ECHO LV EJECTION FRACTION A4C: 72 %
ECHO LV EJECTION FRACTION BIPLANE: 77 % (ref 55–100)
ECHO LV ESV A2C: 12 ML
ECHO LV ESV A4C: 18 ML
ECHO LV ESV BP: 16 ML (ref 19–49)
ECHO LV ESV INDEX A2C: 7 ML/M2
ECHO LV ESV INDEX A4C: 10 ML/M2
ECHO LV ESV INDEX BP: 9 ML/M2
ECHO LV FRACTIONAL SHORTENING: 46 % (ref 28–44)
ECHO LV INTERNAL DIMENSION DIASTOLE INDEX: 2.61 CM/M2
ECHO LV INTERNAL DIMENSION DIASTOLIC: 4.6 CM (ref 3.9–5.3)
ECHO LV INTERNAL DIMENSION SYSTOLIC INDEX: 1.42 CM/M2
ECHO LV INTERNAL DIMENSION SYSTOLIC: 2.5 CM
ECHO LV IVSD: 1.5 CM (ref 0.6–0.9)
ECHO LV IVSS: 1.7 CM
ECHO LV MASS 2D: 299.5 G (ref 67–162)
ECHO LV MASS INDEX 2D: 170.1 G/M2 (ref 43–95)
ECHO LV POSTERIOR WALL DIASTOLIC: 1.6 CM (ref 0.6–0.9)
ECHO LV POSTERIOR WALL SYSTOLIC: 1.9 CM
ECHO LV RELATIVE WALL THICKNESS RATIO: 0.7
ECHO LVOT AREA: 2.5 CM2
ECHO LVOT AV VTI INDEX: 0.33
ECHO LVOT DIAM: 1.8 CM
ECHO LVOT MEAN GRADIENT: 4 MMHG
ECHO LVOT PEAK GRADIENT: 7 MMHG
ECHO LVOT PEAK VELOCITY: 1.4 M/S
ECHO LVOT STROKE VOLUME INDEX: 49.1 ML/M2
ECHO LVOT SV: 86.5 ML
ECHO LVOT VTI: 34 CM
ECHO MV A VELOCITY: 1.34 M/S
ECHO MV AREA VTI: 1.6 CM2
ECHO MV E DECELERATION TIME (DT): 236.7 MS
ECHO MV E VELOCITY: 1.64 M/S
ECHO MV E/A RATIO: 1.22
ECHO MV E/E' LATERAL: 25.67
ECHO MV E/E' RATIO (AVERAGED): 25.26
ECHO MV E/E' SEPTAL: 24.85
ECHO MV LVOT VTI INDEX: 1.61
ECHO MV MAX VELOCITY: 1.9 M/S
ECHO MV MEAN GRADIENT: 5 MMHG
ECHO MV MEAN VELOCITY: 1.1 M/S
ECHO MV PEAK GRADIENT: 14 MMHG
ECHO MV REGURGITANT PEAK GRADIENT: 85 MMHG
ECHO MV REGURGITANT PEAK VELOCITY: 4.6 M/S
ECHO MV VTI: 54.7 CM
ECHO PV MAX VELOCITY: 1.4 M/S
ECHO PV PEAK GRADIENT: 8 MMHG
ECHO RIGHT VENTRICULAR SYSTOLIC PRESSURE (RVSP): 37 MMHG
ECHO RV INTERNAL DIMENSION: 2.8 CM
ECHO RV TAPSE: 2.3 CM (ref 1.7–?)
ECHO TV REGURGITANT MAX VELOCITY: 2.92 M/S
ECHO TV REGURGITANT PEAK GRADIENT: 34 MMHG
EKG ATRIAL RATE: 178 BPM
EKG DIAGNOSIS: NORMAL
EKG Q-T INTERVAL: 288 MS
EKG QRS DURATION: 88 MS
EKG QTC CALCULATION (BAZETT): 477 MS
EKG R AXIS: -57 DEGREES
EKG T AXIS: 110 DEGREES
EKG VENTRICULAR RATE: 165 BPM
EOSINOPHIL # BLD: 0.1 K/UL (ref 0–0.7)
EOSINOPHIL NFR BLD: 0.4 %
ERYTHROCYTE [DISTWIDTH] IN BLOOD BY AUTOMATED COUNT: 12.2 % (ref 11.5–14.5)
GLOBULIN SER CALC-MCNC: 2 G/DL (ref 2.3–3.5)
GLUCOSE BLD-MCNC: 136 MG/DL (ref 70–99)
GLUCOSE SERPL-MCNC: 125 MG/DL (ref 70–99)
HCO3 ARTERIAL: 24.3 MMOL/L (ref 21–29)
HCT VFR BLD AUTO: 40 % (ref 36–48)
HCT VFR BLD AUTO: 40.9 % (ref 37–47)
HGB BLD CALC-MCNC: 13.6 GM/DL (ref 12–16)
HGB BLD-MCNC: 13.2 G/DL (ref 12–16)
INR PPP: 1.5
LACTATE: 0.89 MMOL/L (ref 0.4–2)
LACTIC ACID, SEPSIS: 1.7 MMOL/L (ref 0.5–1.9)
LYMPHOCYTES # BLD: 3.3 K/UL (ref 1–4.8)
LYMPHOCYTES NFR BLD: 27.4 %
MAGNESIUM SERPL-MCNC: 1.4 MG/DL (ref 1.7–2.4)
MCH RBC QN AUTO: 31.1 PG (ref 27–31.3)
MCHC RBC AUTO-ENTMCNC: 32.3 % (ref 33–37)
MCV RBC AUTO: 96.2 FL (ref 79.4–94.8)
MONOCYTES # BLD: 1 K/UL (ref 0.2–0.8)
MONOCYTES NFR BLD: 8.7 %
NEUTROPHILS # BLD: 7.5 K/UL (ref 1.4–6.5)
NEUTS SEG NFR BLD: 62.9 %
O2 SAT, ARTERIAL: 100 % (ref 93–100)
PCO2 ARTERIAL: 41 MM HG (ref 35–45)
PERFORMED ON: ABNORMAL
PERFORMED ON: NORMAL
PH ARTERIAL: 7.38 (ref 7.35–7.45)
PLATELET # BLD AUTO: 152 K/UL (ref 130–400)
PO2 ARTERIAL: 291 MM HG (ref 75–108)
POC CHLORIDE: 106 MEQ/L (ref 99–110)
POC CREATININE: 0.8 MG/DL (ref 0.6–1.2)
POC CREATININE: 0.9 MG/DL (ref 0.6–1.2)
POC FIO2: 80
POC SAMPLE TYPE: ABNORMAL
POC SAMPLE TYPE: NORMAL
POTASSIUM SERPL-SCNC: 3.9 MEQ/L (ref 3.4–4.9)
POTASSIUM SERPL-SCNC: 3.9 MEQ/L (ref 3.5–5.1)
PROCALCITONIN SERPL IA-MCNC: 0.04 NG/ML (ref 0–0.15)
PROT SERPL-MCNC: 6.3 G/DL (ref 6.3–8)
PROTHROMBIN TIME: 19 SEC (ref 12.3–14.9)
RBC # BLD AUTO: 4.25 M/UL (ref 4.2–5.4)
SODIUM BLD-SCNC: 141 MEQ/L (ref 136–145)
SODIUM SERPL-SCNC: 138 MEQ/L (ref 135–144)
TCO2 ARTERIAL: 26 MMOL/L (ref 21–32)
TROPONIN, HIGH SENSITIVITY: 18 NG/L (ref 0–19)
TSH REFLEX: 1.33 UIU/ML (ref 0.44–3.86)
WBC # BLD AUTO: 11.9 K/UL (ref 4.8–10.8)

## 2025-08-19 PROCEDURE — 6360000002 HC RX W HCPCS: Performed by: INTERNAL MEDICINE

## 2025-08-19 PROCEDURE — 85730 THROMBOPLASTIN TIME PARTIAL: CPT

## 2025-08-19 PROCEDURE — 82565 ASSAY OF CREATININE: CPT

## 2025-08-19 PROCEDURE — 80053 COMPREHEN METABOLIC PANEL: CPT

## 2025-08-19 PROCEDURE — 2580000003 HC RX 258: Performed by: STUDENT IN AN ORGANIZED HEALTH CARE EDUCATION/TRAINING PROGRAM

## 2025-08-19 PROCEDURE — 84484 ASSAY OF TROPONIN QUANT: CPT

## 2025-08-19 PROCEDURE — 82330 ASSAY OF CALCIUM: CPT

## 2025-08-19 PROCEDURE — 96375 TX/PRO/DX INJ NEW DRUG ADDON: CPT

## 2025-08-19 PROCEDURE — 96365 THER/PROPH/DIAG IV INF INIT: CPT

## 2025-08-19 PROCEDURE — 2500000003 HC RX 250 WO HCPCS: Performed by: STUDENT IN AN ORGANIZED HEALTH CARE EDUCATION/TRAINING PROGRAM

## 2025-08-19 PROCEDURE — 82435 ASSAY OF BLOOD CHLORIDE: CPT

## 2025-08-19 PROCEDURE — 82803 BLOOD GASES ANY COMBINATION: CPT

## 2025-08-19 PROCEDURE — 93306 TTE W/DOPPLER COMPLETE: CPT

## 2025-08-19 PROCEDURE — 85610 PROTHROMBIN TIME: CPT

## 2025-08-19 PROCEDURE — 93306 TTE W/DOPPLER COMPLETE: CPT | Performed by: INTERNAL MEDICINE

## 2025-08-19 PROCEDURE — 6370000000 HC RX 637 (ALT 250 FOR IP): Performed by: STUDENT IN AN ORGANIZED HEALTH CARE EDUCATION/TRAINING PROGRAM

## 2025-08-19 PROCEDURE — 83880 ASSAY OF NATRIURETIC PEPTIDE: CPT

## 2025-08-19 PROCEDURE — 83605 ASSAY OF LACTIC ACID: CPT

## 2025-08-19 PROCEDURE — 6360000002 HC RX W HCPCS: Performed by: STUDENT IN AN ORGANIZED HEALTH CARE EDUCATION/TRAINING PROGRAM

## 2025-08-19 PROCEDURE — 36600 WITHDRAWAL OF ARTERIAL BLOOD: CPT

## 2025-08-19 PROCEDURE — 5A09357 ASSISTANCE WITH RESPIRATORY VENTILATION, LESS THAN 24 CONSECUTIVE HOURS, CONTINUOUS POSITIVE AIRWAY PRESSURE: ICD-10-PCS

## 2025-08-19 PROCEDURE — 99223 1ST HOSP IP/OBS HIGH 75: CPT | Performed by: INTERNAL MEDICINE

## 2025-08-19 PROCEDURE — 99285 EMERGENCY DEPT VISIT HI MDM: CPT

## 2025-08-19 PROCEDURE — 93005 ELECTROCARDIOGRAM TRACING: CPT | Performed by: STUDENT IN AN ORGANIZED HEALTH CARE EDUCATION/TRAINING PROGRAM

## 2025-08-19 PROCEDURE — 85014 HEMATOCRIT: CPT

## 2025-08-19 PROCEDURE — 82550 ASSAY OF CK (CPK): CPT

## 2025-08-19 PROCEDURE — 84145 PROCALCITONIN (PCT): CPT

## 2025-08-19 PROCEDURE — 85025 COMPLETE CBC W/AUTO DIFF WBC: CPT

## 2025-08-19 PROCEDURE — 84132 ASSAY OF SERUM POTASSIUM: CPT

## 2025-08-19 PROCEDURE — 71045 X-RAY EXAM CHEST 1 VIEW: CPT

## 2025-08-19 PROCEDURE — 5A2204Z RESTORATION OF CARDIAC RHYTHM, SINGLE: ICD-10-PCS | Performed by: INTERNAL MEDICINE

## 2025-08-19 PROCEDURE — 84295 ASSAY OF SERUM SODIUM: CPT

## 2025-08-19 PROCEDURE — 83735 ASSAY OF MAGNESIUM: CPT

## 2025-08-19 PROCEDURE — 84443 ASSAY THYROID STIM HORMONE: CPT

## 2025-08-19 PROCEDURE — 94660 CPAP INITIATION&MGMT: CPT

## 2025-08-19 PROCEDURE — 87040 BLOOD CULTURE FOR BACTERIA: CPT

## 2025-08-19 PROCEDURE — 2060000000 HC ICU INTERMEDIATE R&B

## 2025-08-19 PROCEDURE — 93010 ELECTROCARDIOGRAM REPORT: CPT | Performed by: INTERNAL MEDICINE

## 2025-08-19 RX ORDER — ONDANSETRON 4 MG/1
4 TABLET, ORALLY DISINTEGRATING ORAL EVERY 8 HOURS PRN
Status: DISCONTINUED | OUTPATIENT
Start: 2025-08-19 | End: 2025-08-22 | Stop reason: HOSPADM

## 2025-08-19 RX ORDER — ESMOLOL HYDROCHLORIDE 10 MG/ML
25-300 INJECTION, SOLUTION INTRAVENOUS CONTINUOUS
Status: DISCONTINUED | OUTPATIENT
Start: 2025-08-19 | End: 2025-08-19

## 2025-08-19 RX ORDER — SULFAMETHOXAZOLE AND TRIMETHOPRIM 400; 80 MG/1; MG/1
1 TABLET ORAL DAILY
COMMUNITY

## 2025-08-19 RX ORDER — POTASSIUM CHLORIDE 7.45 MG/ML
10 INJECTION INTRAVENOUS PRN
Status: DISCONTINUED | OUTPATIENT
Start: 2025-08-19 | End: 2025-08-22 | Stop reason: HOSPADM

## 2025-08-19 RX ORDER — ENOXAPARIN SODIUM 100 MG/ML
1 INJECTION SUBCUTANEOUS 2 TIMES DAILY
Status: DISCONTINUED | OUTPATIENT
Start: 2025-08-19 | End: 2025-08-22 | Stop reason: HOSPADM

## 2025-08-19 RX ORDER — PROPOFOL 10 MG/ML
40 INJECTION, EMULSION INTRAVENOUS ONCE
Status: COMPLETED | OUTPATIENT
Start: 2025-08-19 | End: 2025-08-19

## 2025-08-19 RX ORDER — LANOLIN ALCOHOL/MO/W.PET/CERES
400 CREAM (GRAM) TOPICAL DAILY
Status: DISCONTINUED | OUTPATIENT
Start: 2025-08-19 | End: 2025-08-22 | Stop reason: HOSPADM

## 2025-08-19 RX ORDER — ASPIRIN 81 MG/1
81 TABLET ORAL DAILY
Status: DISCONTINUED | OUTPATIENT
Start: 2025-08-19 | End: 2025-08-22 | Stop reason: HOSPADM

## 2025-08-19 RX ORDER — MYCOPHENOLATE MOFETIL 250 MG/1
250 CAPSULE ORAL 2 TIMES DAILY
Status: DISCONTINUED | OUTPATIENT
Start: 2025-08-19 | End: 2025-08-22 | Stop reason: HOSPADM

## 2025-08-19 RX ORDER — CINACALCET 30 MG/1
30 TABLET, FILM COATED ORAL DAILY
Status: DISCONTINUED | OUTPATIENT
Start: 2025-08-19 | End: 2025-08-22 | Stop reason: HOSPADM

## 2025-08-19 RX ORDER — PROPOFOL 10 MG/ML
INJECTION, EMULSION INTRAVENOUS
Status: DISPENSED
Start: 2025-08-19 | End: 2025-08-19

## 2025-08-19 RX ORDER — PANTOPRAZOLE SODIUM 40 MG/1
40 TABLET, DELAYED RELEASE ORAL
Status: DISCONTINUED | OUTPATIENT
Start: 2025-08-20 | End: 2025-08-22 | Stop reason: HOSPADM

## 2025-08-19 RX ORDER — ACETAMINOPHEN 325 MG/1
650 TABLET ORAL EVERY 6 HOURS PRN
Status: DISCONTINUED | OUTPATIENT
Start: 2025-08-19 | End: 2025-08-22 | Stop reason: HOSPADM

## 2025-08-19 RX ORDER — CINACALCET 30 MG/1
30 TABLET, FILM COATED ORAL DAILY
COMMUNITY

## 2025-08-19 RX ORDER — ACETAMINOPHEN 650 MG/1
650 SUPPOSITORY RECTAL EVERY 6 HOURS PRN
Status: DISCONTINUED | OUTPATIENT
Start: 2025-08-19 | End: 2025-08-22 | Stop reason: HOSPADM

## 2025-08-19 RX ORDER — FUROSEMIDE 10 MG/ML
20 INJECTION INTRAMUSCULAR; INTRAVENOUS 2 TIMES DAILY
Status: DISCONTINUED | OUTPATIENT
Start: 2025-08-19 | End: 2025-08-20

## 2025-08-19 RX ORDER — MAGNESIUM SULFATE IN WATER 40 MG/ML
2000 INJECTION, SOLUTION INTRAVENOUS ONCE
Status: COMPLETED | OUTPATIENT
Start: 2025-08-19 | End: 2025-08-19

## 2025-08-19 RX ORDER — PREDNISONE 5 MG/1
5 TABLET ORAL DAILY
Status: DISCONTINUED | OUTPATIENT
Start: 2025-08-19 | End: 2025-08-22 | Stop reason: HOSPADM

## 2025-08-19 RX ORDER — LORAZEPAM 2 MG/ML
0.5 INJECTION INTRAMUSCULAR ONCE
Status: COMPLETED | OUTPATIENT
Start: 2025-08-19 | End: 2025-08-19

## 2025-08-19 RX ORDER — MAGNESIUM SULFATE IN WATER 40 MG/ML
2000 INJECTION, SOLUTION INTRAVENOUS PRN
Status: DISCONTINUED | OUTPATIENT
Start: 2025-08-19 | End: 2025-08-22 | Stop reason: HOSPADM

## 2025-08-19 RX ORDER — SODIUM CHLORIDE 9 MG/ML
INJECTION, SOLUTION INTRAVENOUS PRN
Status: DISCONTINUED | OUTPATIENT
Start: 2025-08-19 | End: 2025-08-22 | Stop reason: HOSPADM

## 2025-08-19 RX ORDER — GABAPENTIN 100 MG/1
100 CAPSULE ORAL DAILY
Status: DISCONTINUED | OUTPATIENT
Start: 2025-08-19 | End: 2025-08-22 | Stop reason: HOSPADM

## 2025-08-19 RX ORDER — LISINOPRIL 5 MG/1
5 TABLET ORAL DAILY
COMMUNITY
Start: 2025-06-19 | End: 2026-06-19

## 2025-08-19 RX ORDER — OXYCODONE AND ACETAMINOPHEN 5; 325 MG/1; MG/1
0.5 TABLET ORAL EVERY 4 HOURS PRN
Status: DISCONTINUED | OUTPATIENT
Start: 2025-08-19 | End: 2025-08-22 | Stop reason: HOSPADM

## 2025-08-19 RX ORDER — GABAPENTIN 100 MG/1
100 CAPSULE ORAL DAILY
COMMUNITY
Start: 2025-06-21

## 2025-08-19 RX ORDER — OMEPRAZOLE 20 MG/1
20 CAPSULE, DELAYED RELEASE ORAL DAILY
COMMUNITY

## 2025-08-19 RX ORDER — POLYETHYLENE GLYCOL 3350 17 G/17G
17 POWDER, FOR SOLUTION ORAL DAILY PRN
Status: DISCONTINUED | OUTPATIENT
Start: 2025-08-19 | End: 2025-08-22 | Stop reason: HOSPADM

## 2025-08-19 RX ORDER — ATORVASTATIN CALCIUM 20 MG/1
20 TABLET, FILM COATED ORAL DAILY
Status: DISCONTINUED | OUTPATIENT
Start: 2025-08-19 | End: 2025-08-22 | Stop reason: HOSPADM

## 2025-08-19 RX ORDER — ATORVASTATIN CALCIUM 20 MG/1
20 TABLET, FILM COATED ORAL DAILY
COMMUNITY
Start: 2025-06-09

## 2025-08-19 RX ORDER — SULFAMETHOXAZOLE AND TRIMETHOPRIM 400; 80 MG/1; MG/1
1 TABLET ORAL DAILY
Status: DISCONTINUED | OUTPATIENT
Start: 2025-08-19 | End: 2025-08-22 | Stop reason: HOSPADM

## 2025-08-19 RX ORDER — ESCITALOPRAM OXALATE 10 MG/1
10 TABLET ORAL DAILY
Status: DISCONTINUED | OUTPATIENT
Start: 2025-08-19 | End: 2025-08-22 | Stop reason: HOSPADM

## 2025-08-19 RX ORDER — ONDANSETRON 2 MG/ML
4 INJECTION INTRAMUSCULAR; INTRAVENOUS EVERY 6 HOURS PRN
Status: DISCONTINUED | OUTPATIENT
Start: 2025-08-19 | End: 2025-08-22 | Stop reason: HOSPADM

## 2025-08-19 RX ORDER — SODIUM CHLORIDE 0.9 % (FLUSH) 0.9 %
5-40 SYRINGE (ML) INJECTION EVERY 12 HOURS SCHEDULED
Status: DISCONTINUED | OUTPATIENT
Start: 2025-08-19 | End: 2025-08-22 | Stop reason: HOSPADM

## 2025-08-19 RX ORDER — FUROSEMIDE 10 MG/ML
40 INJECTION INTRAMUSCULAR; INTRAVENOUS ONCE
Status: DISCONTINUED | OUTPATIENT
Start: 2025-08-19 | End: 2025-08-19

## 2025-08-19 RX ORDER — POTASSIUM CHLORIDE 1500 MG/1
40 TABLET, EXTENDED RELEASE ORAL PRN
Status: DISCONTINUED | OUTPATIENT
Start: 2025-08-19 | End: 2025-08-22 | Stop reason: HOSPADM

## 2025-08-19 RX ORDER — SODIUM CHLORIDE 0.9 % (FLUSH) 0.9 %
5-40 SYRINGE (ML) INJECTION PRN
Status: DISCONTINUED | OUTPATIENT
Start: 2025-08-19 | End: 2025-08-22 | Stop reason: HOSPADM

## 2025-08-19 RX ORDER — TACROLIMUS 1 MG/1
1 CAPSULE ORAL EVERY 12 HOURS
Status: DISCONTINUED | OUTPATIENT
Start: 2025-08-19 | End: 2025-08-22 | Stop reason: HOSPADM

## 2025-08-19 RX ADMIN — MAGNESIUM SULFATE HEPTAHYDRATE 2000 MG: 40 INJECTION, SOLUTION INTRAVENOUS at 11:55

## 2025-08-19 RX ADMIN — ESMOLOL HYDROCHLORIDE 50 MCG/KG/MIN: 10 INJECTION INTRAVENOUS at 07:33

## 2025-08-19 RX ADMIN — WATER 1000 MG: 1 INJECTION INTRAMUSCULAR; INTRAVENOUS; SUBCUTANEOUS at 07:01

## 2025-08-19 RX ADMIN — SULFAMETHOXAZOLE AND TRIMETHOPRIM 1 TABLET: 400; 80 TABLET ORAL at 11:51

## 2025-08-19 RX ADMIN — TACROLIMUS 1 MG: 1 CAPSULE ORAL at 22:19

## 2025-08-19 RX ADMIN — AZITHROMYCIN DIHYDRATE 500 MG: 500 INJECTION, POWDER, LYOPHILIZED, FOR SOLUTION INTRAVENOUS at 18:33

## 2025-08-19 RX ADMIN — ATORVASTATIN CALCIUM 20 MG: 20 TABLET, FILM COATED ORAL at 11:50

## 2025-08-19 RX ADMIN — DOXYCYCLINE 100 MG: 100 INJECTION, POWDER, LYOPHILIZED, FOR SOLUTION INTRAVENOUS at 07:08

## 2025-08-19 RX ADMIN — SODIUM CHLORIDE, PRESERVATIVE FREE 10 ML: 5 INJECTION INTRAVENOUS at 22:19

## 2025-08-19 RX ADMIN — MAGNESIUM SULFATE HEPTAHYDRATE 2000 MG: 40 INJECTION, SOLUTION INTRAVENOUS at 06:28

## 2025-08-19 RX ADMIN — Medication 0.5 MG: at 06:13

## 2025-08-19 RX ADMIN — GABAPENTIN 100 MG: 100 CAPSULE ORAL at 11:50

## 2025-08-19 RX ADMIN — FUROSEMIDE 20 MG: 10 INJECTION, SOLUTION INTRAMUSCULAR; INTRAVENOUS at 18:25

## 2025-08-19 RX ADMIN — PREDNISONE 5 MG: 5 TABLET ORAL at 11:51

## 2025-08-19 RX ADMIN — ESCITALOPRAM OXALATE 10 MG: 10 TABLET ORAL at 11:50

## 2025-08-19 RX ADMIN — AMIODARONE HYDROCHLORIDE 150 MG: 1.5 INJECTION, SOLUTION INTRAVENOUS at 06:16

## 2025-08-19 RX ADMIN — AMIODARONE HYDROCHLORIDE 1 MG/MIN: 50 INJECTION, SOLUTION INTRAVENOUS at 06:48

## 2025-08-19 RX ADMIN — PROPOFOL 40 MG: 10 INJECTION, EMULSION INTRAVENOUS at 09:30

## 2025-08-19 RX ADMIN — ASPIRIN 81 MG: 81 TABLET, DELAYED RELEASE ORAL at 11:50

## 2025-08-19 RX ADMIN — CINACALCET HYDROCHLORIDE 30 MG: 30 TABLET, FILM COATED ORAL at 11:51

## 2025-08-19 RX ADMIN — FUROSEMIDE 20 MG: 10 INJECTION, SOLUTION INTRAMUSCULAR; INTRAVENOUS at 08:08

## 2025-08-19 RX ADMIN — ENOXAPARIN SODIUM 80 MG: 100 INJECTION SUBCUTANEOUS at 22:19

## 2025-08-19 RX ADMIN — TACROLIMUS 1 MG: 1 CAPSULE ORAL at 11:50

## 2025-08-19 RX ADMIN — OXYCODONE AND ACETAMINOPHEN 0.5 TABLET: 5; 325 TABLET ORAL at 18:23

## 2025-08-19 RX ADMIN — ENOXAPARIN SODIUM 80 MG: 100 INJECTION SUBCUTANEOUS at 11:50

## 2025-08-19 RX ADMIN — Medication 400 MG: at 11:50

## 2025-08-19 RX ADMIN — AMIODARONE HYDROCHLORIDE 0.5 MG/MIN: 50 INJECTION, SOLUTION INTRAVENOUS at 14:58

## 2025-08-19 RX ADMIN — SODIUM CHLORIDE, PRESERVATIVE FREE 10 ML: 5 INJECTION INTRAVENOUS at 08:10

## 2025-08-19 RX ADMIN — MYCOPHENOLATE MOFETIL 250 MG: 250 CAPSULE ORAL at 11:51

## 2025-08-19 ASSESSMENT — PAIN - FUNCTIONAL ASSESSMENT
PAIN_FUNCTIONAL_ASSESSMENT: 0-10
PAIN_FUNCTIONAL_ASSESSMENT: PREVENTS OR INTERFERES SOME ACTIVE ACTIVITIES AND ADLS

## 2025-08-19 ASSESSMENT — ENCOUNTER SYMPTOMS: SHORTNESS OF BREATH: 1

## 2025-08-19 ASSESSMENT — PAIN DESCRIPTION - ORIENTATION: ORIENTATION: LEFT

## 2025-08-19 ASSESSMENT — PAIN DESCRIPTION - LOCATION: LOCATION: LEG

## 2025-08-19 ASSESSMENT — PAIN SCALES - GENERAL
PAINLEVEL_OUTOF10: 7
PAINLEVEL_OUTOF10: 0
PAINLEVEL_OUTOF10: 0

## 2025-08-20 ENCOUNTER — APPOINTMENT (OUTPATIENT)
Dept: GENERAL RADIOLOGY | Age: 80
End: 2025-08-20
Payer: COMMERCIAL

## 2025-08-20 PROBLEM — J96.01 ACUTE RESPIRATORY FAILURE WITH HYPOXIA (HCC): Status: ACTIVE | Noted: 2025-08-20

## 2025-08-20 LAB
ANION GAP SERPL CALCULATED.3IONS-SCNC: 11 MEQ/L (ref 9–15)
BASE EXCESS ARTERIAL: 0 (ref -3–3)
BASE EXCESS ARTERIAL: 3 (ref -3–3)
BASOPHILS # BLD: 0 K/UL (ref 0–0.2)
BASOPHILS NFR BLD: 0.1 %
BUN SERPL-MCNC: 25 MG/DL (ref 8–23)
CALCIUM IONIZED: 1.25 MMOL/L (ref 1.12–1.32)
CALCIUM IONIZED: 1.26 MMOL/L (ref 1.12–1.32)
CALCIUM SERPL-MCNC: 8.5 MG/DL (ref 8.5–9.9)
CHLORIDE SERPL-SCNC: 96 MEQ/L (ref 95–107)
CO2 SERPL-SCNC: 21 MEQ/L (ref 20–31)
CREAT SERPL-MCNC: 0.91 MG/DL (ref 0.5–0.9)
EOSINOPHIL # BLD: 0 K/UL (ref 0–0.7)
EOSINOPHIL NFR BLD: 0 %
ERYTHROCYTE [DISTWIDTH] IN BLOOD BY AUTOMATED COUNT: 12 % (ref 11.5–14.5)
GLUCOSE BLD-MCNC: 135 MG/DL (ref 70–99)
GLUCOSE BLD-MCNC: 136 MG/DL (ref 70–99)
GLUCOSE SERPL-MCNC: 145 MG/DL (ref 70–99)
HCO3 ARTERIAL: 26.3 MMOL/L (ref 21–29)
HCO3 ARTERIAL: 28.7 MMOL/L (ref 21–29)
HCT VFR BLD AUTO: 35.5 % (ref 37–47)
HCT VFR BLD AUTO: 40 % (ref 36–48)
HCT VFR BLD AUTO: 41 % (ref 36–48)
HGB BLD CALC-MCNC: 13.4 GM/DL (ref 12–16)
HGB BLD CALC-MCNC: 14 GM/DL (ref 12–16)
HGB BLD-MCNC: 11.9 G/DL (ref 12–16)
LACTATE: 0.89 MMOL/L (ref 0.4–2)
LACTATE: 1.76 MMOL/L (ref 0.4–2)
LACTIC ACID, SEPSIS: 1.3 MMOL/L (ref 0.5–1.9)
LYMPHOCYTES # BLD: 0.7 K/UL (ref 1–4.8)
LYMPHOCYTES NFR BLD: 5.2 %
MCH RBC QN AUTO: 31.4 PG (ref 27–31.3)
MCHC RBC AUTO-ENTMCNC: 33.5 % (ref 33–37)
MCV RBC AUTO: 93.7 FL (ref 79.4–94.8)
MONOCYTES # BLD: 0.6 K/UL (ref 0.2–0.8)
MONOCYTES NFR BLD: 4.2 %
NEUTROPHILS # BLD: 11.7 K/UL (ref 1.4–6.5)
NEUTS SEG NFR BLD: 90.1 %
O2 SAT, ARTERIAL: 93 % (ref 93–100)
O2 SAT, ARTERIAL: 95 % (ref 93–100)
PCO2 ARTERIAL: 55 MM HG (ref 35–45)
PCO2 ARTERIAL: 55 MM HG (ref 35–45)
PERFORMED ON: ABNORMAL
PERFORMED ON: ABNORMAL
PH ARTERIAL: 7.29 (ref 7.35–7.45)
PH ARTERIAL: 7.33 (ref 7.35–7.45)
PLATELET # BLD AUTO: 144 K/UL (ref 130–400)
PO2 ARTERIAL: 78 MM HG (ref 75–108)
PO2 ARTERIAL: 83 MM HG (ref 75–108)
POC CHLORIDE: 96 MEQ/L (ref 99–110)
POC CHLORIDE: 96 MEQ/L (ref 99–110)
POC CREATININE: 1 MG/DL (ref 0.6–1.2)
POC CREATININE: 1 MG/DL (ref 0.6–1.2)
POC FIO2: 4
POC FIO2: 40
POC SAMPLE TYPE: ABNORMAL
POC SAMPLE TYPE: ABNORMAL
POTASSIUM SERPL-SCNC: 4.2 MEQ/L (ref 3.5–5.1)
POTASSIUM SERPL-SCNC: 4.3 MEQ/L (ref 3.5–5.1)
POTASSIUM SERPL-SCNC: 5.1 MEQ/L (ref 3.4–4.9)
RBC # BLD AUTO: 3.79 M/UL (ref 4.2–5.4)
SODIUM BLD-SCNC: 133 MEQ/L (ref 136–145)
SODIUM BLD-SCNC: 135 MEQ/L (ref 136–145)
SODIUM SERPL-SCNC: 128 MEQ/L (ref 135–144)
TCO2 ARTERIAL: 28 MMOL/L (ref 21–32)
TCO2 ARTERIAL: 30 MMOL/L (ref 21–32)
TROPONIN, HIGH SENSITIVITY: 18 NG/L (ref 0–19)
WBC # BLD AUTO: 13 K/UL (ref 4.8–10.8)

## 2025-08-20 PROCEDURE — 85025 COMPLETE CBC W/AUTO DIFF WBC: CPT

## 2025-08-20 PROCEDURE — 2060000000 HC ICU INTERMEDIATE R&B

## 2025-08-20 PROCEDURE — 2500000003 HC RX 250 WO HCPCS: Performed by: STUDENT IN AN ORGANIZED HEALTH CARE EDUCATION/TRAINING PROGRAM

## 2025-08-20 PROCEDURE — 6370000000 HC RX 637 (ALT 250 FOR IP): Performed by: STUDENT IN AN ORGANIZED HEALTH CARE EDUCATION/TRAINING PROGRAM

## 2025-08-20 PROCEDURE — 6360000002 HC RX W HCPCS: Performed by: STUDENT IN AN ORGANIZED HEALTH CARE EDUCATION/TRAINING PROGRAM

## 2025-08-20 PROCEDURE — 2700000000 HC OXYGEN THERAPY PER DAY

## 2025-08-20 PROCEDURE — 82435 ASSAY OF BLOOD CHLORIDE: CPT

## 2025-08-20 PROCEDURE — 84295 ASSAY OF SERUM SODIUM: CPT

## 2025-08-20 PROCEDURE — 80048 BASIC METABOLIC PNL TOTAL CA: CPT

## 2025-08-20 PROCEDURE — 99233 SBSQ HOSP IP/OBS HIGH 50: CPT | Performed by: INTERNAL MEDICINE

## 2025-08-20 PROCEDURE — 82803 BLOOD GASES ANY COMBINATION: CPT

## 2025-08-20 PROCEDURE — 85014 HEMATOCRIT: CPT

## 2025-08-20 PROCEDURE — 6360000002 HC RX W HCPCS

## 2025-08-20 PROCEDURE — 36415 COLL VENOUS BLD VENIPUNCTURE: CPT

## 2025-08-20 PROCEDURE — 83605 ASSAY OF LACTIC ACID: CPT

## 2025-08-20 PROCEDURE — 82330 ASSAY OF CALCIUM: CPT

## 2025-08-20 PROCEDURE — 74018 RADEX ABDOMEN 1 VIEW: CPT

## 2025-08-20 PROCEDURE — 99232 SBSQ HOSP IP/OBS MODERATE 35: CPT | Performed by: INTERNAL MEDICINE

## 2025-08-20 PROCEDURE — 6370000000 HC RX 637 (ALT 250 FOR IP)

## 2025-08-20 PROCEDURE — 94660 CPAP INITIATION&MGMT: CPT

## 2025-08-20 PROCEDURE — 36600 WITHDRAWAL OF ARTERIAL BLOOD: CPT

## 2025-08-20 PROCEDURE — 2580000003 HC RX 258: Performed by: STUDENT IN AN ORGANIZED HEALTH CARE EDUCATION/TRAINING PROGRAM

## 2025-08-20 PROCEDURE — 84484 ASSAY OF TROPONIN QUANT: CPT

## 2025-08-20 PROCEDURE — 84132 ASSAY OF SERUM POTASSIUM: CPT

## 2025-08-20 PROCEDURE — 82565 ASSAY OF CREATININE: CPT

## 2025-08-20 RX ORDER — CALCIUM CARBONATE 500 MG/1
500 TABLET, CHEWABLE ORAL 3 TIMES DAILY PRN
Status: DISCONTINUED | OUTPATIENT
Start: 2025-08-20 | End: 2025-08-22 | Stop reason: HOSPADM

## 2025-08-20 RX ORDER — FUROSEMIDE 10 MG/ML
40 INJECTION INTRAMUSCULAR; INTRAVENOUS 2 TIMES DAILY
Status: DISCONTINUED | OUTPATIENT
Start: 2025-08-20 | End: 2025-08-22 | Stop reason: HOSPADM

## 2025-08-20 RX ORDER — FUROSEMIDE 10 MG/ML
40 INJECTION INTRAMUSCULAR; INTRAVENOUS ONCE
Status: COMPLETED | OUTPATIENT
Start: 2025-08-20 | End: 2025-08-20

## 2025-08-20 RX ORDER — AMIODARONE HYDROCHLORIDE 200 MG/1
200 TABLET ORAL DAILY
Status: DISCONTINUED | OUTPATIENT
Start: 2025-08-20 | End: 2025-08-22 | Stop reason: HOSPADM

## 2025-08-20 RX ORDER — LORAZEPAM 2 MG/ML
0.5 INJECTION INTRAMUSCULAR EVERY 6 HOURS PRN
Status: DISCONTINUED | OUTPATIENT
Start: 2025-08-20 | End: 2025-08-22 | Stop reason: HOSPADM

## 2025-08-20 RX ADMIN — PREDNISONE 5 MG: 5 TABLET ORAL at 09:16

## 2025-08-20 RX ADMIN — CINACALCET HYDROCHLORIDE 30 MG: 30 TABLET, FILM COATED ORAL at 09:16

## 2025-08-20 RX ADMIN — FUROSEMIDE 40 MG: 10 INJECTION, SOLUTION INTRAMUSCULAR; INTRAVENOUS at 10:48

## 2025-08-20 RX ADMIN — ANTACID TABLETS 500 MG: 500 TABLET, CHEWABLE ORAL at 09:16

## 2025-08-20 RX ADMIN — ESCITALOPRAM OXALATE 10 MG: 10 TABLET ORAL at 09:16

## 2025-08-20 RX ADMIN — SODIUM CHLORIDE, PRESERVATIVE FREE 10 ML: 5 INJECTION INTRAVENOUS at 09:19

## 2025-08-20 RX ADMIN — AMIODARONE HYDROCHLORIDE 200 MG: 200 TABLET ORAL at 10:56

## 2025-08-20 RX ADMIN — Medication 400 MG: at 09:16

## 2025-08-20 RX ADMIN — MYCOPHENOLATE MOFETIL 250 MG: 250 CAPSULE ORAL at 02:07

## 2025-08-20 RX ADMIN — WATER 1000 MG: 1 INJECTION INTRAMUSCULAR; INTRAVENOUS; SUBCUTANEOUS at 05:26

## 2025-08-20 RX ADMIN — ASPIRIN 81 MG: 81 TABLET, DELAYED RELEASE ORAL at 09:16

## 2025-08-20 RX ADMIN — FUROSEMIDE 40 MG: 10 INJECTION, SOLUTION INTRAMUSCULAR; INTRAVENOUS at 08:44

## 2025-08-20 RX ADMIN — SODIUM CHLORIDE, PRESERVATIVE FREE 10 ML: 5 INJECTION INTRAVENOUS at 21:42

## 2025-08-20 RX ADMIN — GABAPENTIN 100 MG: 100 CAPSULE ORAL at 09:16

## 2025-08-20 RX ADMIN — MYCOPHENOLATE MOFETIL 250 MG: 250 CAPSULE ORAL at 21:42

## 2025-08-20 RX ADMIN — TACROLIMUS 1 MG: 1 CAPSULE ORAL at 09:16

## 2025-08-20 RX ADMIN — Medication 0.5 MG: at 17:56

## 2025-08-20 RX ADMIN — AZITHROMYCIN DIHYDRATE 500 MG: 500 INJECTION, POWDER, LYOPHILIZED, FOR SOLUTION INTRAVENOUS at 17:51

## 2025-08-20 RX ADMIN — TACROLIMUS 1 MG: 1 CAPSULE ORAL at 21:42

## 2025-08-20 RX ADMIN — Medication 0.5 MG: at 12:47

## 2025-08-20 RX ADMIN — ENOXAPARIN SODIUM 80 MG: 100 INJECTION SUBCUTANEOUS at 08:47

## 2025-08-20 RX ADMIN — ONDANSETRON 4 MG: 2 INJECTION, SOLUTION INTRAMUSCULAR; INTRAVENOUS at 08:44

## 2025-08-20 RX ADMIN — FUROSEMIDE 40 MG: 10 INJECTION, SOLUTION INTRAMUSCULAR; INTRAVENOUS at 17:51

## 2025-08-20 RX ADMIN — SULFAMETHOXAZOLE AND TRIMETHOPRIM 1 TABLET: 400; 80 TABLET ORAL at 09:16

## 2025-08-20 RX ADMIN — PANTOPRAZOLE SODIUM 40 MG: 40 TABLET, DELAYED RELEASE ORAL at 06:19

## 2025-08-20 RX ADMIN — MYCOPHENOLATE MOFETIL 250 MG: 250 CAPSULE ORAL at 10:14

## 2025-08-20 RX ADMIN — ENOXAPARIN SODIUM 80 MG: 100 INJECTION SUBCUTANEOUS at 21:42

## 2025-08-20 RX ADMIN — ATORVASTATIN CALCIUM 20 MG: 20 TABLET, FILM COATED ORAL at 09:16

## 2025-08-20 ASSESSMENT — PAIN SCALES - GENERAL: PAINLEVEL_OUTOF10: 0

## 2025-08-21 ENCOUNTER — APPOINTMENT (OUTPATIENT)
Dept: GENERAL RADIOLOGY | Age: 80
End: 2025-08-21
Payer: COMMERCIAL

## 2025-08-21 LAB
ANION GAP SERPL CALCULATED.3IONS-SCNC: 9 MEQ/L (ref 9–15)
BASOPHILS # BLD: 0 K/UL (ref 0–0.2)
BASOPHILS NFR BLD: 0.1 %
BILIRUB UR QL STRIP: NEGATIVE
BUN SERPL-MCNC: 26 MG/DL (ref 8–23)
CALCIUM SERPL-MCNC: 9.1 MG/DL (ref 8.5–9.9)
CHLORIDE SERPL-SCNC: 98 MEQ/L (ref 95–107)
CLARITY UR: CLEAR
CO2 SERPL-SCNC: 28 MEQ/L (ref 20–31)
COLOR UR: YELLOW
CREAT SERPL-MCNC: 0.91 MG/DL (ref 0.5–0.9)
EOSINOPHIL # BLD: 0 K/UL (ref 0–0.7)
EOSINOPHIL NFR BLD: 0.1 %
ERYTHROCYTE [DISTWIDTH] IN BLOOD BY AUTOMATED COUNT: 12.5 % (ref 11.5–14.5)
GLUCOSE SERPL-MCNC: 108 MG/DL (ref 70–99)
GLUCOSE UR STRIP-MCNC: NEGATIVE MG/DL
HCT VFR BLD AUTO: 36.6 % (ref 37–47)
HGB BLD-MCNC: 12.2 G/DL (ref 12–16)
HGB UR QL STRIP: NEGATIVE
KETONES UR STRIP-MCNC: NEGATIVE MG/DL
LEUKOCYTE ESTERASE UR QL STRIP: NEGATIVE
LYMPHOCYTES # BLD: 0.7 K/UL (ref 1–4.8)
LYMPHOCYTES NFR BLD: 4.8 %
MCH RBC QN AUTO: 31 PG (ref 27–31.3)
MCHC RBC AUTO-ENTMCNC: 33.3 % (ref 33–37)
MCV RBC AUTO: 92.9 FL (ref 79.4–94.8)
MONOCYTES # BLD: 0.9 K/UL (ref 0.2–0.8)
MONOCYTES NFR BLD: 6.6 %
NEUTROPHILS # BLD: 12.2 K/UL (ref 1.4–6.5)
NEUTS SEG NFR BLD: 88 %
NITRITE UR QL STRIP: NEGATIVE
PH UR STRIP: 5.5 [PH] (ref 5–9)
PLATELET # BLD AUTO: 197 K/UL (ref 130–400)
POTASSIUM SERPL-SCNC: 4.5 MEQ/L (ref 3.4–4.9)
PROCALCITONIN SERPL IA-MCNC: 0.09 NG/ML (ref 0–0.15)
PROT UR STRIP-MCNC: NEGATIVE MG/DL
RBC # BLD AUTO: 3.94 M/UL (ref 4.2–5.4)
REASON FOR REJECTION: NORMAL
REJECTED TEST: NORMAL
SODIUM SERPL-SCNC: 135 MEQ/L (ref 135–144)
SP GR UR STRIP: 1.01 (ref 1–1.03)
URINE REFLEX TO CULTURE: NORMAL
UROBILINOGEN UR STRIP-ACNC: 0.2 E.U./DL
WBC # BLD AUTO: 13.9 K/UL (ref 4.8–10.8)

## 2025-08-21 PROCEDURE — 97166 OT EVAL MOD COMPLEX 45 MIN: CPT

## 2025-08-21 PROCEDURE — 71045 X-RAY EXAM CHEST 1 VIEW: CPT

## 2025-08-21 PROCEDURE — 6370000000 HC RX 637 (ALT 250 FOR IP): Performed by: STUDENT IN AN ORGANIZED HEALTH CARE EDUCATION/TRAINING PROGRAM

## 2025-08-21 PROCEDURE — 85025 COMPLETE CBC W/AUTO DIFF WBC: CPT

## 2025-08-21 PROCEDURE — 81003 URINALYSIS AUTO W/O SCOPE: CPT

## 2025-08-21 PROCEDURE — 80048 BASIC METABOLIC PNL TOTAL CA: CPT

## 2025-08-21 PROCEDURE — 99232 SBSQ HOSP IP/OBS MODERATE 35: CPT | Performed by: INTERNAL MEDICINE

## 2025-08-21 PROCEDURE — 2580000003 HC RX 258: Performed by: STUDENT IN AN ORGANIZED HEALTH CARE EDUCATION/TRAINING PROGRAM

## 2025-08-21 PROCEDURE — 6360000002 HC RX W HCPCS: Performed by: STUDENT IN AN ORGANIZED HEALTH CARE EDUCATION/TRAINING PROGRAM

## 2025-08-21 PROCEDURE — 2700000000 HC OXYGEN THERAPY PER DAY

## 2025-08-21 PROCEDURE — 6370000000 HC RX 637 (ALT 250 FOR IP)

## 2025-08-21 PROCEDURE — 94660 CPAP INITIATION&MGMT: CPT

## 2025-08-21 PROCEDURE — 94761 N-INVAS EAR/PLS OXIMETRY MLT: CPT

## 2025-08-21 PROCEDURE — 2500000003 HC RX 250 WO HCPCS: Performed by: STUDENT IN AN ORGANIZED HEALTH CARE EDUCATION/TRAINING PROGRAM

## 2025-08-21 PROCEDURE — 84145 PROCALCITONIN (PCT): CPT

## 2025-08-21 PROCEDURE — 2060000000 HC ICU INTERMEDIATE R&B

## 2025-08-21 PROCEDURE — 36415 COLL VENOUS BLD VENIPUNCTURE: CPT

## 2025-08-21 PROCEDURE — 99233 SBSQ HOSP IP/OBS HIGH 50: CPT | Performed by: INTERNAL MEDICINE

## 2025-08-21 PROCEDURE — 6360000002 HC RX W HCPCS: Performed by: INTERNAL MEDICINE

## 2025-08-21 RX ORDER — LACTULOSE 10 G/15ML
20 SOLUTION ORAL 3 TIMES DAILY
Status: DISPENSED | OUTPATIENT
Start: 2025-08-21 | End: 2025-08-21

## 2025-08-21 RX ORDER — FUROSEMIDE 10 MG/ML
40 INJECTION INTRAMUSCULAR; INTRAVENOUS ONCE
Status: COMPLETED | OUTPATIENT
Start: 2025-08-21 | End: 2025-08-21

## 2025-08-21 RX ADMIN — FUROSEMIDE 40 MG: 10 INJECTION, SOLUTION INTRAMUSCULAR; INTRAVENOUS at 07:40

## 2025-08-21 RX ADMIN — AMIODARONE HYDROCHLORIDE 200 MG: 200 TABLET ORAL at 07:41

## 2025-08-21 RX ADMIN — FUROSEMIDE 40 MG: 10 INJECTION, SOLUTION INTRAMUSCULAR; INTRAVENOUS at 10:38

## 2025-08-21 RX ADMIN — MYCOPHENOLATE MOFETIL 250 MG: 250 CAPSULE ORAL at 21:01

## 2025-08-21 RX ADMIN — FUROSEMIDE 40 MG: 10 INJECTION, SOLUTION INTRAMUSCULAR; INTRAVENOUS at 17:27

## 2025-08-21 RX ADMIN — GABAPENTIN 100 MG: 100 CAPSULE ORAL at 07:40

## 2025-08-21 RX ADMIN — TACROLIMUS 1 MG: 1 CAPSULE ORAL at 07:40

## 2025-08-21 RX ADMIN — Medication 400 MG: at 07:41

## 2025-08-21 RX ADMIN — CINACALCET HYDROCHLORIDE 30 MG: 30 TABLET, FILM COATED ORAL at 07:40

## 2025-08-21 RX ADMIN — PREDNISONE 5 MG: 5 TABLET ORAL at 07:41

## 2025-08-21 RX ADMIN — TACROLIMUS 1 MG: 1 CAPSULE ORAL at 21:01

## 2025-08-21 RX ADMIN — ENOXAPARIN SODIUM 80 MG: 100 INJECTION SUBCUTANEOUS at 21:01

## 2025-08-21 RX ADMIN — PANTOPRAZOLE SODIUM 40 MG: 40 TABLET, DELAYED RELEASE ORAL at 06:31

## 2025-08-21 RX ADMIN — ATORVASTATIN CALCIUM 20 MG: 20 TABLET, FILM COATED ORAL at 07:41

## 2025-08-21 RX ADMIN — ACETAMINOPHEN 650 MG: 325 TABLET ORAL at 07:41

## 2025-08-21 RX ADMIN — ENOXAPARIN SODIUM 80 MG: 100 INJECTION SUBCUTANEOUS at 07:40

## 2025-08-21 RX ADMIN — MYCOPHENOLATE MOFETIL 250 MG: 250 CAPSULE ORAL at 07:41

## 2025-08-21 RX ADMIN — ASPIRIN 81 MG: 81 TABLET, DELAYED RELEASE ORAL at 07:40

## 2025-08-21 RX ADMIN — SODIUM CHLORIDE, PRESERVATIVE FREE 10 ML: 5 INJECTION INTRAVENOUS at 21:01

## 2025-08-21 RX ADMIN — WATER 1000 MG: 1 INJECTION INTRAMUSCULAR; INTRAVENOUS; SUBCUTANEOUS at 10:38

## 2025-08-21 RX ADMIN — LACTULOSE 20 G: 20 SOLUTION ORAL at 07:52

## 2025-08-21 RX ADMIN — AZITHROMYCIN DIHYDRATE 500 MG: 500 INJECTION, POWDER, LYOPHILIZED, FOR SOLUTION INTRAVENOUS at 17:27

## 2025-08-21 RX ADMIN — ESCITALOPRAM OXALATE 10 MG: 10 TABLET ORAL at 07:41

## 2025-08-21 RX ADMIN — SULFAMETHOXAZOLE AND TRIMETHOPRIM 1 TABLET: 400; 80 TABLET ORAL at 07:40

## 2025-08-21 ASSESSMENT — PAIN DESCRIPTION - LOCATION: LOCATION: HEAD

## 2025-08-21 ASSESSMENT — PAIN DESCRIPTION - DESCRIPTORS: DESCRIPTORS: ACHING

## 2025-08-21 ASSESSMENT — PAIN - FUNCTIONAL ASSESSMENT: PAIN_FUNCTIONAL_ASSESSMENT: 0-10

## 2025-08-21 ASSESSMENT — PAIN SCALES - GENERAL
PAINLEVEL_OUTOF10: 4
PAINLEVEL_OUTOF10: 0

## 2025-08-22 VITALS
DIASTOLIC BLOOD PRESSURE: 62 MMHG | WEIGHT: 173 LBS | TEMPERATURE: 98.6 F | BODY MASS INDEX: 33.96 KG/M2 | OXYGEN SATURATION: 95 % | SYSTOLIC BLOOD PRESSURE: 126 MMHG | HEIGHT: 60 IN | RESPIRATION RATE: 18 BRPM | HEART RATE: 72 BPM

## 2025-08-22 LAB
ANION GAP SERPL CALCULATED.3IONS-SCNC: 14 MEQ/L (ref 9–15)
BASOPHILS # BLD: 0 K/UL (ref 0–0.2)
BASOPHILS NFR BLD: 0.1 %
BUN SERPL-MCNC: 24 MG/DL (ref 8–23)
CALCIUM SERPL-MCNC: 9.2 MG/DL (ref 8.5–9.9)
CHLORIDE SERPL-SCNC: 96 MEQ/L (ref 95–107)
CO2 SERPL-SCNC: 28 MEQ/L (ref 20–31)
CREAT SERPL-MCNC: 0.98 MG/DL (ref 0.5–0.9)
EOSINOPHIL # BLD: 0.1 K/UL (ref 0–0.7)
EOSINOPHIL NFR BLD: 0.6 %
ERYTHROCYTE [DISTWIDTH] IN BLOOD BY AUTOMATED COUNT: 12.1 % (ref 11.5–14.5)
GLUCOSE SERPL-MCNC: 88 MG/DL (ref 70–99)
HCT VFR BLD AUTO: 36.3 % (ref 37–47)
HGB BLD-MCNC: 11.9 G/DL (ref 12–16)
LYMPHOCYTES # BLD: 1.1 K/UL (ref 1–4.8)
LYMPHOCYTES NFR BLD: 13 %
MCH RBC QN AUTO: 30.8 PG (ref 27–31.3)
MCHC RBC AUTO-ENTMCNC: 32.8 % (ref 33–37)
MCV RBC AUTO: 94 FL (ref 79.4–94.8)
MONOCYTES # BLD: 0.8 K/UL (ref 0.2–0.8)
MONOCYTES NFR BLD: 9.6 %
NEUTROPHILS # BLD: 6.5 K/UL (ref 1.4–6.5)
NEUTS SEG NFR BLD: 76.1 %
PLATELET # BLD AUTO: 168 K/UL (ref 130–400)
POTASSIUM SERPL-SCNC: 4 MEQ/L (ref 3.4–4.9)
RBC # BLD AUTO: 3.86 M/UL (ref 4.2–5.4)
SODIUM SERPL-SCNC: 138 MEQ/L (ref 135–144)
WBC # BLD AUTO: 8.5 K/UL (ref 4.8–10.8)

## 2025-08-22 PROCEDURE — 99232 SBSQ HOSP IP/OBS MODERATE 35: CPT | Performed by: INTERNAL MEDICINE

## 2025-08-22 PROCEDURE — 80048 BASIC METABOLIC PNL TOTAL CA: CPT

## 2025-08-22 PROCEDURE — 2500000003 HC RX 250 WO HCPCS: Performed by: STUDENT IN AN ORGANIZED HEALTH CARE EDUCATION/TRAINING PROGRAM

## 2025-08-22 PROCEDURE — 6370000000 HC RX 637 (ALT 250 FOR IP): Performed by: STUDENT IN AN ORGANIZED HEALTH CARE EDUCATION/TRAINING PROGRAM

## 2025-08-22 PROCEDURE — 36415 COLL VENOUS BLD VENIPUNCTURE: CPT

## 2025-08-22 PROCEDURE — 6360000002 HC RX W HCPCS: Performed by: STUDENT IN AN ORGANIZED HEALTH CARE EDUCATION/TRAINING PROGRAM

## 2025-08-22 PROCEDURE — 6370000000 HC RX 637 (ALT 250 FOR IP)

## 2025-08-22 PROCEDURE — 6370000000 HC RX 637 (ALT 250 FOR IP): Performed by: INTERNAL MEDICINE

## 2025-08-22 PROCEDURE — 2700000000 HC OXYGEN THERAPY PER DAY

## 2025-08-22 PROCEDURE — 85025 COMPLETE CBC W/AUTO DIFF WBC: CPT

## 2025-08-22 RX ORDER — METOPROLOL TARTRATE 25 MG/1
25 TABLET, FILM COATED ORAL 2 TIMES DAILY
Status: DISCONTINUED | OUTPATIENT
Start: 2025-08-22 | End: 2025-08-22 | Stop reason: HOSPADM

## 2025-08-22 RX ADMIN — WATER 1000 MG: 1 INJECTION INTRAMUSCULAR; INTRAVENOUS; SUBCUTANEOUS at 05:54

## 2025-08-22 RX ADMIN — METOPROLOL TARTRATE 25 MG: 25 TABLET, FILM COATED ORAL at 08:32

## 2025-08-22 RX ADMIN — MYCOPHENOLATE MOFETIL 250 MG: 250 CAPSULE ORAL at 08:27

## 2025-08-22 RX ADMIN — AMIODARONE HYDROCHLORIDE 200 MG: 200 TABLET ORAL at 08:27

## 2025-08-22 RX ADMIN — FUROSEMIDE 40 MG: 10 INJECTION, SOLUTION INTRAMUSCULAR; INTRAVENOUS at 18:06

## 2025-08-22 RX ADMIN — FUROSEMIDE 40 MG: 10 INJECTION, SOLUTION INTRAMUSCULAR; INTRAVENOUS at 08:26

## 2025-08-22 RX ADMIN — PREDNISONE 5 MG: 5 TABLET ORAL at 08:27

## 2025-08-22 RX ADMIN — ATORVASTATIN CALCIUM 20 MG: 20 TABLET, FILM COATED ORAL at 08:27

## 2025-08-22 RX ADMIN — CINACALCET HYDROCHLORIDE 30 MG: 30 TABLET, FILM COATED ORAL at 08:27

## 2025-08-22 RX ADMIN — GABAPENTIN 100 MG: 100 CAPSULE ORAL at 08:26

## 2025-08-22 RX ADMIN — TACROLIMUS 1 MG: 1 CAPSULE ORAL at 08:27

## 2025-08-22 RX ADMIN — Medication 400 MG: at 08:28

## 2025-08-22 RX ADMIN — ASPIRIN 81 MG: 81 TABLET, DELAYED RELEASE ORAL at 08:27

## 2025-08-22 RX ADMIN — ENOXAPARIN SODIUM 80 MG: 100 INJECTION SUBCUTANEOUS at 08:26

## 2025-08-22 RX ADMIN — SULFAMETHOXAZOLE AND TRIMETHOPRIM 1 TABLET: 400; 80 TABLET ORAL at 08:27

## 2025-08-22 RX ADMIN — SODIUM CHLORIDE, PRESERVATIVE FREE 10 ML: 5 INJECTION INTRAVENOUS at 08:28

## 2025-08-22 RX ADMIN — ESCITALOPRAM OXALATE 10 MG: 10 TABLET ORAL at 08:27

## 2025-08-22 RX ADMIN — PANTOPRAZOLE SODIUM 40 MG: 40 TABLET, DELAYED RELEASE ORAL at 05:54

## 2025-08-22 ASSESSMENT — PAIN DESCRIPTION - LOCATION: LOCATION: GENERALIZED

## 2025-08-22 ASSESSMENT — PAIN SCALES - GENERAL
PAINLEVEL_OUTOF10: 0

## 2025-08-24 LAB
BACTERIA BLD CULT ORG #2: NORMAL
BACTERIA BLD CULT: NORMAL

## (undated) DEVICE — ELECTRODE PT RET AD L9FT HI MOIST COND ADH HYDRGEL CORDED

## (undated) DEVICE — DISPOSABLE TUBING SET AND EXTENDER FILTER TUBING

## (undated) DEVICE — 6.0MM ACORN

## (undated) DEVICE — SPONGE DRN W4XL4IN RAYON/POLYESTER 6 PLY NONWOVEN PRECUT

## (undated) DEVICE — INTENDED FOR TISSUE SEPARATION, AND OTHER PROCEDURES THAT REQUIRE A SHARP SURGICAL BLADE TO PUNCTURE OR CUT.: Brand: BARD-PARKER ® CARBON RIB-BACK BLADES

## (undated) DEVICE — SYRINGE IRRIG 60ML SFT PLIABLE BLB EZ TO GRP 1 HND USE W/

## (undated) DEVICE — LABEL MED MINI W/ MARKER

## (undated) DEVICE — ADAPTER FLSH PMP FLD MGMT GI IRRIG OFP 2 DISPOSABLE

## (undated) DEVICE — GLOVE ORANGE PI 8   MSG9080

## (undated) DEVICE — 3M™ TEGADERM™ TRANSPARENT FILM DRESSING FRAME STYLE, 1626W, 4 IN X 4-3/4 IN (10 CM X 12 CM), 50/CT 4CT/CASE: Brand: 3M™ TEGADERM™

## (undated) DEVICE — COUNTER NDL 40 COUNT HLD 70 FOAM BLK ADH W/ MAG

## (undated) DEVICE — FRAMELESS GUIDE

## (undated) DEVICE — SPONGE GZ W4XL4IN RAYON POLY FILL CVR W/ NONWOVEN FAB

## (undated) DEVICE — 3M™ STERI-DRAPE™ INSTRUMENT POUCH 1018: Brand: STERI-DRAPE™

## (undated) DEVICE — CORD,CAUTERY,BIPOLAR,STERILE: Brand: MEDLINE

## (undated) DEVICE — HYPODERMIC SAFETY NEEDLE: Brand: MAGELLAN

## (undated) DEVICE — PENCIL SMOKE EVAC PUSH BUTTON COATED

## (undated) DEVICE — TRAY PREP DRY W/ PREM GLV 2 APPL 6 SPNG 2 UNDPD 1 OVERWRAP

## (undated) DEVICE — SYRINGE MED 10ML LUERLOCK TIP W/O SFTY DISP

## (undated) DEVICE — ENDO CARRY-ON PROCEDURE KIT: Brand: ENDO CARRY-ON PROCEDURE KIT

## (undated) DEVICE — WAX SURG 2.5GM HEMSTAT BNE BEESWAX PARAFFIN ISO PALMITATE

## (undated) DEVICE — TUBING, SUCTION, 1/4" X 10', STRAIGHT: Brand: MEDLINE

## (undated) DEVICE — MARKER SURG SKIN GENTIAN VLT REG TIP W/ 6IN RUL

## (undated) DEVICE — CRANIALMASK TRACKER: Brand: CRANIALMASK TRACKER

## (undated) DEVICE — DRAPE EQUIP TRNSPRT CONTAINMENT FOR BK TAB

## (undated) DEVICE — Device: Brand: ENDO SMARTCAP

## (undated) DEVICE — SUTURE VCRL 2-0 L27IN ABSRB UD CP-2 L26MM 1/2 CIR REV CUT J869H

## (undated) DEVICE — CODMAN® SURGICAL PATTIES 1/2" X 3" (1.27CM X 7.62CM): Brand: CODMAN®

## (undated) DEVICE — GAUZE,SPONGE,4"X4",16PLY,XRAY,STRL,LF: Brand: MEDLINE

## (undated) DEVICE — STRAIGHT TIP, UNIVERSAL

## (undated) DEVICE — CARBIDE MATCH HEAD

## (undated) DEVICE — 3M™ IOBAN™ 2 ANTIMICROBIAL INCISE DRAPE 6650EZ: Brand: IOBAN™ 2

## (undated) DEVICE — GOWN,AURORA,NONREINFORCED,LARGE: Brand: MEDLINE

## (undated) DEVICE — GLOVE ORANGE PI 7 1/2   MSG9075

## (undated) DEVICE — SHEET,DRAPE,53X77,STERILE: Brand: MEDLINE

## (undated) DEVICE — 4-PORT MANIFOLD: Brand: NEPTUNE 2

## (undated) DEVICE — PACK,LAPAROTOMY,NO GOWNS: Brand: MEDLINE

## (undated) DEVICE — SONY PRINTER PAPER

## (undated) DEVICE — TUBE SET 96 MM 64 MM H2O PERISTALTIC STD AUX CHANNEL

## (undated) DEVICE — SPONGE GZ W4XL4IN COT 12 PLY TYP VII WVN C FLD DSGN

## (undated) DEVICE — PAD N ADH W3XL4IN POLY COT SFT PERF FLM EASILY CUT ABSRB

## (undated) DEVICE — DRAPE CAM W7INXL96MM CVR SURG EQUIP LSR ARM UNIV VID CAM

## (undated) DEVICE — CONMED SCOPE SAVER BITE BLOCK, 20X27 MM: Brand: SCOPE SAVER

## (undated) DEVICE — BRUSH ENDO CLN L90.5IN SHTH DIA1.7MM BRIST DIA5-7MM 2-6MM

## (undated) DEVICE — ALCOHOL RUBBING ISO 16OZ 70%

## (undated) DEVICE — SUTURE VCRL + SZ 3-0 L18IN ABSRB UD PS-2 3/8 CIR REV CUT VCP497H